# Patient Record
Sex: MALE | Race: WHITE | Employment: OTHER | URBAN - NONMETROPOLITAN AREA
[De-identification: names, ages, dates, MRNs, and addresses within clinical notes are randomized per-mention and may not be internally consistent; named-entity substitution may affect disease eponyms.]

---

## 2017-01-05 ENCOUNTER — HOSPITAL ENCOUNTER (OUTPATIENT)
Dept: WOUND CARE | Age: 82
Discharge: HOME | End: 2017-01-05
Payer: MEDICARE

## 2017-01-05 VITALS
WEIGHT: 214 LBS | DIASTOLIC BLOOD PRESSURE: 83 MMHG | TEMPERATURE: 97.5 F | SYSTOLIC BLOOD PRESSURE: 136 MMHG | HEIGHT: 72 IN | HEART RATE: 98 BPM | BODY MASS INDEX: 28.99 KG/M2 | RESPIRATION RATE: 18 BRPM

## 2017-01-05 DIAGNOSIS — L97.222 NON-PRESSURE CHRONIC ULCER OF LEFT CALF WITH FAT LAYER EXPOSED (HCC): ICD-10-CM

## 2017-01-05 DIAGNOSIS — I87.312 CHRONIC VENOUS HYPERTENSION (IDIOPATHIC) WITH ULCER OF LEFT LOWER EXTREMITY (HCC): ICD-10-CM

## 2017-01-05 DIAGNOSIS — L97.929 CHRONIC VENOUS HYPERTENSION (IDIOPATHIC) WITH ULCER OF LEFT LOWER EXTREMITY (HCC): ICD-10-CM

## 2017-01-05 PROCEDURE — 97597 DBRDMT OPN WND 1ST 20 CM/<: CPT

## 2017-01-05 PROCEDURE — 97597 DBRDMT OPN WND 1ST 20 CM/<: CPT | Performed by: SURGERY

## 2017-01-05 ASSESSMENT — PAIN SCALES - GENERAL: PAINLEVEL_OUTOF10: 0

## 2017-01-12 ENCOUNTER — HOSPITAL ENCOUNTER (OUTPATIENT)
Dept: WOUND CARE | Age: 82
Discharge: HOME | End: 2017-01-12
Payer: MEDICARE

## 2017-01-12 VITALS
RESPIRATION RATE: 18 BRPM | BODY MASS INDEX: 28.99 KG/M2 | DIASTOLIC BLOOD PRESSURE: 85 MMHG | SYSTOLIC BLOOD PRESSURE: 142 MMHG | HEART RATE: 105 BPM | HEIGHT: 72 IN | TEMPERATURE: 98 F | WEIGHT: 214 LBS

## 2017-01-12 DIAGNOSIS — L97.929 CHRONIC VENOUS HYPERTENSION (IDIOPATHIC) WITH ULCER OF LEFT LOWER EXTREMITY (HCC): ICD-10-CM

## 2017-01-12 DIAGNOSIS — I87.312 CHRONIC VENOUS HYPERTENSION (IDIOPATHIC) WITH ULCER OF LEFT LOWER EXTREMITY (HCC): ICD-10-CM

## 2017-01-12 DIAGNOSIS — L97.222 NON-PRESSURE CHRONIC ULCER OF LEFT CALF WITH FAT LAYER EXPOSED (HCC): ICD-10-CM

## 2017-01-12 PROCEDURE — 97597 DBRDMT OPN WND 1ST 20 CM/<: CPT | Performed by: SURGERY

## 2017-01-12 PROCEDURE — 97597 DBRDMT OPN WND 1ST 20 CM/<: CPT

## 2017-01-12 ASSESSMENT — PAIN SCALES - GENERAL: PAINLEVEL_OUTOF10: 0

## 2017-01-12 ASSESSMENT — PAIN DESCRIPTION - PROGRESSION: CLINICAL_PROGRESSION: NOT CHANGED

## 2017-01-12 ASSESSMENT — PAIN DESCRIPTION - ONSET: ONSET: ON-GOING

## 2017-01-12 ASSESSMENT — PAIN DESCRIPTION - ORIENTATION: ORIENTATION: LEFT

## 2017-01-12 ASSESSMENT — PAIN DESCRIPTION - FREQUENCY: FREQUENCY: INTERMITTENT

## 2017-01-12 ASSESSMENT — PAIN DESCRIPTION - LOCATION: LOCATION: LEG

## 2017-01-12 ASSESSMENT — PAIN DESCRIPTION - DESCRIPTORS: DESCRIPTORS: SORE;TENDER

## 2017-01-12 ASSESSMENT — PAIN DESCRIPTION - PAIN TYPE: TYPE: ACUTE PAIN

## 2017-01-18 ENCOUNTER — HOSPITAL ENCOUNTER (OUTPATIENT)
Dept: WOUND CARE | Age: 82
Discharge: HOME | End: 2017-01-18
Payer: MEDICARE

## 2017-01-18 VITALS
RESPIRATION RATE: 16 BRPM | TEMPERATURE: 97 F | DIASTOLIC BLOOD PRESSURE: 86 MMHG | BODY MASS INDEX: 28.99 KG/M2 | HEART RATE: 73 BPM | HEIGHT: 72 IN | WEIGHT: 214 LBS | SYSTOLIC BLOOD PRESSURE: 139 MMHG

## 2017-01-18 DIAGNOSIS — L97.929 CHRONIC VENOUS HYPERTENSION (IDIOPATHIC) WITH ULCER OF LEFT LOWER EXTREMITY (HCC): ICD-10-CM

## 2017-01-18 DIAGNOSIS — L97.222 NON-PRESSURE CHRONIC ULCER OF LEFT CALF WITH FAT LAYER EXPOSED (HCC): ICD-10-CM

## 2017-01-18 DIAGNOSIS — I87.312 CHRONIC VENOUS HYPERTENSION (IDIOPATHIC) WITH ULCER OF LEFT LOWER EXTREMITY (HCC): ICD-10-CM

## 2017-01-18 PROCEDURE — 97597 DBRDMT OPN WND 1ST 20 CM/<: CPT

## 2017-01-18 PROCEDURE — 97597 DBRDMT OPN WND 1ST 20 CM/<: CPT | Performed by: SURGERY

## 2017-01-25 ENCOUNTER — HOSPITAL ENCOUNTER (OUTPATIENT)
Dept: WOUND CARE | Age: 82
Discharge: HOME | End: 2017-01-25
Payer: MEDICARE

## 2017-01-25 VITALS
RESPIRATION RATE: 16 BRPM | WEIGHT: 214 LBS | HEIGHT: 72 IN | SYSTOLIC BLOOD PRESSURE: 152 MMHG | HEART RATE: 99 BPM | TEMPERATURE: 97.4 F | BODY MASS INDEX: 28.99 KG/M2 | DIASTOLIC BLOOD PRESSURE: 87 MMHG

## 2017-01-25 DIAGNOSIS — I87.312 CHRONIC VENOUS HYPERTENSION (IDIOPATHIC) WITH ULCER OF LEFT LOWER EXTREMITY (HCC): ICD-10-CM

## 2017-01-25 DIAGNOSIS — L97.222 NON-PRESSURE CHRONIC ULCER OF LEFT CALF WITH FAT LAYER EXPOSED (HCC): ICD-10-CM

## 2017-01-25 DIAGNOSIS — L97.929 CHRONIC VENOUS HYPERTENSION (IDIOPATHIC) WITH ULCER OF LEFT LOWER EXTREMITY (HCC): ICD-10-CM

## 2017-01-25 PROCEDURE — 97597 DBRDMT OPN WND 1ST 20 CM/<: CPT | Performed by: SURGERY

## 2017-01-25 PROCEDURE — 97597 DBRDMT OPN WND 1ST 20 CM/<: CPT

## 2017-01-25 ASSESSMENT — PAIN SCALES - GENERAL: PAINLEVEL_OUTOF10: 0

## 2017-02-01 ENCOUNTER — HOSPITAL ENCOUNTER (OUTPATIENT)
Dept: WOUND CARE | Age: 82
Discharge: HOME | End: 2017-02-01
Payer: MEDICARE

## 2017-02-01 VITALS
TEMPERATURE: 97.2 F | BODY MASS INDEX: 28.99 KG/M2 | DIASTOLIC BLOOD PRESSURE: 72 MMHG | WEIGHT: 214 LBS | SYSTOLIC BLOOD PRESSURE: 119 MMHG | HEIGHT: 72 IN | HEART RATE: 84 BPM | RESPIRATION RATE: 18 BRPM

## 2017-02-01 DIAGNOSIS — L97.929 CHRONIC VENOUS HYPERTENSION (IDIOPATHIC) WITH ULCER OF LEFT LOWER EXTREMITY (HCC): ICD-10-CM

## 2017-02-01 DIAGNOSIS — L97.222 NON-PRESSURE CHRONIC ULCER OF LEFT CALF WITH FAT LAYER EXPOSED (HCC): ICD-10-CM

## 2017-02-01 DIAGNOSIS — I87.312 CHRONIC VENOUS HYPERTENSION (IDIOPATHIC) WITH ULCER OF LEFT LOWER EXTREMITY (HCC): ICD-10-CM

## 2017-02-01 PROCEDURE — 97597 DBRDMT OPN WND 1ST 20 CM/<: CPT

## 2017-02-01 PROCEDURE — 97597 DBRDMT OPN WND 1ST 20 CM/<: CPT | Performed by: SURGERY

## 2017-02-01 ASSESSMENT — PAIN SCALES - GENERAL: PAINLEVEL_OUTOF10: 0

## 2017-02-06 ENCOUNTER — HOSPITAL ENCOUNTER (OUTPATIENT)
Dept: WOUND CARE | Age: 82
Discharge: HOME | End: 2017-02-06
Payer: MEDICARE

## 2017-02-06 VITALS
BODY MASS INDEX: 28.99 KG/M2 | SYSTOLIC BLOOD PRESSURE: 114 MMHG | RESPIRATION RATE: 18 BRPM | TEMPERATURE: 97.8 F | DIASTOLIC BLOOD PRESSURE: 72 MMHG | HEART RATE: 78 BPM | WEIGHT: 214 LBS | HEIGHT: 72 IN

## 2017-02-06 DIAGNOSIS — L97.222 NON-PRESSURE CHRONIC ULCER OF LEFT CALF WITH FAT LAYER EXPOSED (HCC): ICD-10-CM

## 2017-02-06 DIAGNOSIS — I87.312 CHRONIC VENOUS HYPERTENSION (IDIOPATHIC) WITH ULCER OF LEFT LOWER EXTREMITY (HCC): ICD-10-CM

## 2017-02-06 DIAGNOSIS — L97.929 CHRONIC VENOUS HYPERTENSION (IDIOPATHIC) WITH ULCER OF LEFT LOWER EXTREMITY (HCC): ICD-10-CM

## 2017-02-06 PROCEDURE — 97597 DBRDMT OPN WND 1ST 20 CM/<: CPT | Performed by: SURGERY

## 2017-02-06 PROCEDURE — 97597 DBRDMT OPN WND 1ST 20 CM/<: CPT

## 2017-02-06 ASSESSMENT — PAIN SCALES - GENERAL: PAINLEVEL_OUTOF10: 0

## 2017-02-13 ENCOUNTER — HOSPITAL ENCOUNTER (OUTPATIENT)
Dept: WOUND CARE | Age: 82
Discharge: HOME | End: 2017-02-13
Payer: MEDICARE

## 2017-02-13 VITALS
TEMPERATURE: 98.2 F | WEIGHT: 214 LBS | BODY MASS INDEX: 28.99 KG/M2 | SYSTOLIC BLOOD PRESSURE: 130 MMHG | HEART RATE: 73 BPM | DIASTOLIC BLOOD PRESSURE: 78 MMHG | HEIGHT: 72 IN | RESPIRATION RATE: 18 BRPM

## 2017-02-13 DIAGNOSIS — I87.312 CHRONIC VENOUS HYPERTENSION (IDIOPATHIC) WITH ULCER OF LEFT LOWER EXTREMITY (HCC): ICD-10-CM

## 2017-02-13 DIAGNOSIS — L97.222 NON-PRESSURE CHRONIC ULCER OF LEFT CALF WITH FAT LAYER EXPOSED (HCC): ICD-10-CM

## 2017-02-13 DIAGNOSIS — L97.929 CHRONIC VENOUS HYPERTENSION (IDIOPATHIC) WITH ULCER OF LEFT LOWER EXTREMITY (HCC): ICD-10-CM

## 2017-02-13 PROCEDURE — 97597 DBRDMT OPN WND 1ST 20 CM/<: CPT | Performed by: SURGERY

## 2017-02-13 PROCEDURE — 97597 DBRDMT OPN WND 1ST 20 CM/<: CPT

## 2017-02-13 ASSESSMENT — PAIN DESCRIPTION - FREQUENCY: FREQUENCY: INTERMITTENT

## 2017-02-13 ASSESSMENT — PAIN DESCRIPTION - PAIN TYPE: TYPE: ACUTE PAIN

## 2017-02-13 ASSESSMENT — PAIN DESCRIPTION - DESCRIPTORS: DESCRIPTORS: SORE;TENDER

## 2017-02-13 ASSESSMENT — PAIN DESCRIPTION - ORIENTATION: ORIENTATION: LEFT

## 2017-02-13 ASSESSMENT — PAIN DESCRIPTION - LOCATION: LOCATION: LEG

## 2017-02-13 ASSESSMENT — PAIN SCALES - GENERAL: PAINLEVEL_OUTOF10: 0

## 2017-02-13 ASSESSMENT — PAIN DESCRIPTION - PROGRESSION: CLINICAL_PROGRESSION: NOT CHANGED

## 2017-02-13 ASSESSMENT — PAIN DESCRIPTION - ONSET: ONSET: ON-GOING

## 2017-02-21 ENCOUNTER — HOSPITAL ENCOUNTER (OUTPATIENT)
Dept: WOUND CARE | Age: 82
Discharge: HOME | End: 2017-02-21
Payer: MEDICARE

## 2017-02-21 VITALS
SYSTOLIC BLOOD PRESSURE: 129 MMHG | WEIGHT: 212 LBS | HEART RATE: 82 BPM | HEIGHT: 72 IN | BODY MASS INDEX: 28.71 KG/M2 | DIASTOLIC BLOOD PRESSURE: 75 MMHG | RESPIRATION RATE: 24 BRPM | TEMPERATURE: 97.8 F

## 2017-02-21 DIAGNOSIS — L97.929 CHRONIC VENOUS HYPERTENSION (IDIOPATHIC) WITH ULCER OF LEFT LOWER EXTREMITY (HCC): ICD-10-CM

## 2017-02-21 DIAGNOSIS — L97.222 NON-PRESSURE CHRONIC ULCER OF LEFT CALF WITH FAT LAYER EXPOSED (HCC): ICD-10-CM

## 2017-02-21 DIAGNOSIS — I87.312 CHRONIC VENOUS HYPERTENSION (IDIOPATHIC) WITH ULCER OF LEFT LOWER EXTREMITY (HCC): ICD-10-CM

## 2017-02-21 PROCEDURE — 97597 DBRDMT OPN WND 1ST 20 CM/<: CPT

## 2017-02-21 PROCEDURE — 97597 DBRDMT OPN WND 1ST 20 CM/<: CPT | Performed by: SURGERY

## 2017-02-21 ASSESSMENT — PAIN DESCRIPTION - LOCATION: LOCATION: LEG

## 2017-02-21 ASSESSMENT — PAIN DESCRIPTION - ORIENTATION: ORIENTATION: LEFT

## 2017-02-21 ASSESSMENT — PAIN DESCRIPTION - DESCRIPTORS: DESCRIPTORS: SORE

## 2017-02-21 ASSESSMENT — PAIN DESCRIPTION - PAIN TYPE: TYPE: ACUTE PAIN

## 2017-02-21 ASSESSMENT — PAIN SCALES - GENERAL: PAINLEVEL_OUTOF10: 2

## 2017-02-21 ASSESSMENT — PAIN DESCRIPTION - FREQUENCY: FREQUENCY: INTERMITTENT

## 2017-02-28 ENCOUNTER — HOSPITAL ENCOUNTER (OUTPATIENT)
Dept: WOUND CARE | Age: 82
Discharge: HOME | End: 2017-02-28
Payer: MEDICARE

## 2017-02-28 VITALS
WEIGHT: 212 LBS | BODY MASS INDEX: 28.71 KG/M2 | HEIGHT: 72 IN | SYSTOLIC BLOOD PRESSURE: 156 MMHG | HEART RATE: 74 BPM | DIASTOLIC BLOOD PRESSURE: 90 MMHG | TEMPERATURE: 98 F | RESPIRATION RATE: 16 BRPM

## 2017-02-28 DIAGNOSIS — I87.312 CHRONIC VENOUS HYPERTENSION (IDIOPATHIC) WITH ULCER OF LEFT LOWER EXTREMITY (HCC): ICD-10-CM

## 2017-02-28 DIAGNOSIS — L97.929 CHRONIC VENOUS HYPERTENSION (IDIOPATHIC) WITH ULCER OF LEFT LOWER EXTREMITY (HCC): ICD-10-CM

## 2017-02-28 DIAGNOSIS — L97.222 NON-PRESSURE CHRONIC ULCER OF LEFT CALF WITH FAT LAYER EXPOSED (HCC): ICD-10-CM

## 2017-02-28 PROCEDURE — 15271 SKIN SUB GRAFT TRNK/ARM/LEG: CPT

## 2017-02-28 PROCEDURE — 15271 SKIN SUB GRAFT TRNK/ARM/LEG: CPT | Performed by: SURGERY

## 2017-02-28 PROCEDURE — C5271 LOW COST SKIN SUBSTITUTE APP: HCPCS

## 2017-02-28 ASSESSMENT — PAIN SCALES - GENERAL: PAINLEVEL_OUTOF10: 0

## 2017-03-07 ENCOUNTER — HOSPITAL ENCOUNTER (OUTPATIENT)
Dept: WOUND CARE | Age: 82
Discharge: HOME | End: 2017-03-07
Payer: MEDICARE

## 2017-03-07 VITALS
TEMPERATURE: 97.5 F | DIASTOLIC BLOOD PRESSURE: 79 MMHG | SYSTOLIC BLOOD PRESSURE: 134 MMHG | RESPIRATION RATE: 18 BRPM | HEART RATE: 75 BPM | HEIGHT: 72 IN | WEIGHT: 212 LBS | BODY MASS INDEX: 28.71 KG/M2

## 2017-03-07 DIAGNOSIS — L97.222 NON-PRESSURE CHRONIC ULCER OF LEFT CALF WITH FAT LAYER EXPOSED (HCC): ICD-10-CM

## 2017-03-07 DIAGNOSIS — I87.312 CHRONIC VENOUS HYPERTENSION (IDIOPATHIC) WITH ULCER OF LEFT LOWER EXTREMITY (HCC): ICD-10-CM

## 2017-03-07 DIAGNOSIS — L97.929 CHRONIC VENOUS HYPERTENSION (IDIOPATHIC) WITH ULCER OF LEFT LOWER EXTREMITY (HCC): ICD-10-CM

## 2017-03-07 PROCEDURE — 99213 OFFICE O/P EST LOW 20 MIN: CPT | Performed by: SURGERY

## 2017-03-07 PROCEDURE — 99212 OFFICE O/P EST SF 10 MIN: CPT

## 2017-03-07 ASSESSMENT — PAIN SCALES - GENERAL: PAINLEVEL_OUTOF10: 0

## 2017-03-14 ENCOUNTER — HOSPITAL ENCOUNTER (OUTPATIENT)
Dept: WOUND CARE | Age: 82
Discharge: HOME | End: 2017-03-14
Payer: MEDICARE

## 2017-03-14 VITALS
DIASTOLIC BLOOD PRESSURE: 77 MMHG | TEMPERATURE: 97.7 F | BODY MASS INDEX: 28.71 KG/M2 | HEIGHT: 72 IN | WEIGHT: 212 LBS | HEART RATE: 82 BPM | RESPIRATION RATE: 18 BRPM | SYSTOLIC BLOOD PRESSURE: 135 MMHG

## 2017-03-14 DIAGNOSIS — I87.312 CHRONIC VENOUS HYPERTENSION (IDIOPATHIC) WITH ULCER OF LEFT LOWER EXTREMITY (HCC): ICD-10-CM

## 2017-03-14 DIAGNOSIS — L97.929 CHRONIC VENOUS HYPERTENSION (IDIOPATHIC) WITH ULCER OF LEFT LOWER EXTREMITY (HCC): ICD-10-CM

## 2017-03-14 DIAGNOSIS — L97.222 NON-PRESSURE CHRONIC ULCER OF LEFT CALF WITH FAT LAYER EXPOSED (HCC): ICD-10-CM

## 2017-03-14 PROCEDURE — 99212 OFFICE O/P EST SF 10 MIN: CPT

## 2017-03-14 PROCEDURE — 99213 OFFICE O/P EST LOW 20 MIN: CPT | Performed by: SURGERY

## 2017-03-14 ASSESSMENT — PAIN SCALES - GENERAL: PAINLEVEL_OUTOF10: 0

## 2017-03-21 ENCOUNTER — HOSPITAL ENCOUNTER (OUTPATIENT)
Dept: WOUND CARE | Age: 82
Discharge: HOME | End: 2017-03-21
Payer: MEDICARE

## 2017-03-21 VITALS
HEIGHT: 72 IN | TEMPERATURE: 97.5 F | RESPIRATION RATE: 16 BRPM | SYSTOLIC BLOOD PRESSURE: 131 MMHG | BODY MASS INDEX: 28.71 KG/M2 | DIASTOLIC BLOOD PRESSURE: 88 MMHG | HEART RATE: 98 BPM | WEIGHT: 212 LBS

## 2017-03-21 DIAGNOSIS — L97.222 NON-PRESSURE CHRONIC ULCER OF LEFT CALF WITH FAT LAYER EXPOSED (HCC): ICD-10-CM

## 2017-03-21 DIAGNOSIS — L97.929 CHRONIC VENOUS HYPERTENSION (IDIOPATHIC) WITH ULCER OF LEFT LOWER EXTREMITY (HCC): ICD-10-CM

## 2017-03-21 DIAGNOSIS — I87.312 CHRONIC VENOUS HYPERTENSION (IDIOPATHIC) WITH ULCER OF LEFT LOWER EXTREMITY (HCC): ICD-10-CM

## 2017-03-21 PROCEDURE — 99212 OFFICE O/P EST SF 10 MIN: CPT

## 2017-03-21 PROCEDURE — 99213 OFFICE O/P EST LOW 20 MIN: CPT | Performed by: SURGERY

## 2017-03-21 ASSESSMENT — PAIN SCALES - GENERAL: PAINLEVEL_OUTOF10: 0

## 2017-03-28 ENCOUNTER — HOSPITAL ENCOUNTER (OUTPATIENT)
Dept: WOUND CARE | Age: 82
Discharge: HOME | End: 2017-03-28
Payer: MEDICARE

## 2017-03-28 VITALS
SYSTOLIC BLOOD PRESSURE: 143 MMHG | RESPIRATION RATE: 16 BRPM | WEIGHT: 212 LBS | BODY MASS INDEX: 28.71 KG/M2 | DIASTOLIC BLOOD PRESSURE: 87 MMHG | HEART RATE: 93 BPM | HEIGHT: 72 IN | TEMPERATURE: 97 F

## 2017-03-28 DIAGNOSIS — L97.929 CHRONIC VENOUS HYPERTENSION (IDIOPATHIC) WITH ULCER OF LEFT LOWER EXTREMITY (HCC): ICD-10-CM

## 2017-03-28 DIAGNOSIS — I87.312 CHRONIC VENOUS HYPERTENSION (IDIOPATHIC) WITH ULCER OF LEFT LOWER EXTREMITY (HCC): ICD-10-CM

## 2017-03-28 DIAGNOSIS — L97.222 NON-PRESSURE CHRONIC ULCER OF LEFT CALF WITH FAT LAYER EXPOSED (HCC): ICD-10-CM

## 2017-03-28 PROCEDURE — 99212 OFFICE O/P EST SF 10 MIN: CPT

## 2017-03-28 PROCEDURE — 99213 OFFICE O/P EST LOW 20 MIN: CPT | Performed by: SURGERY

## 2017-03-28 ASSESSMENT — PAIN SCALES - GENERAL: PAINLEVEL_OUTOF10: 0

## 2017-07-25 ENCOUNTER — TRANSCRIBE ORDERS (OUTPATIENT)
Dept: ADMINISTRATIVE | Facility: HOSPITAL | Age: 82
End: 2017-07-25

## 2017-07-25 ENCOUNTER — TELEPHONE (OUTPATIENT)
Dept: VASCULAR SURGERY | Facility: CLINIC | Age: 82
End: 2017-07-25

## 2017-07-25 ENCOUNTER — HOSPITAL ENCOUNTER (OUTPATIENT)
Dept: ULTRASOUND IMAGING | Facility: HOSPITAL | Age: 82
Discharge: HOME | End: 2017-07-25
Admitting: NURSE PRACTITIONER

## 2017-07-25 ENCOUNTER — TRANSCRIBE ORDERS (OUTPATIENT)
Dept: LAB | Facility: HOSPITAL | Age: 82
End: 2017-07-25

## 2017-07-25 DIAGNOSIS — I87.2 CHRONIC VENOUS INSUFFICIENCY: ICD-10-CM

## 2017-07-25 DIAGNOSIS — I82.401 ACUTE EMBOLISM AND THROMBOSIS OF DEEP VEIN OF RIGHT LOWER EXTREMITY (HCC): ICD-10-CM

## 2017-07-25 DIAGNOSIS — I82.401 ACUTE EMBOLISM AND THROMBOSIS OF DEEP VEIN OF RIGHT LOWER EXTREMITY (HCC): Primary | ICD-10-CM

## 2017-07-25 DIAGNOSIS — I87.2 UNSPECIFIED VENOUS (PERIPHERAL) INSUFFICIENCY: Primary | ICD-10-CM

## 2017-07-25 PROCEDURE — 93971 EXTREMITY STUDY: CPT

## 2017-07-25 NOTE — TELEPHONE ENCOUNTER
SPOKE WITH PATIENT EMERGENCY CONTACT DUE TO BEING UNABLE TO REACH PATIENT.  ADVISED THAT PATIENT HAS A TEST AT THE [facility] AND AN APPOINTMENT FOLLOWING.  PATIENT EMERGENCY CONTACT EXPRESSED UNDERSTANDING.

## 2017-07-26 ENCOUNTER — OFFICE VISIT (OUTPATIENT)
Dept: VASCULAR SURGERY | Facility: CLINIC | Age: 82
End: 2017-07-26

## 2017-07-26 ENCOUNTER — HOSPITAL ENCOUNTER (OUTPATIENT)
Dept: ULTRASOUND IMAGING | Facility: HOSPITAL | Age: 82
Discharge: HOME | End: 2017-07-26
Admitting: NURSE PRACTITIONER

## 2017-07-26 VITALS
HEART RATE: 82 BPM | WEIGHT: 215 LBS | SYSTOLIC BLOOD PRESSURE: 114 MMHG | BODY MASS INDEX: 29.12 KG/M2 | DIASTOLIC BLOOD PRESSURE: 78 MMHG | HEIGHT: 72 IN

## 2017-07-26 DIAGNOSIS — I87.2 STASIS DERMATITIS OF BOTH LEGS: ICD-10-CM

## 2017-07-26 DIAGNOSIS — M79.89 LEG SWELLING: ICD-10-CM

## 2017-07-26 DIAGNOSIS — I87.2 CHRONIC VENOUS INSUFFICIENCY: ICD-10-CM

## 2017-07-26 DIAGNOSIS — I73.9 PAD (PERIPHERAL ARTERY DISEASE) (HCC): Primary | ICD-10-CM

## 2017-07-26 DIAGNOSIS — I10 ESSENTIAL HYPERTENSION: ICD-10-CM

## 2017-07-26 PROBLEM — I83.893 VARICOSE VEINS OF BOTH LEGS WITH EDEMA: Status: ACTIVE | Noted: 2017-07-26

## 2017-07-26 PROCEDURE — 99203 OFFICE O/P NEW LOW 30 MIN: CPT | Performed by: NURSE PRACTITIONER

## 2017-07-26 PROCEDURE — 93924 LWR XTR VASC STDY BILAT: CPT | Performed by: SURGERY

## 2017-07-26 PROCEDURE — 93923 UPR/LXTR ART STDY 3+ LVLS: CPT

## 2017-07-26 RX ORDER — HYDROCORTISONE ACETATE 0.5 %
1500 CREAM (GRAM) TOPICAL 2 TIMES DAILY
COMMUNITY
End: 2018-07-26

## 2017-07-26 RX ORDER — PREDNISONE 1 MG/1
TABLET ORAL
COMMUNITY
Start: 2017-05-23 | End: 2017-07-26

## 2017-07-26 RX ORDER — CHLORAL HYDRATE 500 MG
1000 CAPSULE ORAL NIGHTLY
COMMUNITY

## 2017-07-26 RX ORDER — ASPIRIN 81 MG/1
81 TABLET ORAL DAILY
Start: 2017-07-26 | End: 2017-09-14

## 2017-07-26 RX ORDER — LISINOPRIL AND HYDROCHLOROTHIAZIDE 20; 12.5 MG/1; MG/1
TABLET ORAL
Refills: 3 | COMMUNITY
Start: 2017-06-12 | End: 2018-07-26

## 2017-07-26 RX ORDER — GATIFLOXACIN 5 MG/ML
SOLUTION/ DROPS OPHTHALMIC
Refills: 1 | COMMUNITY
Start: 2017-06-03 | End: 2017-07-26

## 2017-07-26 RX ORDER — LANOLIN ALCOHOL/MO/W.PET/CERES
1000 CREAM (GRAM) TOPICAL DAILY
COMMUNITY

## 2017-07-26 NOTE — PROGRESS NOTES
"07/26/2017      Gene Hernandez DPM  [id number]  [place] [address]    Sangita Viramontes  1/5/1933    Chief Complaint   Patient presents with   • Lower Extremity Issue     below knee area fluid / light pain when walking        Dear Gene Hernandez DPM:      HPI  I had the pleasure of seeing your patient Sangita Viramontes in the office today.  Thank you kindly for this consultation.  As you recall, Sangita Viramontes is a 84 y.o.  male who you have recently seen.  He states he was seen for \"fluid buildup in his legs\".  He seems to be referring to varicose veins to his right lower extremity.  He has evidence of stasis dermatitis and swelling to bilateral lower extremities.  He denies any cramping or aching of his legs.  He denies any claudicating symptoms to his lower extremities.  He was sent to our office for abnormal pulses and sent for arterial testing.  He did have noninvasive testing performed today, which I did review in office.    Past Medical History:   Diagnosis Date   • Hypertension        Past Surgical History:   Procedure Laterality Date   • CATARACT EXTRACTION Bilateral    • CHOLECYSTECTOMY     • LEG SURGERY     • TOTAL KNEE ARTHROPLASTY Left    • TOTAL KNEE ARTHROPLASTY Right        History reviewed. No pertinent family history.    Social History     Social History   • Marital status:      Spouse name: N/A   • Number of children: N/A   • Years of education: N/A     Occupational History   • Not on file.     Social History Main Topics   • Smoking status: Former Smoker   • Smokeless tobacco: Former User     Types: Chew   • Alcohol use No   • Drug use: No   • Sexual activity: Not on file     Other Topics Concern   • Not on file     Social History Narrative   • No narrative on file       Allergies   Allergen Reactions   • Other Rash     PT STATES PLASTIC GLOVES BROKE HIS HANDS OUT, HE DENIES REACTION TO TAPE OR BANDAIDS       Prior to Admission medications    Medication Sig Start Date End " "Date Taking? Authorizing Provider   Glucosamine-Chondroit-Vit C-Mn (GLUCOSAMINE 1500 COMPLEX) capsule Take 1,500 mg by mouth 2 (Two) Times a Day.   Yes Historical Provider, MD   lisinopril-hydrochlorothiazide (PRINZIDE,ZESTORETIC) 20-12.5 MG per tablet  6/12/17  Yes Historical Provider, MD   Multiple Vitamins-Minerals (VISION-JUVENTINO PRESERVE PO) Take  by mouth.   Yes Historical Provider, MD   Omega-3 Fatty Acids (FISH OIL) 1000 MG capsule capsule Take 1,000 mg by mouth Daily With Breakfast.   Yes Historical Provider, MD   vitamin B-12 (CYANOCOBALAMIN) 100 MCG tablet Take 50 mcg by mouth Daily.   Yes Historical Provider, MD   gatifloxacin (ZYMAXID) 0.5 % solution ophthalmic solution  6/3/17 7/26/17  Historical Provider, MD   predniSONE (DELTASONE) 5 MG tablet  5/23/17 7/26/17  Historical Provider, MD       Review of Systems   Constitutional: Negative.    HENT: Negative.    Eyes: Negative.    Respiratory: Negative.    Cardiovascular: Positive for leg swelling (varicose veins to bialteral lower extremities, right greater than left).   Gastrointestinal: Negative.    Endocrine: Negative.    Genitourinary: Negative.    Musculoskeletal: Negative.    Skin: Positive for color change.   Allergic/Immunologic: Negative.    Neurological: Negative.    Hematological: Negative.    Psychiatric/Behavioral: Negative.    All other systems reviewed and are negative.      /78 (BP Location: Left arm, Patient Position: Sitting, Cuff Size: Adult)  Pulse 82  Ht 72\" (182.9 cm)  Wt 215 lb (97.5 kg)  BMI 29.16 kg/m2  Physical Exam   Constitutional: He is oriented to person, place, and time. He appears well-developed and well-nourished.   HENT:   Head: Normocephalic and atraumatic.   Eyes: Pupils are equal, round, and reactive to light. No scleral icterus.   Neck: Normal range of motion. Neck supple. No thyromegaly present.   Cardiovascular: Normal rate, regular rhythm and normal heart sounds.    Doppler signals  Varicose veins to " bilateral lower extremities, right greater than left with a large varicosity to calf  Stasis dermatitis to bilateral lower extremtiies   Pulmonary/Chest: Effort normal and breath sounds normal.   Abdominal: Soft. Bowel sounds are normal.   Musculoskeletal: Normal range of motion.   Neurological: He is alert and oriented to person, place, and time.   Skin: Skin is warm and dry.   Psychiatric: He has a normal mood and affect. His behavior is normal. Judgment and thought content normal.   Nursing note and vitals reviewed.      Us Ankle / Brachial Indices Extremity Complete    Result Date: 7/26/2017  Narrative:  History: PAD  Comments: Bilateral lower extremity arterial with multi-level pulse volume recordings and segmental pressures were performed at rest and stress.  The right ankle/brachial index is 0.75. The waveforms are biphasic and dampened at the ankle.These findings are consistent with moderate arterial insufficiency of the right lower extremity at rest.  The postexercise ABIs 0.76.  The left ankle/brachial index is 1.11. The waveforms are biphasic without dampening. These findings are consistent with no significant arterial insufficiency of the left lower extremity at rest.    The postexercise ABIs 1.16.      Impression: Impression: 1. Moderate arterial insufficiency of the right lower extremity at rest. 2. No significant arterial insufficiency of the left lower extremity at rest.   This report was finalized on 07/26/2017 13:54 by Dr. Hosea Orr MD.    Us Venous Doppler Lower Extremity Right (duplex)    Result Date: 7/25/2017  Narrative: US VENOUS DOPPLER LOWER EXTREMITY RIGHT (DUPLEX)- 7/25/2017 4:03 PM CDT  HISTORY:  i82.401; I82.401-Acute embolism and thrombosis of unspecified deep veins of right lower extremity  COMPARISON: NONE  FINDINGS: Transverse and longitudinal grayscale and Doppler sonographic images of the right lower extremity were obtained. This includes augmentation and compression  imaging. The study was performed with B-mode/grayscale imaging with spectral analysis and color flow imaging.  There is normal flow and compressibility of the right common femoral, superficial femoral, popliteal, peroneal, anterior tibial, and posterior tibial veins.      Impression: 1. Normal duplex ultrasound of the right lower extremity without evidence of DVT.  This report was finalized on 07/25/2017 16:58 by Dr. Quincy Roque MD.      Patient Active Problem List   Diagnosis   • Hypertension   • Varicose veins of both legs with edema         ICD-10-CM ICD-9-CM   1. PAD (peripheral artery disease) I73.9 443.9   2. Stasis dermatitis of both legs I83.11 454.1    I83.12    3. Essential hypertension I10 401.9   4. Leg swelling M79.89 729.81       Plan: After thoroughly evaluating Sangita Viramontes, I believe the best course of action is to remain conservative from a vascular standpoint.  He denies any claudicating symptoms.  He does have some leg swelling and I will give him a prescription for compression stockings in the 20-30 mm pressure gradient range.  He does have evidence of moderate arterial disease on the right.  I did recommend he begin taking an aspirin EC 81 mg.  We will see him back in 1 years with repeat noninvasive testing for continued surveillance.   The patient can continue taking her current medication regimen as previously planned.  This was all discussed in full with complete understanding.    Thank you for allowing me to participate in the care of your patient.  Please do not hesitate with any questions or concerns.  I will keep you aware of any further encounters with Sangita Viramontes.        Sincerely yours,         CRYSTAL Lynn MD

## 2017-09-14 ENCOUNTER — OFFICE VISIT (OUTPATIENT)
Dept: CARDIOLOGY | Facility: CLINIC | Age: 82
End: 2017-09-14

## 2017-09-14 VITALS
HEIGHT: 72 IN | DIASTOLIC BLOOD PRESSURE: 80 MMHG | SYSTOLIC BLOOD PRESSURE: 106 MMHG | BODY MASS INDEX: 27.87 KG/M2 | WEIGHT: 205.8 LBS | HEART RATE: 81 BPM

## 2017-09-14 DIAGNOSIS — I73.9 PAD (PERIPHERAL ARTERY DISEASE) (HCC): ICD-10-CM

## 2017-09-14 DIAGNOSIS — I10 ESSENTIAL HYPERTENSION: ICD-10-CM

## 2017-09-14 DIAGNOSIS — I48.0 PAROXYSMAL ATRIAL FIBRILLATION (HCC): Primary | ICD-10-CM

## 2017-09-14 PROCEDURE — 99205 OFFICE O/P NEW HI 60 MIN: CPT | Performed by: INTERNAL MEDICINE

## 2017-09-14 PROCEDURE — 93000 ELECTROCARDIOGRAM COMPLETE: CPT | Performed by: INTERNAL MEDICINE

## 2017-09-14 RX ORDER — ATENOLOL 100 MG/1
TABLET ORAL
COMMUNITY
Start: 2017-09-07 | End: 2018-07-26

## 2017-09-14 NOTE — PROGRESS NOTES
[facility] - CARDIOLOGY  New Patient Initial Outpatient Evaulation    Primary Care Physician: Floyd Peres MD    Subjective     Chief Complaint: New diagnosis of atrial fibrillation    Mr. Viramontes is a pleasant 84-year-old male kindly referred to me by Dr. Lonny Peres after a new diagnosis of atrial fibrillation was made during his clinic visit with the patient last week.  Patient had no symptoms, but baseline electrocardiogram there revealed atrial fibrillation.  Dr. Peres then ordered a Holter monitor, with my interpretation of those results listed below.  The patient was started on Pradaxa and comes today to establish care.  As detailed below, he is symptomatically unaware that he has atrial fibrillation.  Atrial Fibrillation   Presents for initial visit. Onset time: unknown; initially diagnosed 9/6/17 by Dr. Peres. Symptoms include chest pain. Symptoms are negative for dizziness, palpitations, shortness of breath, syncope and weakness. The symptoms have been stable. Past treatments include anticoagulant. Compliance with prior treatments has been good. Past medical history includes atrial fibrillation and HTN. There is no history of atrial flutter, AICD, CABG/stent, CAD, CHF, DVT and valvular heart disease.   Hypertension   This is a chronic problem. The problem is controlled. Associated symptoms include chest pain. Pertinent negatives include no malaise/fatigue, orthopnea, palpitations, PND or shortness of breath. Past treatments include ACE inhibitors and diuretics. The current treatment provides significant improvement.     Review of Systems   Constitution: Negative. Negative for weakness and malaise/fatigue.   HENT: Negative.  Negative for nosebleeds.    Eyes: Negative.    Cardiovascular: Positive for chest pain. Negative for claudication, dyspnea on exertion, irregular heartbeat, leg swelling, near-syncope, orthopnea, palpitations, paroxysmal nocturnal dyspnea and syncope.   Respiratory: Negative for  cough, shortness of breath and wheezing.    Endocrine: Negative.    Hematologic/Lymphatic: Negative for bleeding problem. Bruises/bleeds easily.   Skin: Negative.    Musculoskeletal: Negative.    Gastrointestinal: Negative for dysphagia, heartburn, hematemesis, hematochezia and melena.   Genitourinary: Negative.  Negative for hematuria and non-menstrual bleeding.   Neurological: Negative.  Negative for dizziness, light-headedness and loss of balance.   Psychiatric/Behavioral: Negative.    Allergic/Immunologic: Negative.         Otherwise complete ROS reviewed and negative except as mentioned in the HPI.      Past Medical History:   Past Medical History:   Diagnosis Date   • Hypertension    • Sleep apnea        Past Surgical History:  Past Surgical History:   Procedure Laterality Date   • CATARACT EXTRACTION Bilateral    • CHOLECYSTECTOMY     • LEG SURGERY     • TOTAL KNEE ARTHROPLASTY Left    • TOTAL KNEE ARTHROPLASTY Right        Family History: family history includes Heart attack in his mother; Heart failure in his father.    Social History:  reports that he has quit smoking. He has quit using smokeless tobacco. His smokeless tobacco use included Chew. He reports that he does not drink alcohol or use illicit drugs.    Medications:  Prior to Admission medications    Medication Sig Start Date End Date Taking? Authorizing Provider   aspirin 81 MG EC tablet Take 1 tablet by mouth Daily. 7/26/17 7/26/18 Yes CRYSTAL Lynn   Glucosamine-Chondroit-Vit C-Mn (GLUCOSAMINE 1500 COMPLEX) capsule Take 1,500 mg by mouth 2 (Two) Times a Day.   Yes Historical Provider, MD   lisinopril-hydrochlorothiazide (PRINZIDE,ZESTORETIC) 20-12.5 MG per tablet  6/12/17  Yes Historical Provider, MD   Multiple Vitamins-Minerals (VISION-JUVENTINO PRESERVE PO) Take  by mouth.   Yes Historical Provider, MD   Omega-3 Fatty Acids (FISH OIL) 1000 MG capsule capsule Take 1,000 mg by mouth Daily With Breakfast.   Yes Historical Provider, MD  "  PRADAXA 150 MG capsu  9/7/17  Yes Historical Provider, MD   vitamin B-12 (CYANOCOBALAMIN) 100 MCG tablet Take 50 mcg by mouth Daily.   Yes Historical Provider, MD     Allergies:  Allergies   Allergen Reactions   • Other Rash     PT STATES PLASTIC GLOVES BROKE HIS HANDS OUT, HE DENIES REACTION TO TAPE OR BANDAIDS       Objective     Vital Signs: /80 (BP Location: Right arm, Patient Position: Sitting)  Pulse 81  Ht 72\" (182.9 cm)  Wt 205 lb 12.8 oz (93.4 kg)  BMI 27.91 kg/m2    Physical Exam   Constitutional: No distress.   HENT:   Mouth/Throat: Oropharynx is clear. Pharynx is normal.   Neck: Normal range of motion and thyroid normal. Neck supple. No JVD present. No thyromegaly present.   Cardiovascular: Normal rate, S1 normal, S2 normal, normal heart sounds, intact distal pulses and normal pulses.  An irregularly irregular rhythm present.  No extrasystoles are present. PMI is not displaced.  Exam reveals no gallop.    No murmur heard.  Pulmonary/Chest: Effort normal and breath sounds normal.   Abdominal: Soft. Bowel sounds are normal. He exhibits no distension. There is no splenomegaly or hepatomegaly. There is no tenderness.   Musculoskeletal: He exhibits no edema or tenderness.   Neurological: He is alert and oriented to person, place, and time.   Skin: Skin is warm and dry. Rash (dry, scaly skin on arms) noted.       Results Reviewed:      ECG 12 Lead  Date/Time: 9/14/2017 10:57 AM  Performed by: JAMIL COREAS  Authorized by: JAMIL COREAS   Comparison: compared with previous ECG from 9/16/2017  Similar to previous ECG  Rhythm: atrial fibrillation  BPM: 81  Clinical impression: abnormal ECG          I reviewed multiple old records sent from the primary provider, including Dr. Peres's last visit with the patient on 9/6/17.  Addendum to that note reports an EKG showing atrial fibrillation the day.  A Holter monitor was ordered.  I reviewed this electrocardiogram and agree with the interpretation " that did show atrial fibrillation, rate 83 BPM, with voltage criteria for LVH.  -I reviewed the Holter report there was a 24-hour Holter monitor worn on 9/6 and reported by Dr. Peres on 9/7.  The report states:   -3.9% of all beats to be supraventricular ectopies, with the longest run of supraventricular ectopic beats being 5 beats at a rate of 210 bpm.  There was no episodes of atrial fibrillation or pauses greater than 2.5 seconds.  2.63% of all beats were ventricular ectopies, with the longest run being a 6 beat run at 113 BPM.  The fastest run was 222 bpm lasted only 3 beats (triplet).  Minimum heart rate was 39 bpm at 2243 that evening.  Overall, heart rate less than 50 bpm was observed less than 1% of the time.  This was the interpretation given by the company;    -However, I personally reviewed the strips and noted atrial fibrillation during multiple other recordings that were stored for low heart rates  Assessment / Plan        Problem List Items Addressed This Visit        Cardiovascular and Mediastinum    Hypertension    Current Assessment & Plan     Hypertension is well controlled.  Continue current treatment regimen.  Blood pressure will be reassessed at the next regular appointment.         Paroxysmal atrial fibrillation - Primary    Current Assessment & Plan     Asymptomatic; continue Pradaxa 150mg BID.  He has had no symptomatic tachycardia arrhythmias (RVR), and he is intrinsically rate-controlled currently without any guillermo blockade.  I see no reason to start any other medication at this point, unless he develops symptoms or has documented RVR.  Will check echocardiogram.         Relevant Orders    ECG 12 Lead    Adult Transthoracic Echo Complete With Contrast    PAD (peripheral artery disease)    Current Assessment & Plan     Placed on ASA 81 7/26/17 for moderate PAD in RLE; now with need for anticoagulation (AFib), I think the combination placed this 85 yo at risk for bleeding that outweighs the  benefit so I advised him to stop taking aspirin.             F/u 1 year, or sooner if any new symptoms develop.  Will call w/echo results.    Eduin Steven MD   09/14/17   11:58 AM

## 2017-09-14 NOTE — ASSESSMENT & PLAN NOTE
Hypertension is well controlled.  Continue current treatment regimen.  Blood pressure will be reassessed at the next regular appointment.

## 2017-09-14 NOTE — ASSESSMENT & PLAN NOTE
Asymptomatic; continue Pradaxa 150mg BID.  He has had no symptomatic tachycardia arrhythmias (RVR), and he is intrinsically rate-controlled currently without any guillermo blockade.  I see no reason to start any other medication at this point, unless he develops symptoms or has documented RVR.  Will check echocardiogram.

## 2017-09-14 NOTE — ASSESSMENT & PLAN NOTE
Placed on ASA 81 7/26/17 for moderate PAD in RLE; now with need for anticoagulation (AFib), I think the combination placed this 83 yo at risk for bleeding that outweighs the benefit so I advised him to stop taking aspirin.

## 2017-09-14 NOTE — PATIENT INSTRUCTIONS
Atrial Fibrillation  Atrial fibrillation is a type of irregular or rapid heartbeat (arrhythmia). In atrial fibrillation, the heart quivers continuously in a chaotic pattern. This occurs when parts of the heart receive disorganized signals that make the heart unable to pump blood normally. This can increase the risk for stroke, heart failure, and other heart-related conditions. There are different types of atrial fibrillation, including:  · Paroxysmal atrial fibrillation. This type starts suddenly, and it usually stops on its own shortly after it starts.  · Persistent atrial fibrillation. This type often lasts longer than a week. It may stop on its own or with treatment.  · Long-lasting persistent atrial fibrillation. This type lasts longer than 12 months.  · Permanent atrial fibrillation. This type does not go away.  Talk with your health care provider to learn about the type of atrial fibrillation that you have.  CAUSES  This condition is caused by some heart-related conditions or procedures, including:  · A heart attack.  · Coronary artery disease.  · Heart failure.  · Heart valve conditions.  · High blood pressure.  · Inflammation of the sac that surrounds the heart (pericarditis).  · Heart surgery.  · Certain heart rhythm disorders, such as Dixon-Parkinson-White syndrome.  Other causes include:  · Pneumonia.  · Obstructive sleep apnea.  · Blockage of an artery in the lungs (pulmonary embolism, or PE).  · Lung cancer.  · Chronic lung disease.  · Thyroid problems, especially if the thyroid is overactive (hyperthyroidism).  · Caffeine.  · Excessive alcohol use or illegal drug use.  · Use of some medicines, including certain decongestants and diet pills.  Sometimes, the cause cannot be found.  RISK FACTORS  This condition is more likely to develop in:  · People who are older in age.  · People who smoke.  · People who have diabetes mellitus.  · People who are overweight (obese).  · Athletes who exercise  vigorously.  SYMPTOMS  Symptoms of this condition include:  · A feeling that your heart is beating rapidly or irregularly.  · A feeling of discomfort or pain in your chest.  · Shortness of breath.  · Sudden light-headedness or weakness.  · Getting tired easily during exercise.  In some cases, there are no symptoms.  DIAGNOSIS  Your health care provider may be able to detect atrial fibrillation when taking your pulse. If detected, this condition may be diagnosed with:  · An electrocardiogram (ECG).  · A Holter monitor test that records your heartbeat patterns over a 24-hour period.  · Transthoracic echocardiogram (TTE) to evaluate how blood flows through your heart.  · Transesophageal echocardiogram (TAMIKO) to view more detailed images of your heart.  · A stress test.  · Imaging tests, such as a CT scan or chest X-ray.  · Blood tests.  TREATMENT  The main goals of treatment are to prevent blood clots from forming and to keep your heart beating at a normal rate and rhythm. The type of treatment that you receive depends on many factors, such as your underlying medical conditions and how you feel when you are experiencing atrial fibrillation.  This condition may be treated with:  · Medicine to slow down the heart rate, bring the heart's rhythm back to normal, or prevent clots from forming.  · Electrical cardioversion. This is a procedure that resets your heart's rhythm by delivering a controlled, low-energy shock to the heart through your skin.  · Different types of ablation, such as catheter ablation, catheter ablation with pacemaker, or surgical ablation. These procedures destroy the heart tissues that send abnormal signals. When the pacemaker is used, it is placed under your skin to help your heart beat in a regular rhythm.  HOME CARE INSTRUCTIONS  · Take over-the counter and prescription medicines only as told by your health care provider.  · If your health care provider prescribed a blood-thinning medicine  (anticoagulant), take it exactly as told. Taking too much blood-thinning medicine can cause bleeding. If you do not take enough blood-thinning medicine, you will not have the protection that you need against stroke and other problems.  · Do not use tobacco products, including cigarettes, chewing tobacco, and e-cigarettes. If you need help quitting, ask your health care provider.  · If you have obstructive sleep apnea, manage your condition as told by your health care provider.  · Do not drink alcohol.  · Do not drink beverages that contain caffeine, such as coffee, soda, and tea.  · Maintain a healthy weight. Do not use diet pills unless your health care provider approves. Diet pills may make heart problems worse.  · Follow diet instructions as told by your health care provider.  · Exercise regularly as told by your health care provider.  · Keep all follow-up visits as told by your health care provider. This is important.  PREVENTION  · Avoid drinking beverages that contain caffeine or alcohol.  · Avoid certain medicines, especially medicines that are used for breathing problems.  · Avoid certain herbs and herbal medicines, such as those that contain ephedra or ginseng.  · Do not use illegal drugs, such as cocaine and amphetamines.  · Do not smoke.  · Manage your high blood pressure.  SEEK MEDICAL CARE IF:  · You notice a change in the rate, rhythm, or strength of your heartbeat.  · You are taking an anticoagulant and you notice increased bruising.  · You tire more easily when you exercise or exert yourself.  SEEK IMMEDIATE MEDICAL CARE IF:  · You have chest pain, abdominal pain, sweating, or weakness.  · You feel nauseous.  · You notice blood in your vomit, bowel movement, or urine.  · You have shortness of breath.  · You suddenly have swollen feet and ankles.  · You feel dizzy.  · You have sudden weakness or numbness of the face, arm, or leg, especially on one side of the body.  · You have trouble speaking,  trouble understanding, or both (aphasia).  · Your face or your eyelid droops on one side.  These symptoms may represent a serious problem that is an emergency. Do not wait to see if the symptoms will go away. Get medical help right away. Call your local emergency services (911 in the [place] Do not drive yourself to the hospital.     This information is not intended to replace advice given to you by your health care provider. Make sure you discuss any questions you have with your health care provider.     Document Released: 12/18/2006 Document Revised: 09/07/2016 Document Reviewed: 04/13/2016  [employer] Interactive Patient Education ©2017 [employer]

## 2017-09-19 ENCOUNTER — HOSPITAL ENCOUNTER (OUTPATIENT)
Dept: CARDIOLOGY | Facility: HOSPITAL | Age: 82
Discharge: HOME | End: 2017-09-19
Attending: INTERNAL MEDICINE | Admitting: INTERNAL MEDICINE

## 2017-09-19 VITALS
SYSTOLIC BLOOD PRESSURE: 101 MMHG | WEIGHT: 212 LBS | DIASTOLIC BLOOD PRESSURE: 81 MMHG | HEIGHT: 72 IN | BODY MASS INDEX: 28.71 KG/M2

## 2017-09-19 DIAGNOSIS — I48.0 PAROXYSMAL ATRIAL FIBRILLATION (HCC): ICD-10-CM

## 2017-09-19 PROCEDURE — 93306 TTE W/DOPPLER COMPLETE: CPT

## 2017-09-19 PROCEDURE — 93306 TTE W/DOPPLER COMPLETE: CPT | Performed by: INTERNAL MEDICINE

## 2017-09-22 LAB
BH CV ECHO MEAS - AI DEC SLOPE: 211 CM/SEC^2
BH CV ECHO MEAS - AI MAX PG: 52.7 MMHG
BH CV ECHO MEAS - AI MAX VEL: 363 CM/SEC
BH CV ECHO MEAS - AI P1/2T: 503.9 MSEC
BH CV ECHO MEAS - AO MAX PG (FULL): 17.8 MMHG
BH CV ECHO MEAS - AO MAX PG: 20.1 MMHG
BH CV ECHO MEAS - AO MEAN PG (FULL): 11 MMHG
BH CV ECHO MEAS - AO MEAN PG: 12 MMHG
BH CV ECHO MEAS - AO ROOT AREA (BSA CORRECTED): 1.5
BH CV ECHO MEAS - AO ROOT AREA: 8 CM^2
BH CV ECHO MEAS - AO ROOT DIAM: 3.2 CM
BH CV ECHO MEAS - AO V2 MAX: 224 CM/SEC
BH CV ECHO MEAS - AO V2 MEAN: 165 CM/SEC
BH CV ECHO MEAS - AO V2 VTI: 46.7 CM
BH CV ECHO MEAS - AVA(I,A): 1.1 CM^2
BH CV ECHO MEAS - AVA(I,D): 1.1 CM^2
BH CV ECHO MEAS - AVA(V,A): 1.1 CM^2
BH CV ECHO MEAS - AVA(V,D): 1.1 CM^2
BH CV ECHO MEAS - BSA(HAYCOCK): 2.2 M^2
BH CV ECHO MEAS - BSA: 2.2 M^2
BH CV ECHO MEAS - BZI_BMI: 28.8 KILOGRAMS/M^2
BH CV ECHO MEAS - BZI_METRIC_HEIGHT: 182.9 CM
BH CV ECHO MEAS - BZI_METRIC_WEIGHT: 96.2 KG
BH CV ECHO MEAS - CONTRAST EF 4CH: 64.4 ML/M^2
BH CV ECHO MEAS - EDV(CUBED): 85.2 ML
BH CV ECHO MEAS - EDV(MOD-SP4): 67.9 ML
BH CV ECHO MEAS - EDV(TEICH): 87.7 ML
BH CV ECHO MEAS - EF(CUBED): 74.6 %
BH CV ECHO MEAS - EF(MOD-SP4): 64.4 %
BH CV ECHO MEAS - EF(TEICH): 66.7 %
BH CV ECHO MEAS - ESV(CUBED): 21.6 ML
BH CV ECHO MEAS - ESV(MOD-SP4): 24.2 ML
BH CV ECHO MEAS - ESV(TEICH): 29.2 ML
BH CV ECHO MEAS - FS: 36.7 %
BH CV ECHO MEAS - IVS/LVPW: 0.95
BH CV ECHO MEAS - IVSD: 1.1 CM
BH CV ECHO MEAS - LA DIMENSION: 4.2 CM
BH CV ECHO MEAS - LA/AO: 1.3
BH CV ECHO MEAS - LAT PEAK E' VEL: 10.9 CM/SEC
BH CV ECHO MEAS - LV DIASTOLIC VOL/BSA (35-75): 31.1 ML/M^2
BH CV ECHO MEAS - LV MASS(C)D: 177.2 GRAMS
BH CV ECHO MEAS - LV MASS(C)DI: 81.1 GRAMS/M^2
BH CV ECHO MEAS - LV MAX PG: 2.3 MMHG
BH CV ECHO MEAS - LV MEAN PG: 1 MMHG
BH CV ECHO MEAS - LV SYSTOLIC VOL/BSA (12-30): 11.1 ML/M^2
BH CV ECHO MEAS - LV V1 MAX: 75.6 CM/SEC
BH CV ECHO MEAS - LV V1 MEAN: 52.5 CM/SEC
BH CV ECHO MEAS - LV V1 VTI: 16.7 CM
BH CV ECHO MEAS - LVIDD: 4.4 CM
BH CV ECHO MEAS - LVIDS: 2.8 CM
BH CV ECHO MEAS - LVLD AP4: 7.8 CM
BH CV ECHO MEAS - LVLS AP4: 6.2 CM
BH CV ECHO MEAS - LVOT AREA (M): 3.1 CM^2
BH CV ECHO MEAS - LVOT AREA: 3.1 CM^2
BH CV ECHO MEAS - LVOT DIAM: 2 CM
BH CV ECHO MEAS - LVPWD: 1.2 CM
BH CV ECHO MEAS - MED PEAK E' VEL: 7.62 CM/SEC
BH CV ECHO MEAS - MV DEC TIME: 0.16 SEC
BH CV ECHO MEAS - MV E MAX VEL: 107 CM/SEC
BH CV ECHO MEAS - RAP SYSTOLE: 5 MMHG
BH CV ECHO MEAS - RVSP: 28 MMHG
BH CV ECHO MEAS - SI(AO): 172 ML/M^2
BH CV ECHO MEAS - SI(CUBED): 29.1 ML/M^2
BH CV ECHO MEAS - SI(LVOT): 24 ML/M^2
BH CV ECHO MEAS - SI(MOD-SP4): 20 ML/M^2
BH CV ECHO MEAS - SI(TEICH): 26.8 ML/M^2
BH CV ECHO MEAS - SV(AO): 375.6 ML
BH CV ECHO MEAS - SV(CUBED): 63.6 ML
BH CV ECHO MEAS - SV(LVOT): 52.5 ML
BH CV ECHO MEAS - SV(MOD-SP4): 43.7 ML
BH CV ECHO MEAS - SV(TEICH): 58.5 ML
BH CV ECHO MEAS - TR MAX VEL: 240 CM/SEC
LEFT ATRIUM VOLUME INDEX: 30.7 ML/M2
LEFT ATRIUM VOLUME: 66.9 CM3

## 2017-11-29 ENCOUNTER — HOSPITAL ENCOUNTER (OUTPATIENT)
Dept: WOUND CARE | Age: 82
Discharge: HOME | End: 2017-11-29
Payer: MEDICARE

## 2017-11-29 VITALS
SYSTOLIC BLOOD PRESSURE: 138 MMHG | RESPIRATION RATE: 16 BRPM | HEART RATE: 94 BPM | TEMPERATURE: 97.5 F | BODY MASS INDEX: 27.09 KG/M2 | WEIGHT: 200 LBS | HEIGHT: 72 IN | DIASTOLIC BLOOD PRESSURE: 92 MMHG

## 2017-11-29 DIAGNOSIS — I87.312 CHRONIC VENOUS HYPERTENSION (IDIOPATHIC) WITH ULCER OF LEFT LOWER EXTREMITY (HCC): ICD-10-CM

## 2017-11-29 DIAGNOSIS — L97.222 NON-PRESSURE CHRONIC ULCER OF LEFT CALF WITH FAT LAYER EXPOSED (HCC): ICD-10-CM

## 2017-11-29 DIAGNOSIS — L97.929 CHRONIC VENOUS HYPERTENSION (IDIOPATHIC) WITH ULCER OF LEFT LOWER EXTREMITY (HCC): ICD-10-CM

## 2017-11-29 PROCEDURE — 97597 DBRDMT OPN WND 1ST 20 CM/<: CPT

## 2017-11-29 PROCEDURE — 97597 DBRDMT OPN WND 1ST 20 CM/<: CPT | Performed by: SURGERY

## 2017-11-29 PROCEDURE — 99213 OFFICE O/P EST LOW 20 MIN: CPT | Performed by: SURGERY

## 2017-11-29 PROCEDURE — 99213 OFFICE O/P EST LOW 20 MIN: CPT

## 2017-11-29 RX ORDER — DABIGATRAN ETEXILATE 150 MG/1
150 CAPSULE, COATED PELLETS ORAL 2 TIMES DAILY
COMMUNITY
End: 2019-02-22 | Stop reason: ALTCHOICE

## 2017-11-29 ASSESSMENT — PAIN SCALES - GENERAL: PAINLEVEL_OUTOF10: 0

## 2017-11-29 NOTE — PLAN OF CARE
Problem: Wound:  Goal: Will show signs of wound healing; wound closure and no evidence of infection  Will show signs of wound healing; wound closure and no evidence of infection  Outcome: Ongoing      Problem: Venous:  Goal: Signs of wound healing will improve  Signs of wound healing will improve  Outcome: Ongoing      Problem: Compression therapy:  Goal: Will be free from complications associated with compression therapy  Will be free from complications associated with compression therapy  Outcome: Ongoing      Problem: Falls - Risk of:  Goal: Will remain free from falls  Will remain free from falls  Outcome: Ongoing

## 2017-11-29 NOTE — PROGRESS NOTES
WOUND CARE HISTORY AND PHYSICAL    Patient Care Team:  Nicki Heard MD as PCP - General (Cardiothoracic Surgery)     CC:     Tamica Reeves is a 80 y.o. male who presents today for wound evaluation. Wound Type: venous  Wound Location: left lower leg(s): lower, anterior, medial  Modifying factors: edema and venous stasis    Patient Active Problem List   Diagnosis Code    Encounter for screening colonoscopy Z12.11    History of colonic polyps Z86.010    Non-pressure chronic ulcer of left calf with fat layer exposed ([address] L97.222    Hypertension I10    Chronic venous hypertension (idiopathic) with ulcer of left lower extremity ([address] I87.312       HPI:  He reports he developed a wound on left leg. This started 3 week(s) ago. He believes this is not healing. He has been applying nothing. He has not had  fever or chills. He has a history of venous disease. Tamica Reeves is a 80 y.o. male with the following history reviewed and recorded in [facility]:  Patient Active Problem List    Diagnosis Date Noted    Chronic venous hypertension (idiopathic) with ulcer of left lower extremity ([address] 11/04/2016    Non-pressure chronic ulcer of left calf with fat layer exposed ([address] 10/28/2016    Hypertension     Encounter for screening colonoscopy 09/18/2015    History of colonic polyps 09/18/2015     Current Outpatient Prescriptions   Medication Sig Dispense Refill    dabigatran (PRADAXA) 150 MG capsule Take 150 mg by mouth 2 times daily      Multiple Vitamins-Minerals (PRESERVISION AREDS PO) Take 1 tablet by mouth daily      acetaminophen (TYLENOL) 500 MG tablet Take 500 mg by mouth every 6 hours as needed for Pain      diphenhydrAMINE-APAP, sleep, (TYLENOL PM EXTRA STRENGTH)  MG tablet Take 1 tablet by mouth nightly as needed for Sleep      vitamin E 400 UNIT capsule Take 400 Units by mouth daily      silver sulfADIAZINE (SILVADENE) 1 % cream Apply topically daily Apply topically daily.       lisinopril-hydrochlorothiazide (PRINZIDE;ZESTORETIC) 20-12.5 MG per tablet Take by mouth daily       vitamin B-12 (CYANOCOBALAMIN) 1000 MCG tablet Take 1,000 mcg by mouth daily      Omega-3 Fatty Acids (FISH OIL PO) Take 1,300 mg by mouth daily       Glucosamine-Chondroit-Vit C-Mn (GLUCOSAMINE 1500 COMPLEX PO) Take 1,500 mg by mouth 2 times daily        Current Facility-Administered Medications   Medication Dose Route Frequency Provider Last Rate Last Dose    lidocaine (XYLOCAINE) 2 % jelly 1 Dose  1 Dose Topical Once Racheal Flower MD         Allergies:  Other  Past Medical History:   Diagnosis Date    Aortic valve sclerosis     Atrial premature beats     Chronic osteomyelitis, lower leg     Hypertension     Pernicious anemia        Past Surgical History:   Procedure Laterality Date    CHOLECYSTECTOMY  1999    COLONOSCOPY  11/10/2010    Dr. Ross Holt Left 2006    3 TOES,     JOINT REPLACEMENT Right 1996    Knee    JOINT REPLACEMENT Left 2013    KNEE    LEG SURGERY Left     Had plate and screws put in and then removed a few year later    LEG SURGERY Left 1959    FRACTURE, HAD METAL PLATE PLACED    LEG SURGERY Left 1960    PLATE AND SCREWS REMOVED, DUE TO OSTEOMYLITIS    LEG SURGERY Left 1996    DEBRIDEMENT AND REMOVAL OF INFECTED BONE    OTHER SURGICAL HISTORY Left 1992    MUSCLE FLAP OVER OPEN WOUND    SKIN GRAFT Left     leg     Family History   Problem Relation Age of Onset    Stroke Mother     Other Father      STATES HE BLED TO DEATH    Colon Cancer Neg Hx     Colon Polyps Neg Hx     Esophageal Cancer Neg Hx     Liver Cancer Neg Hx     Liver Disease Neg Hx     Rectal Cancer Neg Hx     Stomach Cancer Neg Hx      Social History   Substance Use Topics    Smoking status: Former Smoker     Quit date: 7/18/1991    Smokeless tobacco: Never Used    Alcohol use No         Review of Systems    Constitutional with a foul odor  * Bleeding  * Increase in swelling  * Need for compression bandage changes due to slippage, breakthrough drainage. If you need medical attention outside of the business hours of the [address] [place] [address] Road please contact your PCP or go to the nearest emergency room.         Electronically signed by Freddie Arciniega MD on 11/29/17 at 9:15 AM

## 2017-12-06 ENCOUNTER — HOSPITAL ENCOUNTER (OUTPATIENT)
Dept: WOUND CARE | Age: 82
Discharge: HOME | End: 2017-12-06
Payer: MEDICARE

## 2017-12-06 VITALS
BODY MASS INDEX: 27.09 KG/M2 | RESPIRATION RATE: 18 BRPM | HEIGHT: 72 IN | DIASTOLIC BLOOD PRESSURE: 90 MMHG | TEMPERATURE: 98.3 F | HEART RATE: 92 BPM | SYSTOLIC BLOOD PRESSURE: 138 MMHG | WEIGHT: 200 LBS

## 2017-12-06 DIAGNOSIS — I87.312 CHRONIC VENOUS HYPERTENSION (IDIOPATHIC) WITH ULCER OF LEFT LOWER EXTREMITY (HCC): ICD-10-CM

## 2017-12-06 DIAGNOSIS — L97.222 NON-PRESSURE CHRONIC ULCER OF LEFT CALF WITH FAT LAYER EXPOSED (HCC): ICD-10-CM

## 2017-12-06 DIAGNOSIS — L97.929 CHRONIC VENOUS HYPERTENSION (IDIOPATHIC) WITH ULCER OF LEFT LOWER EXTREMITY (HCC): ICD-10-CM

## 2017-12-06 PROCEDURE — 97597 DBRDMT OPN WND 1ST 20 CM/<: CPT | Performed by: SURGERY

## 2017-12-06 PROCEDURE — 97597 DBRDMT OPN WND 1ST 20 CM/<: CPT

## 2017-12-06 ASSESSMENT — PAIN DESCRIPTION - PAIN TYPE: TYPE: ACUTE PAIN

## 2017-12-06 ASSESSMENT — PAIN DESCRIPTION - ORIENTATION: ORIENTATION: LEFT

## 2017-12-06 ASSESSMENT — PAIN SCALES - GENERAL: PAINLEVEL_OUTOF10: 0

## 2017-12-06 ASSESSMENT — PAIN DESCRIPTION - LOCATION: LOCATION: LEG

## 2017-12-06 NOTE — PROGRESS NOTES
Neg Hx     Rectal Cancer Neg Hx     Stomach Cancer Neg Hx        SOCIAL HISTORY    Social History   Substance Use Topics    Smoking status: Former Smoker     Quit date: 7/18/1991    Smokeless tobacco: Never Used    Alcohol use No       ALLERGIES    Allergies   Allergen Reactions    Other Rash     PT STATES PLASTIC GLOVES BROKE HIS HANDS OUT, HE DENIES REACTION TO TAPE OR BANDAIDS       MEDICATIONS    Current Outpatient Prescriptions on File Prior to Encounter   Medication Sig Dispense Refill    dabigatran (PRADAXA) 150 MG capsule Take 150 mg by mouth 2 times daily      Multiple Vitamins-Minerals (PRESERVISION AREDS PO) Take 1 tablet by mouth daily      acetaminophen (TYLENOL) 500 MG tablet Take 500 mg by mouth every 6 hours as needed for Pain      diphenhydrAMINE-APAP, sleep, (TYLENOL PM EXTRA STRENGTH)  MG tablet Take 1 tablet by mouth nightly as needed for Sleep      vitamin E 400 UNIT capsule Take 400 Units by mouth daily      lisinopril-hydrochlorothiazide (PRINZIDE;ZESTORETIC) 20-12.5 MG per tablet Take by mouth daily       vitamin B-12 (CYANOCOBALAMIN) 1000 MCG tablet Take 1,000 mcg by mouth daily      Omega-3 Fatty Acids (FISH OIL PO) Take 1,300 mg by mouth daily       Glucosamine-Chondroit-Vit C-Mn (GLUCOSAMINE 1500 COMPLEX PO) Take 1,500 mg by mouth 2 times daily       silver sulfADIAZINE (SILVADENE) 1 % cream Apply topically daily Apply topically daily. No current facility-administered medications on file prior to encounter. REVIEW OF SYSTEMS    Pertinent items are noted in HPI.     Objective:      BP (!) 138/90   Pulse 92   Temp 98.3 °F (36.8 °C) (Temporal)   Resp 18   Ht 6' (1.829 m)   Wt 200 lb (90.7 kg)   BMI 27.12 kg/m²     Wt Readings from Last 3 Encounters:   12/06/17 200 lb (90.7 kg)   11/29/17 200 lb (90.7 kg)   03/28/17 212 lb (96.2 kg)       PHYSICAL EXAM    General Appearance: alert and oriented to person, place and time, well developed and well- biofilm, slough and exudate      Pre Debridement Measurements:  Are located in the Wound/Ulcer Documentation Flow Sheet    Wound/Ulcer #: 3    Percent of Wound(s)/Ulcer(s) Debrided: 100%    Total Surface Area Debrided:  0.25 sq cm       Diabetic/Pressure/Non Pressure Ulcers only:  Ulcer: N/A           Post Debridement Measurements:    Wound/Ulcer Descriptions are Pre Debridement --EXCEPT MEASUREMENTS    Wound 10/28/16 Venous ulcer Leg Left WOUND 2,VENOUS, L. ANTERIOR LOWER LEG (Active)   Dressing Status Intact 3/28/2017  8:00 AM   Dressing Changed Changed/New 3/14/2017  8:09 AM   Dressing/Treatment Open to air 3/28/2017  8:00 AM   Wound Cleansed Other (Comment) 3/14/2017  8:09 AM   Wound Length (cm) 0 cm 3/28/2017  8:00 AM   Wound Width (cm) 0 cm 3/28/2017  8:00 AM   Wound Depth (cm)  0 3/28/2017  8:00 AM   Calculated Wound Size (cm^2) (l*w) 0 cm^2 3/28/2017  8:00 AM   Change in Wound Size % (l*w) 100 3/28/2017  8:00 AM   Distance Tunneling (cm) 0 cm 2/28/2017  8:00 AM   Tunneling Position ___ O'Clock 0 2/28/2017  8:00 AM   Undermining Starts ___ O'Clock 0 2/28/2017  8:00 AM   Undermining Ends___ O'Clock 0 2/28/2017  8:00 AM   Undermining Maxium Distance (cm) 0 2/28/2017  8:00 AM   Wound Assessment Pink 3/28/2017  8:00 AM   Drainage Amount Moderate 3/14/2017  8:09 AM   Drainage Description Serosanguinous 3/14/2017  8:09 AM   Odor None 3/14/2017  8:09 AM   Margins Defined edges 2/28/2017  8:00 AM   Rima-wound Assessment Intact 3/28/2017  8:00 AM   Non-staged Wound Description Full thickness 3/28/2017  8:00 AM   [place]%Wound Bed 0 2/28/2017  8:00 AM   Red%Wound Bed 95 3/14/2017  8:09 AM   Yellow%Wound Bed 5 3/14/2017  8:09 AM   Black%Wound Bed 0 2/28/2017  8:00 AM   Purple%Wound Bed 0 2/28/2017  8:00 AM   Debridement per physician None 3/28/2017  8:00 AM   Time out N/A 3/28/2017  8:00 AM   Procedural Pain 0 2/21/2017  8:43 AM   Post procedural Pain 0 2/21/2017  8:43 AM   Number of days: 404       Wound 11/29/17 Venous

## 2017-12-13 ENCOUNTER — HOSPITAL ENCOUNTER (OUTPATIENT)
Dept: WOUND CARE | Age: 82
Discharge: HOME | End: 2017-12-13
Payer: MEDICARE

## 2017-12-13 VITALS
WEIGHT: 200 LBS | HEART RATE: 93 BPM | TEMPERATURE: 97.1 F | RESPIRATION RATE: 16 BRPM | BODY MASS INDEX: 27.09 KG/M2 | SYSTOLIC BLOOD PRESSURE: 138 MMHG | HEIGHT: 72 IN | DIASTOLIC BLOOD PRESSURE: 78 MMHG

## 2017-12-13 DIAGNOSIS — I87.312 CHRONIC VENOUS HYPERTENSION (IDIOPATHIC) WITH ULCER OF LEFT LOWER EXTREMITY (HCC): ICD-10-CM

## 2017-12-13 DIAGNOSIS — L97.222 NON-PRESSURE CHRONIC ULCER OF LEFT CALF WITH FAT LAYER EXPOSED (HCC): ICD-10-CM

## 2017-12-13 DIAGNOSIS — L97.929 CHRONIC VENOUS HYPERTENSION (IDIOPATHIC) WITH ULCER OF LEFT LOWER EXTREMITY (HCC): ICD-10-CM

## 2017-12-13 PROCEDURE — 97597 DBRDMT OPN WND 1ST 20 CM/<: CPT | Performed by: SURGERY

## 2017-12-13 PROCEDURE — 97597 DBRDMT OPN WND 1ST 20 CM/<: CPT

## 2017-12-13 ASSESSMENT — PAIN SCALES - GENERAL: PAINLEVEL_OUTOF10: 0

## 2017-12-13 NOTE — PROGRESS NOTES
Neg Hx     Stomach Cancer Neg Hx        SOCIAL HISTORY    Social History   Substance Use Topics    Smoking status: Former Smoker     Quit date: 7/18/1991    Smokeless tobacco: Never Used    Alcohol use No       ALLERGIES    Allergies   Allergen Reactions    Other Rash     PT STATES PLASTIC GLOVES BROKE HIS HANDS OUT, HE DENIES REACTION TO TAPE OR BANDAIDS       MEDICATIONS    Current Outpatient Prescriptions on File Prior to Encounter   Medication Sig Dispense Refill    dabigatran (PRADAXA) 150 MG capsule Take 150 mg by mouth 2 times daily      Multiple Vitamins-Minerals (PRESERVISION AREDS PO) Take 1 tablet by mouth daily      acetaminophen (TYLENOL) 500 MG tablet Take 500 mg by mouth every 6 hours as needed for Pain      diphenhydrAMINE-APAP, sleep, (TYLENOL PM EXTRA STRENGTH)  MG tablet Take 1 tablet by mouth nightly as needed for Sleep      vitamin E 400 UNIT capsule Take 400 Units by mouth daily      silver sulfADIAZINE (SILVADENE) 1 % cream Apply topically daily Apply topically daily.  lisinopril-hydrochlorothiazide (PRINZIDE;ZESTORETIC) 20-12.5 MG per tablet Take by mouth daily       vitamin B-12 (CYANOCOBALAMIN) 1000 MCG tablet Take 1,000 mcg by mouth daily      Omega-3 Fatty Acids (FISH OIL PO) Take 1,300 mg by mouth daily       Glucosamine-Chondroit-Vit C-Mn (GLUCOSAMINE 1500 COMPLEX PO) Take 1,500 mg by mouth 2 times daily        No current facility-administered medications on file prior to encounter. REVIEW OF SYSTEMS    Pertinent items are noted in HPI.     Objective:      /78   Pulse 93   Temp 97.1 °F (36.2 °C) (Temporal)   Resp 16   Ht 6' (1.829 m)   Wt 200 lb (90.7 kg)   BMI 27.12 kg/m²     Wt Readings from Last 3 Encounters:   12/13/17 200 lb (90.7 kg)   12/06/17 200 lb (90.7 kg)   11/29/17 200 lb (90.7 kg)       PHYSICAL EXAM    General Appearance: alert and oriented to person, place and time, well developed and well- nourished, in no acute distress  Skin: warm and dry, no rash or erythema  Head: normocephalic and atraumatic  Eyes: pupils equal, round, and reactive to light, extraocular eye movements intact, conjunctivae normal  ENT: tympanic membrane, external ear and ear canal normal bilaterally, nose without deformity, nasal mucosa and turbinates normal without polyps  Neck: supple and non-tender without mass, no thyromegaly or thyroid nodules, no cervical lymphadenopathy  Pulmonary/Chest: clear to auscultation bilaterally- no wheezes, rales or rhonchi, normal air movement, no respiratory distress  Cardiovascular: normal rate, regular rhythm, normal S1 and S2, no murmurs, rubs, clicks, or gallops, distal pulses intact, no carotid bruits  Abdomen: soft, non-tender, non-distended, normal bowel sounds, no masses or organomegaly  Extremities: no cyanosis, clubbing or edema  Musculoskeletal: normal range of motion, no joint swelling, deformity or tenderness  Neurologic: reflexes normal and symmetric, no cranial nerve deficit, gait, coordination and speech normal      Assessment:      Patient Active Problem List   Diagnosis Code    Encounter for screening colonoscopy Z12.11    History of colonic polyps Z86.010    Non-pressure chronic ulcer of left calf with fat layer exposed ([address] L97.222    Hypertension I10    Chronic venous hypertension (idiopathic) with ulcer of left lower extremity ([address] I87.312        Procedure Note  Indications:  Based on my examination of this patient's wound(s)/ulcer(s) today, debridement is required to promote healing and evaluate the wound base. Performed by: Marlene Tam MD    Consent obtained:  Yes    Time out taken:  Yes    Pain Control: Anesthetic  Anesthetic: 2% Lidocaine Gel Topical       Debridement:Non-excisional Debridement    Using curette the wound(s)/ulcer(s) was/were sharply debrided down through and including the removal of epidermis and dermis.         Devitalized Tissue Debrided:  fibrin, biofilm, slough and exudate      Pre Debridement Measurements:  Are located in the Wound/Ulcer Documentation Flow Sheet    Wound/Ulcer #: 3 and 4    Percent of Wound(s)/Ulcer(s) Debrided: 100%    Total Surface Area Debrided:  0.6 sq cm       Diabetic/Pressure/Non Pressure Ulcers only:  Ulcer: N/A           Post Debridement Measurements:    Wound/Ulcer Descriptions are Pre Debridement --EXCEPT MEASUREMENTS    Wound 10/28/16 Venous ulcer Leg Left WOUND 2,VENOUS, L. ANTERIOR LOWER LEG (Active)   Dressing Status Intact 3/28/2017  8:00 AM   Dressing Changed Changed/New 3/14/2017  8:09 AM   Dressing/Treatment Open to air 3/28/2017  8:00 AM   Wound Cleansed Other (Comment) 3/14/2017  8:09 AM   Wound Length (cm) 0 cm 3/28/2017  8:00 AM   Wound Width (cm) 0 cm 3/28/2017  8:00 AM   Wound Depth (cm)  0 3/28/2017  8:00 AM   Calculated Wound Size (cm^2) (l*w) 0 cm^2 3/28/2017  8:00 AM   Change in Wound Size % (l*w) 100 3/28/2017  8:00 AM   Distance Tunneling (cm) 0 cm 2/28/2017  8:00 AM   Tunneling Position ___ O'Clock 0 2/28/2017  8:00 AM   Undermining Starts ___ O'Clock 0 2/28/2017  8:00 AM   Undermining Ends___ O'Clock 0 2/28/2017  8:00 AM   Undermining Maxium Distance (cm) 0 2/28/2017  8:00 AM   Wound Assessment Pink 3/28/2017  8:00 AM   Drainage Amount Moderate 3/14/2017  8:09 AM   Drainage Description Serosanguinous 3/14/2017  8:09 AM   Odor None 3/14/2017  8:09 AM   Margins Defined edges 2/28/2017  8:00 AM   Rima-wound Assessment Intact 3/28/2017  8:00 AM   Non-staged Wound Description Full thickness 3/28/2017  8:00 AM   [place]%Wound Bed 0 2/28/2017  8:00 AM   Red%Wound Bed 95 3/14/2017  8:09 AM   Yellow%Wound Bed 5 3/14/2017  8:09 AM   Black%Wound Bed 0 2/28/2017  8:00 AM   Purple%Wound Bed 0 2/28/2017  8:00 AM   Debridement per physician None 3/28/2017  8:00 AM   Time out N/A 3/28/2017  8:00 AM   Procedural Pain 0 2/21/2017  8:43 AM   Post procedural Pain 0 2/21/2017  8:43 AM   Number of days: 411       Wound 11/29/17 Venous ulcer Ankle if you are unable to keep, kindly give a 24 hour notice. Thank you.)    If you experience any of the following, please call the [address] during business hours:    * Increase in Pain  * Temperature over 101  * Increase in drainage from your wound  * Drainage with a foul odor  * Bleeding  * Increase in swelling  * Need for compression bandage changes due to slippage, breakthrough drainage. If you need medical attention outside of the business hours of the [address] please contact your PCP or go to the nearest emergency room.         Electronically signed by Jones Aragon MD on 12/13/2017 at 10:05 AM

## 2017-12-20 ENCOUNTER — HOSPITAL ENCOUNTER (OUTPATIENT)
Dept: WOUND CARE | Age: 82
Discharge: HOME | End: 2017-12-20
Payer: MEDICARE

## 2017-12-20 VITALS
HEIGHT: 72 IN | TEMPERATURE: 97.1 F | BODY MASS INDEX: 27.09 KG/M2 | DIASTOLIC BLOOD PRESSURE: 87 MMHG | WEIGHT: 200 LBS | RESPIRATION RATE: 18 BRPM | SYSTOLIC BLOOD PRESSURE: 152 MMHG | HEART RATE: 79 BPM

## 2017-12-20 DIAGNOSIS — L97.929 CHRONIC VENOUS HYPERTENSION (IDIOPATHIC) WITH ULCER OF LEFT LOWER EXTREMITY (HCC): ICD-10-CM

## 2017-12-20 DIAGNOSIS — L97.222 NON-PRESSURE CHRONIC ULCER OF LEFT CALF WITH FAT LAYER EXPOSED (HCC): ICD-10-CM

## 2017-12-20 DIAGNOSIS — I87.312 CHRONIC VENOUS HYPERTENSION (IDIOPATHIC) WITH ULCER OF LEFT LOWER EXTREMITY (HCC): ICD-10-CM

## 2017-12-20 PROCEDURE — 97597 DBRDMT OPN WND 1ST 20 CM/<: CPT

## 2017-12-20 PROCEDURE — 97597 DBRDMT OPN WND 1ST 20 CM/<: CPT | Performed by: SURGERY

## 2017-12-20 ASSESSMENT — PAIN DESCRIPTION - ONSET: ONSET: ON-GOING

## 2017-12-20 ASSESSMENT — PAIN DESCRIPTION - ORIENTATION: ORIENTATION: LEFT

## 2017-12-20 ASSESSMENT — PAIN SCALES - GENERAL: PAINLEVEL_OUTOF10: 0

## 2017-12-20 ASSESSMENT — PAIN DESCRIPTION - PROGRESSION: CLINICAL_PROGRESSION: NOT CHANGED

## 2017-12-20 ASSESSMENT — PAIN DESCRIPTION - DESCRIPTORS: DESCRIPTORS: SORE

## 2017-12-20 ASSESSMENT — PAIN DESCRIPTION - FREQUENCY: FREQUENCY: INTERMITTENT

## 2017-12-20 ASSESSMENT — PAIN DESCRIPTION - PAIN TYPE: TYPE: ACUTE PAIN

## 2017-12-20 ASSESSMENT — PAIN DESCRIPTION - LOCATION: LOCATION: LEG

## 2017-12-20 NOTE — PROGRESS NOTES
[address]   Progress Note and Procedure Note      Maye Remy RECORD NUMBER:  [id number]  AGE: 80 y.o. GENDER: male  : 1933  EPISODE DATE:  2017    Subjective:     Chief Complaint   Patient presents with    Wound Check     leg         HISTORY of PRESENT ILLNESS HPI     Aleshia Goldsmith is a 80 y.o. male who presents today for wound/ulcer evaluation.    History of Wound Context: Pt with LLE wound here for eval/treat  Wound/Ulcer Pain Timing/Severity: none  Quality of pain: N/A  Severity:  0 / 10   Modifying Factors: None  Associated Signs/Symptoms: edema    Ulcer Identification:  Ulcer Type: venous  Contributing Factors: edema and venous stasis    Wound: venous LLE        PAST MEDICAL HISTORY        Diagnosis Date    Aortic valve sclerosis     Atrial premature beats     Chronic osteomyelitis, lower leg     Hypertension     Pernicious anemia        PAST SURGICAL HISTORY    Past Surgical History:   Procedure Laterality Date    CHOLECYSTECTOMY      COLONOSCOPY  11/10/2010    Dr. Paula Montanez Left     3 TOES,     JOINT REPLACEMENT Right     Knee    JOINT REPLACEMENT Left     KNEE    LEG SURGERY Left     Had plate and screws put in and then removed a few year later    LEG SURGERY Left     FRACTURE, HAD METAL PLATE PLACED    LEG SURGERY Left     PLATE AND SCREWS REMOVED, DUE TO OSTEOMYLITIS    LEG SURGERY Left     DEBRIDEMENT AND REMOVAL OF INFECTED BONE    OTHER SURGICAL HISTORY Left     MUSCLE FLAP OVER OPEN WOUND    SKIN GRAFT Left     leg       FAMILY HISTORY    Family History   Problem Relation Age of Onset    Stroke Mother     Other Father      STATES HE BLED TO DEATH    Colon Cancer Neg Hx     Colon Polyps Neg Hx     Esophageal Cancer Neg Hx     Liver Cancer Neg Hx     Liver Disease Neg Hx     Rectal Cancer Neg Hx     Stomach Cancer Neg Hx        SOCIAL HISTORY    Social History   Substance Use Topics    Smoking status: Former Smoker     Quit date: 7/18/1991    Smokeless tobacco: Never Used    Alcohol use No       ALLERGIES    Allergies   Allergen Reactions    Other Rash     PT STATES PLASTIC GLOVES BROKE HIS HANDS OUT, HE DENIES REACTION TO TAPE OR BANDAIDS       MEDICATIONS    Current Outpatient Prescriptions on File Prior to Encounter   Medication Sig Dispense Refill    dabigatran (PRADAXA) 150 MG capsule Take 150 mg by mouth 2 times daily      Multiple Vitamins-Minerals (PRESERVISION AREDS PO) Take 1 tablet by mouth daily      acetaminophen (TYLENOL) 500 MG tablet Take 500 mg by mouth every 6 hours as needed for Pain      diphenhydrAMINE-APAP, sleep, (TYLENOL PM EXTRA STRENGTH)  MG tablet Take 1 tablet by mouth nightly as needed for Sleep      vitamin E 400 UNIT capsule Take 400 Units by mouth daily      silver sulfADIAZINE (SILVADENE) 1 % cream Apply topically daily Apply topically daily.  lisinopril-hydrochlorothiazide (PRINZIDE;ZESTORETIC) 20-12.5 MG per tablet Take by mouth daily       vitamin B-12 (CYANOCOBALAMIN) 1000 MCG tablet Take 1,000 mcg by mouth daily      Omega-3 Fatty Acids (FISH OIL PO) Take 1,300 mg by mouth daily       Glucosamine-Chondroit-Vit C-Mn (GLUCOSAMINE 1500 COMPLEX PO) Take 1,500 mg by mouth 2 times daily        No current facility-administered medications on file prior to encounter. REVIEW OF SYSTEMS    Pertinent items are noted in HPI.     Objective:      BP (!) 152/87   Pulse 79   Temp 97.1 °F (36.2 °C) (Temporal)   Resp 18   Ht 6' (1.829 m)   Wt 200 lb (90.7 kg)   BMI 27.12 kg/m²     Wt Readings from Last 3 Encounters:   12/20/17 200 lb (90.7 kg)   12/13/17 200 lb (90.7 kg)   12/06/17 200 lb (90.7 kg)       PHYSICAL EXAM    General Appearance: alert and oriented to person, place and time, well developed and well- nourished, in no acute distress  Skin: warm and dry, no rash or erythema  Head: normocephalic and atraumatic  Eyes: pupils equal, round, and reactive to light, extraocular eye movements intact, conjunctivae normal  ENT: tympanic membrane, external ear and ear canal normal bilaterally, nose without deformity, nasal mucosa and turbinates normal without polyps  Neck: supple and non-tender without mass, no thyromegaly or thyroid nodules, no cervical lymphadenopathy  Pulmonary/Chest: clear to auscultation bilaterally- no wheezes, rales or rhonchi, normal air movement, no respiratory distress  Cardiovascular: normal rate, regular rhythm, normal S1 and S2, no murmurs, rubs, clicks, or gallops, distal pulses intact, no carotid bruits  Abdomen: soft, non-tender, non-distended, normal bowel sounds, no masses or organomegaly  Extremities: no cyanosis, clubbing or edema  Musculoskeletal: normal range of motion, no joint swelling, deformity or tenderness  Neurologic: reflexes normal and symmetric, no cranial nerve deficit, gait, coordination and speech normal      Assessment:      Patient Active Problem List   Diagnosis Code    Encounter for screening colonoscopy Z12.11    History of colonic polyps Z86.010    Non-pressure chronic ulcer of left calf with fat layer exposed ([address] L97.222    Hypertension I10    Chronic venous hypertension (idiopathic) with ulcer of left lower extremity ([address] I87.312        Procedure Note  Indications:  Based on my examination of this patient's wound(s)/ulcer(s) today, debridement is required to promote healing and evaluate the wound base. Performed by: Maryanna Ahumada, MD    Consent obtained:  Yes    Time out taken:  Yes    Pain Control:         Debridement:Non-excisional Debridement    Using curette the wound(s)/ulcer(s) was/were sharply debrided down through and including the removal of epidermis.         Devitalized Tissue Debrided:  fibrin, biofilm, slough and exudate      Pre Debridement Measurements:  Are located in the [facility]  Documentation Flow Sheet    Wound/Ulcer #: 4    Percent of Wound(s)/Ulcer(s) Debrided: 100%    Total Surface Area Debrided:  0.01 sq cm       Diabetic/Pressure/Non Pressure Ulcers only:  Ulcer: N/A           Post Debridement Measurements:    Wound/Ulcer Descriptions are Pre Debridement --EXCEPT MEASUREMENTS    Wound 10/28/16 Venous ulcer Leg Left WOUND 2,VENOUS, L. ANTERIOR LOWER LEG (Active)   Dressing Status Intact 3/28/2017  8:00 AM   Dressing Changed Changed/New 3/14/2017  8:09 AM   Dressing/Treatment Open to air 3/28/2017  8:00 AM   Wound Cleansed Other (Comment) 3/14/2017  8:09 AM   Wound Length (cm) 0 cm 3/28/2017  8:00 AM   Wound Width (cm) 0 cm 3/28/2017  8:00 AM   Wound Depth (cm)  0 3/28/2017  8:00 AM   Calculated Wound Size (cm^2) (l*w) 0 cm^2 3/28/2017  8:00 AM   Change in Wound Size % (l*w) 100 3/28/2017  8:00 AM   Distance Tunneling (cm) 0 cm 2/28/2017  8:00 AM   Tunneling Position ___ O'Clock 0 2/28/2017  8:00 AM   Undermining Starts ___ O'Clock 0 2/28/2017  8:00 AM   Undermining Ends___ O'Clock 0 2/28/2017  8:00 AM   Undermining Maxium Distance (cm) 0 2/28/2017  8:00 AM   Wound Assessment Pink 3/28/2017  8:00 AM   Drainage Amount Moderate 3/14/2017  8:09 AM   Drainage Description Serosanguinous 3/14/2017  8:09 AM   Odor None 3/14/2017  8:09 AM   Margins Defined edges 2/28/2017  8:00 AM   Rima-wound Assessment Intact 3/28/2017  8:00 AM   Non-staged Wound Description Full thickness 3/28/2017  8:00 AM   [place]%Wound Bed 0 2/28/2017  8:00 AM   Red%Wound Bed 95 3/14/2017  8:09 AM   Yellow%Wound Bed 5 3/14/2017  8:09 AM   Black%Wound Bed 0 2/28/2017  8:00 AM   Purple%Wound Bed 0 2/28/2017  8:00 AM   Debridement per physician None 3/28/2017  8:00 AM   Time out N/A 3/28/2017  8:00 AM   Procedural Pain 0 2/21/2017  8:43 AM   Post procedural Pain 0 2/21/2017  8:43 AM   Number of days: 418       Wound 11/29/17 Venous ulcer Ankle Left; Lower;Medial #3 Venous Ulcer Left Lower Ext (Active)   Wound Type Wound 12/20/2017  9:03 AM   Wound Venous 12/20/2017  9:03 AM   Dressing Status Old drainage 12/20/2017  9:03 AM   Dressing Changed Changed/New 12/13/2017  9:54 AM   Wound Cleansed Other (Comment) 12/20/2017  9:03 AM   Wound Length (cm) 0.1 cm 12/20/2017  9:09 AM   Wound Width (cm) 0.1 cm 12/20/2017  9:09 AM   Wound Depth (cm)  .01 12/20/2017  9:09 AM   Calculated Wound Size (cm^2) (l*w) 0.01 cm^2 12/20/2017  9:09 AM   Change in Wound Size % (l*w) 97.92 12/20/2017  9:09 AM   Distance Tunneling (cm) 0 cm 11/29/2017  8:21 AM   Tunneling Position ___ O'Clock 0 11/29/2017  8:21 AM   Undermining Starts ___ O'Clock 0 11/29/2017  8:21 AM   Undermining Ends___ O'Clock 0 11/29/2017  8:21 AM   Undermining Maxium Distance (cm) 0 11/29/2017  8:21 AM   Wound Assessment Pink;Red;Slough; Yellow 12/20/2017  9:03 AM   Drainage Amount Moderate 12/20/2017  9:03 AM   Drainage Description Serosanguinous 12/20/2017  9:03 AM   Odor Mild 12/20/2017  9:03 AM   Margins Attached edges 12/20/2017  9:03 AM   Rima-wound Assessment Pink;Red 12/20/2017  9:03 AM   Non-staged Wound Description Full thickness 12/20/2017  9:03 AM   Red%Wound Bed 50 12/20/2017  9:03 AM   Yellow%Wound Bed 50 12/20/2017  9:03 AM   Culture Taken No 11/29/2017  8:21 AM   Debridement per physician Partial thickness 12/20/2017  9:09 AM   Time out Yes 12/20/2017  9:09 AM   Procedural Pain 0 12/13/2017  9:54 AM   Post procedural Pain 0 12/13/2017  9:54 AM   Number of days: 21       Wound 12/13/17 Abrasion(s) Foot Left; Anterior #4 Left Ant Foot Venous/Trauma (Active)   Wound Type Wound 12/20/2017  9:03 AM   Wound Venous 12/20/2017  9:03 AM   Dressing Status Old drainage 12/20/2017  9:03 AM   Dressing Changed Changed/New 12/13/2017  9:54 AM   Wound Length (cm) 0.1 cm 12/20/2017  9:09 AM   Wound Width (cm) 0.1 cm 12/20/2017  9:09 AM   Wound Depth (cm)  0.1 12/20/2017  9:09 AM   Calculated Wound Size (cm^2) (l*w) 0.01 cm^2 12/20/2017  9:09 AM   Change in Wound Size % (l*w) 97.22 12/20/2017  9:09 AM   Distance Tunneling (cm) 0 cm 12/20/2017  9:03 AM   Tunneling Position ___ O'Clock 0 12/20/2017  9:03 AM   Undermining Starts ___ O'Clock 0 12/20/2017  9:03 AM   Undermining Ends___ O'Clock 0 12/20/2017  9:03 AM   Undermining Maxium Distance (cm) 0 12/20/2017  9:03 AM   Drainage Amount Moderate 12/20/2017  9:03 AM   Drainage Description Serosanguinous 12/20/2017  9:03 AM   Odor Mild 12/20/2017  9:03 AM   Rima-wound Assessment Pink;Red 12/20/2017  9:03 AM   Non-staged Wound Description Full thickness 12/20/2017  9:03 AM   Red%Wound Bed 50 12/20/2017  9:03 AM   Yellow%Wound Bed 50 12/20/2017  9:03 AM   Culture Taken No 12/20/2017  9:03 AM   Debridement per physician Partial thickness 12/20/2017  9:09 AM   Time out Yes 12/20/2017  9:09 AM   Procedural Pain 0 12/20/2017  9:09 AM   Post procedural Pain 0 12/20/2017  9:09 AM   Number of days: 6         Estimated Blood Loss:  Minimal    Hemostasis Achieved:  by pressure    Procedural Pain:  0  / 10     Post Procedural Pain:  0 / 10     Response to treatment:  Well tolerated by patient. Plan:     Treatment Note please see attached Discharge Instructions    In my professional opinion this patient would benefit from HBO Therapy: No    Written patient dismissal instructions given to patient and signed by patient or POA. Discharge Instructions       Visit Discharge/Physician Orders    Discharge condition: Stable    Discharge to: Home    Left via:Private automobile    Accompanied by:  [employer]    ECF/[facility]: [facility]    Dressing Orders:  Left leg wound:   Wash with warm soap and water. Moisturize. apply Zinc Coflex unna boot wrapping from toe to knee. Treatment Orders:  Keep dressing clean and dry. Elevate leg 2-3 times per day. [address] [place] [address],[address] followup visit ____Nurse visit next week, Dr. Santos Mejia in 2 weeks_________________________  (Please note your next appointment above and if you are unable to keep, kindly give a 24 hour notice. Thank you.)          If you experience any of the following, please call the [facility] during business hours:    * Increase in Pain  * Temperature over 101  * Increase in drainage from your wound  * Drainage with a foul odor  * Bleeding  * Increase in swelling  * Need for compression bandage changes due to slippage, breakthrough drainage. If you need medical attention outside of the business hours of the [facility] please contact your PCP or go to the nearest emergency room.           Electronically signed by Ebonie Lopez MD on 12/20/2017 at 9:28 AM

## 2017-12-27 ENCOUNTER — HOSPITAL ENCOUNTER (OUTPATIENT)
Dept: WOUND CARE | Age: 82
Discharge: HOME | End: 2017-12-27
Payer: MEDICARE

## 2017-12-27 VITALS
BODY MASS INDEX: 27.09 KG/M2 | RESPIRATION RATE: 18 BRPM | WEIGHT: 200 LBS | TEMPERATURE: 98.2 F | HEIGHT: 72 IN | HEART RATE: 75 BPM | SYSTOLIC BLOOD PRESSURE: 128 MMHG | DIASTOLIC BLOOD PRESSURE: 85 MMHG

## 2017-12-27 DIAGNOSIS — L97.929 CHRONIC VENOUS HYPERTENSION (IDIOPATHIC) WITH ULCER OF LEFT LOWER EXTREMITY (HCC): ICD-10-CM

## 2017-12-27 DIAGNOSIS — I87.312 CHRONIC VENOUS HYPERTENSION (IDIOPATHIC) WITH ULCER OF LEFT LOWER EXTREMITY (HCC): ICD-10-CM

## 2017-12-27 DIAGNOSIS — L97.222 NON-PRESSURE CHRONIC ULCER OF LEFT CALF WITH FAT LAYER EXPOSED (HCC): ICD-10-CM

## 2017-12-27 PROCEDURE — 29580 STRAPPING UNNA BOOT: CPT

## 2017-12-27 ASSESSMENT — PAIN SCALES - GENERAL: PAINLEVEL_OUTOF10: 0

## 2017-12-27 NOTE — PLAN OF CARE
Problem: Wound:  Goal: Will show signs of wound healing; wound closure and no evidence of infection  Will show signs of wound healing; wound closure and no evidence of infection   Outcome: Met This Shift      Problem: Venous:  Goal: Signs of wound healing will improve  Signs of wound healing will improve   Outcome: Ongoing      Problem: Compression therapy:  Goal: Will be free from complications associated with compression therapy  Will be free from complications associated with compression therapy   Outcome: Ongoing

## 2018-01-03 ENCOUNTER — HOSPITAL ENCOUNTER (OUTPATIENT)
Dept: WOUND CARE | Age: 83
Discharge: HOME | End: 2018-01-03
Payer: MEDICARE

## 2018-01-03 VITALS
DIASTOLIC BLOOD PRESSURE: 95 MMHG | TEMPERATURE: 97.1 F | HEIGHT: 72 IN | HEART RATE: 80 BPM | RESPIRATION RATE: 16 BRPM | SYSTOLIC BLOOD PRESSURE: 160 MMHG | BODY MASS INDEX: 27.09 KG/M2 | WEIGHT: 200 LBS

## 2018-01-03 DIAGNOSIS — I87.312 CHRONIC VENOUS HYPERTENSION (IDIOPATHIC) WITH ULCER OF LEFT LOWER EXTREMITY (HCC): ICD-10-CM

## 2018-01-03 DIAGNOSIS — L97.929 CHRONIC VENOUS HYPERTENSION (IDIOPATHIC) WITH ULCER OF LEFT LOWER EXTREMITY (HCC): ICD-10-CM

## 2018-01-03 DIAGNOSIS — L97.222 NON-PRESSURE CHRONIC ULCER OF LEFT CALF WITH FAT LAYER EXPOSED (HCC): ICD-10-CM

## 2018-01-03 PROCEDURE — 99213 OFFICE O/P EST LOW 20 MIN: CPT | Performed by: SURGERY

## 2018-01-03 PROCEDURE — 99212 OFFICE O/P EST SF 10 MIN: CPT

## 2018-01-03 ASSESSMENT — PAIN SCALES - GENERAL: PAINLEVEL_OUTOF10: 0

## 2018-01-03 NOTE — PROGRESS NOTES
Maxium Distance (cm) 0 2/28/2017  8:00 AM   Wound Assessment Pink 3/28/2017  8:00 AM   Drainage Amount Moderate 3/14/2017  8:09 AM   Drainage Description Serosanguinous 3/14/2017  8:09 AM   Odor None 3/14/2017  8:09 AM   Margins Defined edges 2/28/2017  8:00 AM   Rima-wound Assessment Intact 3/28/2017  8:00 AM   Non-staged Wound Description Full thickness 3/28/2017  8:00 AM   [place]%Wound Bed 0 2/28/2017  8:00 AM   Red%Wound Bed 95 3/14/2017  8:09 AM   Yellow%Wound Bed 5 3/14/2017  8:09 AM   Black%Wound Bed 0 2/28/2017  8:00 AM   Purple%Wound Bed 0 2/28/2017  8:00 AM   Debridement per physician None 3/28/2017  8:00 AM   Time out N/A 3/28/2017  8:00 AM   Procedural Pain 0 2/21/2017  8:43 AM   Post procedural Pain 0 2/21/2017  8:43 AM   Number of days: 432       Wound 11/29/17 Venous ulcer Ankle Left; Lower;Medial #3 Venous Ulcer Left Lower Ext (Active)   Wound Image   12/27/2017  9:36 AM   Wound Type Wound 12/27/2017  9:36 AM   Wound Venous 12/27/2017  9:36 AM   Dressing Status Changed;Dry; Intact 12/27/2017  9:45 AM   Dressing Changed Changed/New 12/27/2017  9:45 AM   Wound Cleansed Other (Comment) 12/27/2017  9:36 AM   Wound Length (cm) 0 cm 12/27/2017  9:36 AM   Wound Width (cm) 0 cm 12/27/2017  9:36 AM   Wound Depth (cm)  0 12/27/2017  9:36 AM   Calculated Wound Size (cm^2) (l*w) 0 cm^2 12/27/2017  9:36 AM   Change in Wound Size % (l*w) 100 12/27/2017  9:36 AM   Distance Tunneling (cm) 0 cm 12/27/2017  9:36 AM   Tunneling Position ___ O'Clock 0 12/27/2017  9:36 AM   Undermining Starts ___ O'Clock 0 12/27/2017  9:36 AM   Undermining Ends___ O'Clock 0 12/27/2017  9:36 AM   Undermining Maxium Distance (cm) 0 12/27/2017  9:36 AM   Wound Assessment Epithelialization 12/27/2017  9:36 AM   Drainage Amount None 12/27/2017  9:36 AM   Drainage Description Serosanguinous 12/20/2017  9:03 AM   Odor None 12/27/2017  9:36 AM   Margins Attached edges 12/20/2017  9:03 AM   Rima-wound Assessment Hyperpigmented;Dry 12/27/2017  9:36 AM Non-staged Wound Description Not applicable 00/73/0678  3:20 AM   [employer]%Wound Bed 0 12/27/2017  9:36 AM   Red%Wound Bed 0 12/27/2017  9:36 AM   Yellow%Wound Bed 0 12/27/2017  9:36 AM   Black%Wound Bed 0 12/27/2017  9:36 AM   Purple%Wound Bed 0 12/27/2017  9:36 AM   Other%Wound Bed 0 12/27/2017  9:36 AM   Culture Taken No 12/27/2017  9:36 AM   Debridement per physician None 12/27/2017  9:36 AM   Time out N/A 12/27/2017  9:36 AM   Procedural Pain 0 12/13/2017  9:54 AM   Post procedural Pain 0 12/13/2017  9:54 AM   Number of days: 35       Wound 12/13/17 Abrasion(s) Foot Left; Anterior #4 Left Ant Foot Venous/Trauma (Active)   Wound Image   12/27/2017  9:36 AM   Wound Type Wound 12/27/2017  9:36 AM   Wound Traumatic 12/27/2017  9:36 AM   Dressing Status Changed; Intact;Dry;Clean 12/27/2017  9:45 AM   Dressing Changed Changed/New 12/27/2017  9:45 AM   Wound Cleansed Other (Comment) 12/27/2017  9:36 AM   Wound Length (cm) 0 cm 12/27/2017  9:36 AM   Wound Width (cm) 0 cm 12/27/2017  9:36 AM   Wound Depth (cm)  0 12/27/2017  9:36 AM   Calculated Wound Size (cm^2) (l*w) 0 cm^2 12/27/2017  9:36 AM   Change in Wound Size % (l*w) 100 12/27/2017  9:36 AM   Distance Tunneling (cm) 0 cm 12/27/2017  9:36 AM   Tunneling Position ___ O'Clock 0 12/27/2017  9:36 AM   Undermining Starts ___ O'Clock 0 12/27/2017  9:36 AM   Undermining Ends___ O'Clock 0 12/27/2017  9:36 AM   Undermining Maxium Distance (cm) 0 12/27/2017  9:36 AM   Wound Assessment Epithelialization 12/27/2017  9:36 AM   Drainage Amount None 12/27/2017  9:36 AM   Drainage Description Serosanguinous 12/20/2017  9:03 AM   Odor None 12/27/2017  9:36 AM   Rima-wound Assessment Dry;Hyperpigmented 12/27/2017  9:36 AM   Non-staged Wound Description Not applicable 86/20/6558  5:46 AM   [employer]%Wound Bed 0 12/27/2017  9:36 AM   Red%Wound Bed 0 12/27/2017  9:36 AM   Yellow%Wound Bed 0 12/27/2017  9:36 AM   Black%Wound Bed 0 12/27/2017  9:36 AM   Purple%Wound Bed 0 12/27/2017  9:36 AM

## 2018-01-09 ENCOUNTER — HOSPITAL ENCOUNTER (OUTPATIENT)
Dept: WOUND CARE | Age: 83
Discharge: HOME | End: 2018-01-09
Payer: MEDICARE

## 2018-01-09 VITALS
TEMPERATURE: 97 F | RESPIRATION RATE: 14 BRPM | SYSTOLIC BLOOD PRESSURE: 136 MMHG | HEART RATE: 68 BPM | WEIGHT: 200 LBS | BODY MASS INDEX: 27.09 KG/M2 | DIASTOLIC BLOOD PRESSURE: 91 MMHG | HEIGHT: 72 IN

## 2018-01-09 DIAGNOSIS — L97.222 NON-PRESSURE CHRONIC ULCER OF LEFT CALF WITH FAT LAYER EXPOSED (HCC): ICD-10-CM

## 2018-01-09 DIAGNOSIS — I87.312 CHRONIC VENOUS HYPERTENSION (IDIOPATHIC) WITH ULCER OF LEFT LOWER EXTREMITY (HCC): ICD-10-CM

## 2018-01-09 DIAGNOSIS — L97.929 CHRONIC VENOUS HYPERTENSION (IDIOPATHIC) WITH ULCER OF LEFT LOWER EXTREMITY (HCC): ICD-10-CM

## 2018-01-09 PROCEDURE — 99213 OFFICE O/P EST LOW 20 MIN: CPT | Performed by: SURGERY

## 2018-01-09 PROCEDURE — 99212 OFFICE O/P EST SF 10 MIN: CPT

## 2018-01-09 NOTE — PROGRESS NOTES
Non-staged Wound Description Not applicable 36/54/6420  9:64 AM   [place]%Wound Bed 0 12/27/2017  9:36 AM   Red%Wound Bed 0 12/27/2017  9:36 AM   Yellow%Wound Bed 0 12/27/2017  9:36 AM   Black%Wound Bed 0 12/27/2017  9:36 AM   Purple%Wound Bed 0 12/27/2017  9:36 AM   Other%Wound Bed 0 12/27/2017  9:36 AM   Culture Taken No 12/27/2017  9:36 AM   Debridement per physician None 12/27/2017  9:36 AM   Time out N/A 12/27/2017  9:36 AM   Procedural Pain 0 12/13/2017  9:54 AM   Post procedural Pain 0 12/13/2017  9:54 AM   Number of days: 41       Wound 12/13/17 Abrasion(s) Foot Left; Anterior #4 Left Ant Foot Venous/Trauma (Active)   Wound Image   12/27/2017  9:36 AM   Wound Type Wound 12/27/2017  9:36 AM   Wound Traumatic 12/27/2017  9:36 AM   Dressing Status Changed; Intact;Dry;Clean 12/27/2017  9:45 AM   Dressing Changed Changed/New 12/27/2017  9:45 AM   Wound Cleansed Other (Comment) 12/27/2017  9:36 AM   Wound Length (cm) 0 cm 12/27/2017  9:36 AM   Wound Width (cm) 0 cm 12/27/2017  9:36 AM   Wound Depth (cm)  0 12/27/2017  9:36 AM   Calculated Wound Size (cm^2) (l*w) 0 cm^2 12/27/2017  9:36 AM   Change in Wound Size % (l*w) 100 12/27/2017  9:36 AM   Distance Tunneling (cm) 0 cm 12/27/2017  9:36 AM   Tunneling Position ___ O'Clock 0 12/27/2017  9:36 AM   Undermining Starts ___ O'Clock 0 12/27/2017  9:36 AM   Undermining Ends___ O'Clock 0 12/27/2017  9:36 AM   Undermining Maxium Distance (cm) 0 12/27/2017  9:36 AM   Wound Assessment Epithelialization 12/27/2017  9:36 AM   Drainage Amount None 12/27/2017  9:36 AM   Drainage Description Serosanguinous 12/20/2017  9:03 AM   Odor None 12/27/2017  9:36 AM   Rima-wound Assessment Dry;Hyperpigmented 12/27/2017  9:36 AM   Non-staged Wound Description Not applicable 07/75/7975  2:66 AM   [place]%Wound Bed 0 12/27/2017  9:36 AM   Red%Wound Bed 0 12/27/2017  9:36 AM   Yellow%Wound Bed 0 12/27/2017  9:36 AM   Black%Wound Bed 0 12/27/2017  9:36 AM   Purple%Wound Bed 0 12/27/2017  9:36 AM

## 2018-01-19 ENCOUNTER — HOSPITAL ENCOUNTER (OUTPATIENT)
Dept: ULTRASOUND IMAGING | Facility: HOSPITAL | Age: 83
Discharge: HOME | End: 2018-01-19
Attending: INTERNAL MEDICINE | Admitting: INTERNAL MEDICINE

## 2018-01-19 ENCOUNTER — TRANSCRIBE ORDERS (OUTPATIENT)
Dept: ADMINISTRATIVE | Facility: HOSPITAL | Age: 83
End: 2018-01-19

## 2018-01-19 DIAGNOSIS — I73.9 INTERMITTENT CLAUDICATION (HCC): Primary | ICD-10-CM

## 2018-01-19 DIAGNOSIS — I73.9 INTERMITTENT CLAUDICATION (HCC): ICD-10-CM

## 2018-01-19 PROCEDURE — 93924 LWR XTR VASC STDY BILAT: CPT | Performed by: SURGERY

## 2018-01-19 PROCEDURE — 93923 UPR/LXTR ART STDY 3+ LVLS: CPT

## 2018-02-01 ENCOUNTER — PRE-PROCEDURE TELEPHONE (OUTPATIENT)
Dept: WOUND CARE | Age: 83
End: 2018-02-01

## 2018-02-01 DIAGNOSIS — I87.312 CHRONIC VENOUS HYPERTENSION (IDIOPATHIC) WITH ULCER OF LEFT LOWER EXTREMITY (HCC): ICD-10-CM

## 2018-02-01 DIAGNOSIS — L97.929 CHRONIC VENOUS HYPERTENSION (IDIOPATHIC) WITH ULCER OF LEFT LOWER EXTREMITY (HCC): ICD-10-CM

## 2018-02-01 DIAGNOSIS — L97.222 NON-PRESSURE CHRONIC ULCER OF LEFT CALF WITH FAT LAYER EXPOSED (HCC): ICD-10-CM

## 2018-07-26 ENCOUNTER — HOSPITAL ENCOUNTER (OUTPATIENT)
Dept: ULTRASOUND IMAGING | Facility: HOSPITAL | Age: 83
Discharge: HOME | End: 2018-07-26
Admitting: NURSE PRACTITIONER

## 2018-07-26 ENCOUNTER — OFFICE VISIT (OUTPATIENT)
Dept: VASCULAR SURGERY | Facility: CLINIC | Age: 83
End: 2018-07-26

## 2018-07-26 VITALS
BODY MASS INDEX: 28.99 KG/M2 | HEIGHT: 72 IN | SYSTOLIC BLOOD PRESSURE: 130 MMHG | HEART RATE: 83 BPM | OXYGEN SATURATION: 99 % | WEIGHT: 214 LBS | DIASTOLIC BLOOD PRESSURE: 76 MMHG

## 2018-07-26 DIAGNOSIS — I10 ESSENTIAL HYPERTENSION: ICD-10-CM

## 2018-07-26 DIAGNOSIS — I73.9 PAD (PERIPHERAL ARTERY DISEASE) (HCC): Primary | ICD-10-CM

## 2018-07-26 DIAGNOSIS — I65.23 BILATERAL CAROTID ARTERY STENOSIS: ICD-10-CM

## 2018-07-26 DIAGNOSIS — I73.9 PAD (PERIPHERAL ARTERY DISEASE) (HCC): ICD-10-CM

## 2018-07-26 DIAGNOSIS — I83.893 VARICOSE VEINS OF BOTH LEGS WITH EDEMA: ICD-10-CM

## 2018-07-26 PROCEDURE — 99213 OFFICE O/P EST LOW 20 MIN: CPT | Performed by: NURSE PRACTITIONER

## 2018-07-26 PROCEDURE — 93923 UPR/LXTR ART STDY 3+ LVLS: CPT

## 2018-07-26 PROCEDURE — 93924 LWR XTR VASC STDY BILAT: CPT | Performed by: SURGERY

## 2018-07-26 NOTE — PROGRESS NOTES
"07/26/2018     Floyd Peres MD   [address]   [place] [address]      Sangita Viramontes  1/5/1933    Chief Complaint   Patient presents with   • Follow-up     1 Year Follow Up. Test 07/26/2018 US pad ankle / brach ind ext comp. Patient denies any stroke like symptoms.        Dear Floyd Peres MD       HPI  I had the pleasure of seeing your patient Sangita Viramontes in the office today.    As you recall, Sangita Viramontes is a 85 y.o.  male who you have recently seen.  He states he was seen for \"fluid buildup in his legs\".  He seems to be referring to varicose veins to his right lower extremity.  He has evidence of stasis dermatitis and swelling to bilateral lower extremities.  He denies any cramping or aching of his legs.  He denies any claudicating symptoms to his lower extremities. He has been wrapping his legs with ACE wraps, as he has trouble getting stockings on.  He did have noninvasive testing performed today, which I did review in office.        Review of Systems   Constitutional: Negative.    HENT: Negative.    Eyes: Negative.    Respiratory: Negative.    Cardiovascular: Positive for leg swelling (varicose veins to bialteral lower extremities, right greater than left).   Gastrointestinal: Negative.    Endocrine: Negative.    Genitourinary: Negative.    Musculoskeletal: Negative.    Skin: Positive for color change.   Allergic/Immunologic: Negative.    Neurological: Negative.    Hematological: Negative.    Psychiatric/Behavioral: Negative.    All other systems reviewed and are negative.      /76 (BP Location: Right arm, Patient Position: Sitting, Cuff Size: Adult)   Pulse 83   Ht 182.9 cm (72\")   Wt 97.1 kg (214 lb)   SpO2 99%   BMI 29.02 kg/m²   Physical Exam   Constitutional: He is oriented to person, place, and time. He appears well-developed and well-nourished.   HENT:   Head: Normocephalic and atraumatic.   Eyes: Pupils are equal, round, and reactive to light. No scleral " icterus.   Neck: Normal range of motion. Neck supple. No thyromegaly present.   Cardiovascular: Normal rate, regular rhythm and normal heart sounds.    Doppler signals  Varicose veins to bilateral lower extremities, right greater than left with a large varicosity to calf  Stasis dermatitis to bilateral lower extremtiies   Pulmonary/Chest: Effort normal and breath sounds normal.   Abdominal: Soft. Bowel sounds are normal.   Musculoskeletal: Normal range of motion.   Neurological: He is alert and oriented to person, place, and time.   Skin: Skin is warm and dry.   Psychiatric: He has a normal mood and affect. His behavior is normal. Judgment and thought content normal.   Nursing note and vitals reviewed.      No results found.    Patient Active Problem List   Diagnosis   • Hypertension   • Varicose veins of both legs with edema   • Paroxysmal atrial fibrillation (CMS/[facility])   • PAD (peripheral artery disease) (CMS/[facility])         ICD-10-CM ICD-9-CM   1. PAD (peripheral artery disease) (CMS/[facility]) I73.9 443.9   2. Bilateral carotid artery stenosis I65.23 433.10     433.30   3. Varicose veins of both legs with edema I83.893 454.8   4. Essential hypertension I10 401.9       Plan: After thoroughly evaluating Sangita Viramontes, I believe the best course of action is to remain conservative from a vascular standpoint.  He denies any claudicating symptoms.  He has been wrapping his legs with ACE wraps, which is keeping his swelling at bay.  He does have severe arterial disease, however is not having any claudicating symptoms.  I did recommend he begin taking an aspirin EC 81 mg.  We will see him back in 1 years with repeat noninvasive testing for continued surveillance, including ABIs and carotid duplex.   The patient can continue taking her current medication regimen as previously planned.  This was all discussed in full with complete understanding.    Thank you for allowing me to participate in the care of your patient.  Please do  not hesitate with any questions or concerns.  I will keep you aware of any further encounters with Sangita Viramontes.        Sincerely yours,         CRYSTAL Lynn MD

## 2018-12-17 ENCOUNTER — OFFICE VISIT (OUTPATIENT)
Dept: PODIATRY | Facility: CLINIC | Age: 83
End: 2018-12-17

## 2018-12-17 VITALS
OXYGEN SATURATION: 96 % | DIASTOLIC BLOOD PRESSURE: 74 MMHG | SYSTOLIC BLOOD PRESSURE: 138 MMHG | BODY MASS INDEX: 28.99 KG/M2 | WEIGHT: 214 LBS | HEART RATE: 88 BPM | HEIGHT: 72 IN

## 2018-12-17 DIAGNOSIS — Z79.01 ANTICOAGULANT LONG-TERM USE: ICD-10-CM

## 2018-12-17 DIAGNOSIS — I73.9 PAD (PERIPHERAL ARTERY DISEASE) (HCC): ICD-10-CM

## 2018-12-17 DIAGNOSIS — B35.1 ONYCHOMYCOSIS: Primary | ICD-10-CM

## 2018-12-17 DIAGNOSIS — I87.2 VENOUS INSUFFICIENCY (CHRONIC) (PERIPHERAL): ICD-10-CM

## 2018-12-17 PROBLEM — Z79.02 ANTIPLATELET OR ANTITHROMBOTIC LONG-TERM USE: Status: ACTIVE | Noted: 2018-12-17

## 2018-12-17 PROCEDURE — 99203 OFFICE O/P NEW LOW 30 MIN: CPT | Performed by: PODIATRIST

## 2018-12-17 PROCEDURE — 11056 PARNG/CUTG B9 HYPRKR LES 2-4: CPT | Performed by: PODIATRIST

## 2018-12-17 PROCEDURE — 11721 DEBRIDE NAIL 6 OR MORE: CPT | Performed by: PODIATRIST

## 2018-12-17 RX ORDER — DABIGATRAN ETEXILATE 150 MG/1
150 CAPSULE ORAL 2 TIMES DAILY
COMMUNITY
End: 2019-01-10

## 2018-12-17 RX ORDER — LISINOPRIL AND HYDROCHLOROTHIAZIDE 20; 12.5 MG/1; MG/1
1 TABLET ORAL DAILY
Status: ON HOLD | COMMUNITY
End: 2019-06-02

## 2018-12-17 NOTE — PROGRESS NOTES
[facility] - PODIATRY    Today's Date: 12/17/18    Patient Name: Sangita Viramontes  MRN: [id number]  CSN: [id number]  PCP: Floyd Peres MD  Referring Provider: Gene Osborne MD    SUBJECTIVE     Chief Complaint   Patient presents with   • Establish Care     Patient is here to establish care. Patient c/o discoloring in feet/toes and thickened nails. Patient states his feet are turning black. Patient denies pain at present time. PCP: Dr. Floyd Peres 12/04/2018     HPI: Sangita Viramontes, a 85 y.o.male, comes to clinic as a(n) new patient complaining of thickened toenails and peripherl vascular disease. Patient has h/o AFib, HTN, PAD, Sleep Apnea, Varicose Veins. Patient is non-diabetic. States that his toenails are long, thick, and crumbly. He is unable to care for them himself adequately. Denies open wounds or sores. Denies numbness to feet. Takes Warfarin. Admits to swelling in legs which he wraps with ACE wraps. He is established with Dr. Orr. He is concerned for darkening of the skin to his lower legs and feet. Denies pain. Relates previous treatment(s) including foot care by podiatrist. Denies any constitutional symptoms. No other pedal complaints at this time.    Past Medical History:   Diagnosis Date   • A-fib (CMS/HCC)    • Hypertension    • PAD (peripheral artery disease) (CMS/[facility])    • Sleep apnea    • Varicose vein of leg      Past Surgical History:   Procedure Laterality Date   • CATARACT EXTRACTION Bilateral    • CHOLECYSTECTOMY     • LEG SURGERY     • TOTAL KNEE ARTHROPLASTY Left    • TOTAL KNEE ARTHROPLASTY Right      Family History   Problem Relation Age of Onset   • Heart attack Mother    • Heart failure Father      Social History     Socioeconomic History   • Marital status:      Spouse name: Not on file   • Number of children: Not on file   • Years of education: Not on file   • Highest education level: Not on file   Social Needs   • Financial resource strain:  Not on file   • Food insecurity - worry: Not on file   • Food insecurity - inability: Not on file   • Transportation needs - medical: Not on file   • Transportation needs - non-medical: Not on file   Occupational History   • Not on file   Tobacco Use   • Smoking status: Former Smoker   • Smokeless tobacco: Former User     Types: Chew   Substance and Sexual Activity   • Alcohol use: No   • Drug use: No   • Sexual activity: Not on file   Other Topics Concern   • Not on file   Social History Narrative   • Not on file     Allergies   Allergen Reactions   • Other Rash     PT STATES PLASTIC GLOVES BROKE HIS HANDS OUT, HE DENIES REACTION TO TAPE OR BANDAIDS     Current Outpatient Medications   Medication Sig Dispense Refill   • dabigatran etexilate (PRADAXA) 150 MG capsu Take 150 mg by mouth 2 (Two) Times a Day.     • Glucosamine-Chondroit-Vit C-Mn (GLUCOSAMINE 1500 COMPLEX PO) Take  by mouth.     • lisinopril-hydrochlorothiazide (PRINZIDE,ZESTORETIC) 20-12.5 MG per tablet Take 1 tablet by mouth Daily.     • Multiple Vitamins-Minerals (VISION-JUVENTINO PRESERVE PO) Take  by mouth.     • Omega-3 Fatty Acids (FISH OIL) 1000 MG capsule capsule Take 1,000 mg by mouth Daily With Breakfast.     • vitamin B-12 (CYANOCOBALAMIN) 100 MCG tablet Take 50 mcg by mouth Daily.     • warfarin (COUMADIN) 5 MG tablet Take 5 mg by mouth Every Evening.  5   • FUROSEMIDE PO Take  by mouth Daily.     • irbesartan (AVAPRO) 150 MG tablet        No current facility-administered medications for this visit.      Review of Systems   Constitutional: Negative for chills and fever.   HENT: Negative for congestion.    Respiratory: Negative for shortness of breath.    Cardiovascular: Positive for leg swelling. Negative for chest pain.   Gastrointestinal: Negative for constipation, diarrhea, nausea and vomiting.   Musculoskeletal: Positive for arthralgias.        Foot pain   Skin: Positive for color change. Negative for wound.   Neurological: Negative for  numbness.       OBJECTIVE     Vitals:    12/17/18 1452   BP: 138/74   Pulse: 88   SpO2: 96%       PHYSICAL EXAM  GEN:   Accompanied by son.     General Exam  Appearance: appears stated age and healthy   Orientation: alert and oriented to person, place, and time   Affect: appropriate   Gait: unimpaired   Assistance: independent   Shoe Gear: casual shoes      Foot/Ankle Exam  Inspection and Palpation  Ecchymosis - Right: none Left: none  Tenderness - Right: (Toenails) Left: (Toenails)  Arch - Right: normal Left: normal  Hammertoes - Right: absent   Claw Toes - Right: absent   Mallet Toes - Right: absent Left: absent  Hallux Valgus - Right: no Left: no  Hallux Limitus - Right: no Left: no  Edema - Right: +1 Left: +1  Skin - Right: dry skin (Mildly atrophic and scaling.) Left: callus and dry skin (Mildly atrophic and scaling.)  Nails -  Right: abnormally thick and onychomycosis Left: abnormally thick and onychomycosis     Vascular  Dorsalis Pedis - Right: 2+ Left: 2+  Posterior Tibial - Right: 2+ Left: 2+  Skin Temperature -  Right: warm  Left: warm    CFT - Right: 3 Left: 3  Pedal Hair Growth - Right: absent Left: absent  Varicosities -  Right: severe varicosities  Left: severe varicosities      Neurologic  Saphenous Nerve Sensation  - Right: normal Left: normal  Tibial Nerve Sensation - Right: normal Left: normal  Superficial Peroneal Nerve Sensation - Right: normal Left: normal  Deep Peroneal Nerve Sensation - Right: normal Left: normal  Sural Nerve Sensation - Right: normal Left: normal  Protective Sensation using Ayr-Ashley Monofilament - Right: 10 Left: 10    Muscle Strength  Dorsiflexion - Right: 5 Left: 5  Plantar Flexion - Right: 5 Left: 5  Eversion - Right: 5 Left: 5  Inversion - Right: 5 Left: 5  Great Toe Extension - Right: 5 Left: 5  Great Toe Flexion - Right: 5 Left: 5    Range of Motion  Normal right ankle ROM  Normal left ankle ROM            RADIOLOGY/NUCLEAR:  No results  found.    LABORATORY/CULTURE RESULTS:      PATHOLOGY RESULTS:       ASSESSMENT/PLAN     Sangita was seen today for establish care.    Diagnoses and all orders for this visit:    Onychomycosis    PAD (peripheral artery disease) (CMS/HCC)    Anticoagulant long-term use    Venous insufficiency (chronic) (peripheral)      Comprehensive lower extremity examination and evaluation was performed.  Discussed findings and treatment plan including risks, benefits, and treatment options with patient in detail. Patient agreed with treatment plan.  After verbal consent obtained, nail(s) x10 debrided of length and thickness with nail nipper without incidence  After verbal consent obtained, calluses x2 pared utilizing dermal curette and/or scalpel without incidence  Patient may maintain nails and calluses at home utilizing emery board or pumice stone between visits as needed   Continue use of compression to legs   Darkening of skin is hemosiderin deposits and not indicative of ischemia  An After Visit Summary was printed and given to the patient at discharge, including (if requested) any available informative/educational handouts regarding diagnosis, treatment, or medications. All questions were answered to patient/family satisfaction. Should symptoms fail to improve or worsen they agree to call or return to clinic or to go to the Emergency Department. Discussed the importance of following up with any needed screening tests/labs/specialist appointments and any requested follow-up recommended by me today. Importance of maintaining follow-up discussed and patient accepts that missed appointments can delay diagnosis and potentially lead to worsening of conditions.  Return in about 3 months (around 3/17/2019)., or sooner if acute issues arise.    Lab Frequency Next Occurrence   US Ankle / Brachial Indices Extremity Complete Once 07/26/2019   US Carotid Bilateral Once 07/26/2019       This document has been electronically signed by  Peipto Brizuela DPM on December 17, 2018 3:17 PM

## 2019-01-10 ENCOUNTER — OFFICE VISIT (OUTPATIENT)
Dept: CARDIOLOGY | Facility: CLINIC | Age: 84
End: 2019-01-10

## 2019-01-10 VITALS
BODY MASS INDEX: 28.04 KG/M2 | WEIGHT: 207 LBS | DIASTOLIC BLOOD PRESSURE: 79 MMHG | HEIGHT: 72 IN | HEART RATE: 81 BPM | SYSTOLIC BLOOD PRESSURE: 110 MMHG

## 2019-01-10 DIAGNOSIS — I10 ESSENTIAL HYPERTENSION: ICD-10-CM

## 2019-01-10 DIAGNOSIS — I73.9 PAD (PERIPHERAL ARTERY DISEASE) (HCC): ICD-10-CM

## 2019-01-10 DIAGNOSIS — I48.0 PAROXYSMAL ATRIAL FIBRILLATION (HCC): Primary | ICD-10-CM

## 2019-01-10 DIAGNOSIS — Z79.01 ANTICOAGULANT LONG-TERM USE: ICD-10-CM

## 2019-01-10 PROCEDURE — 93000 ELECTROCARDIOGRAM COMPLETE: CPT | Performed by: INTERNAL MEDICINE

## 2019-01-10 PROCEDURE — 99214 OFFICE O/P EST MOD 30 MIN: CPT | Performed by: INTERNAL MEDICINE

## 2019-02-22 ENCOUNTER — APPOINTMENT (OUTPATIENT)
Dept: CT IMAGING | Age: 84
DRG: 470 | End: 2019-02-22
Payer: MEDICARE

## 2019-02-22 ENCOUNTER — HOSPITAL ENCOUNTER (INPATIENT)
Age: 84
LOS: 4 days | Discharge: NURSING, REHAB OR LONG-TERM CARE | DRG: 470 | End: 2019-02-26
Attending: INTERNAL MEDICINE | Admitting: INTERNAL MEDICINE
Payer: MEDICARE

## 2019-02-22 ENCOUNTER — APPOINTMENT (OUTPATIENT)
Dept: GENERAL RADIOLOGY | Age: 84
DRG: 470 | End: 2019-02-22
Payer: MEDICARE

## 2019-02-22 DIAGNOSIS — M84.353A FEMORAL NECK STRESS FRACTURE, INITIAL ENCOUNTER: ICD-10-CM

## 2019-02-22 DIAGNOSIS — S72.002A LEFT DISPLACED FEMORAL NECK FRACTURE (HCC): Primary | ICD-10-CM

## 2019-02-22 LAB
ABO/RH: NORMAL
ALBUMIN SERPL-MCNC: 3.8 G/DL (ref 3.5–5.2)
ALP BLD-CCNC: 150 U/L (ref 40–130)
ALT SERPL-CCNC: 42 U/L (ref 5–41)
ANION GAP SERPL CALCULATED.3IONS-SCNC: 13 MMOL/L (ref 7–19)
ANTIBODY SCREEN: NORMAL
APTT: 32.9 SEC (ref 26–36.2)
AST SERPL-CCNC: 43 U/L (ref 5–40)
BASOPHILS ABSOLUTE: 0 K/UL (ref 0–0.2)
BASOPHILS RELATIVE PERCENT: 0.3 % (ref 0–1)
BILIRUB SERPL-MCNC: 1.7 MG/DL (ref 0.2–1.2)
BUN BLDV-MCNC: 14 MG/DL (ref 8–23)
CALCIUM SERPL-MCNC: 9.1 MG/DL (ref 8.8–10.2)
CHLORIDE BLD-SCNC: 99 MMOL/L (ref 98–111)
CO2: 27 MMOL/L (ref 22–29)
CREAT SERPL-MCNC: 0.9 MG/DL (ref 0.5–1.2)
EOSINOPHILS ABSOLUTE: 0 K/UL (ref 0–0.6)
EOSINOPHILS RELATIVE PERCENT: 0.3 % (ref 0–5)
GFR NON-AFRICAN AMERICAN: >60
GLUCOSE BLD-MCNC: 134 MG/DL (ref 74–109)
HCT VFR BLD CALC: 53.1 % (ref 42–52)
HEMOGLOBIN: 17.2 G/DL (ref 14–18)
INR BLD: 1.68 (ref 0.88–1.18)
LYMPHOCYTES ABSOLUTE: 0.7 K/UL (ref 1.1–4.5)
LYMPHOCYTES RELATIVE PERCENT: 7.4 % (ref 20–40)
MCH RBC QN AUTO: 28.5 PG (ref 27–31)
MCHC RBC AUTO-ENTMCNC: 32.4 G/DL (ref 33–37)
MCV RBC AUTO: 87.9 FL (ref 80–94)
MONOCYTES ABSOLUTE: 0.8 K/UL (ref 0–0.9)
MONOCYTES RELATIVE PERCENT: 8.2 % (ref 0–10)
NEUTROPHILS ABSOLUTE: 7.8 K/UL (ref 1.5–7.5)
NEUTROPHILS RELATIVE PERCENT: 83 % (ref 50–65)
PDW BLD-RTO: 13.8 % (ref 11.5–14.5)
PLATELET # BLD: 148 K/UL (ref 130–400)
PMV BLD AUTO: 10.7 FL (ref 9.4–12.4)
POTASSIUM SERPL-SCNC: 3.4 MMOL/L (ref 3.5–5)
PROTHROMBIN TIME: 19.1 SEC (ref 12–14.6)
RBC # BLD: 6.04 M/UL (ref 4.7–6.1)
SODIUM BLD-SCNC: 139 MMOL/L (ref 136–145)
TOTAL PROTEIN: 6.8 G/DL (ref 6.6–8.7)
WBC # BLD: 9.4 K/UL (ref 4.8–10.8)

## 2019-02-22 PROCEDURE — 99285 EMERGENCY DEPT VISIT HI MDM: CPT

## 2019-02-22 PROCEDURE — 86850 RBC ANTIBODY SCREEN: CPT

## 2019-02-22 PROCEDURE — 85025 COMPLETE CBC W/AUTO DIFF WBC: CPT

## 2019-02-22 PROCEDURE — 6360000002 HC RX W HCPCS: Performed by: INTERNAL MEDICINE

## 2019-02-22 PROCEDURE — 71045 X-RAY EXAM CHEST 1 VIEW: CPT

## 2019-02-22 PROCEDURE — 73552 X-RAY EXAM OF FEMUR 2/>: CPT

## 2019-02-22 PROCEDURE — 86901 BLOOD TYPING SEROLOGIC RH(D): CPT

## 2019-02-22 PROCEDURE — 86900 BLOOD TYPING SEROLOGIC ABO: CPT

## 2019-02-22 PROCEDURE — 2580000003 HC RX 258: Performed by: INTERNAL MEDICINE

## 2019-02-22 PROCEDURE — 85730 THROMBOPLASTIN TIME PARTIAL: CPT

## 2019-02-22 PROCEDURE — 96374 THER/PROPH/DIAG INJ IV PUSH: CPT

## 2019-02-22 PROCEDURE — 85610 PROTHROMBIN TIME: CPT

## 2019-02-22 PROCEDURE — 93005 ELECTROCARDIOGRAM TRACING: CPT

## 2019-02-22 PROCEDURE — 73560 X-RAY EXAM OF KNEE 1 OR 2: CPT

## 2019-02-22 PROCEDURE — 73200 CT UPPER EXTREMITY W/O DYE: CPT

## 2019-02-22 PROCEDURE — 1210000000 HC MED SURG R&B

## 2019-02-22 PROCEDURE — 96375 TX/PRO/DX INJ NEW DRUG ADDON: CPT

## 2019-02-22 PROCEDURE — 80053 COMPREHEN METABOLIC PANEL: CPT

## 2019-02-22 PROCEDURE — 6360000002 HC RX W HCPCS: Performed by: PHYSICIAN ASSISTANT

## 2019-02-22 PROCEDURE — 72125 CT NECK SPINE W/O DYE: CPT

## 2019-02-22 PROCEDURE — 73080 X-RAY EXAM OF ELBOW: CPT

## 2019-02-22 PROCEDURE — 36415 COLL VENOUS BLD VENIPUNCTURE: CPT

## 2019-02-22 PROCEDURE — 70450 CT HEAD/BRAIN W/O DYE: CPT

## 2019-02-22 RX ORDER — MORPHINE SULFATE 2 MG/ML
4 INJECTION, SOLUTION INTRAMUSCULAR; INTRAVENOUS
Status: DISCONTINUED | OUTPATIENT
Start: 2019-02-22 | End: 2019-02-25

## 2019-02-22 RX ORDER — HYDROCHLOROTHIAZIDE 12.5 MG/1
12.5 CAPSULE, GELATIN COATED ORAL DAILY
Status: DISCONTINUED | OUTPATIENT
Start: 2019-02-22 | End: 2019-02-26 | Stop reason: HOSPADM

## 2019-02-22 RX ORDER — ACETAMINOPHEN 500 MG
500 TABLET ORAL NIGHTLY PRN
Status: DISCONTINUED | OUTPATIENT
Start: 2019-02-22 | End: 2019-02-26 | Stop reason: HOSPADM

## 2019-02-22 RX ORDER — WARFARIN SODIUM 2 MG/1
5 TABLET ORAL
COMMUNITY

## 2019-02-22 RX ORDER — SODIUM CHLORIDE 0.9 % (FLUSH) 0.9 %
10 SYRINGE (ML) INJECTION PRN
Status: DISCONTINUED | OUTPATIENT
Start: 2019-02-22 | End: 2019-02-26 | Stop reason: HOSPADM

## 2019-02-22 RX ORDER — ACETAMINOPHEN 500 MG
500 TABLET ORAL EVERY 6 HOURS PRN
Status: DISCONTINUED | OUTPATIENT
Start: 2019-02-22 | End: 2019-02-26 | Stop reason: HOSPADM

## 2019-02-22 RX ORDER — ONDANSETRON 2 MG/ML
4 INJECTION INTRAMUSCULAR; INTRAVENOUS EVERY 6 HOURS PRN
Status: DISCONTINUED | OUTPATIENT
Start: 2019-02-22 | End: 2019-02-26 | Stop reason: HOSPADM

## 2019-02-22 RX ORDER — DIPHENHYDRAMINE HCL 25 MG
25 TABLET ORAL NIGHTLY PRN
Status: DISCONTINUED | OUTPATIENT
Start: 2019-02-22 | End: 2019-02-26 | Stop reason: HOSPADM

## 2019-02-22 RX ORDER — ONDANSETRON 2 MG/ML
4 INJECTION INTRAMUSCULAR; INTRAVENOUS EVERY 4 HOURS PRN
Status: DISCONTINUED | OUTPATIENT
Start: 2019-02-22 | End: 2019-02-22 | Stop reason: SDUPTHER

## 2019-02-22 RX ORDER — SODIUM CHLORIDE 0.9 % (FLUSH) 0.9 %
10 SYRINGE (ML) INJECTION EVERY 12 HOURS SCHEDULED
Status: DISCONTINUED | OUTPATIENT
Start: 2019-02-22 | End: 2019-02-26 | Stop reason: HOSPADM

## 2019-02-22 RX ORDER — ACETAMINOPHEN,DIPHENHYDRAMINE HCL 500; 25 MG/1; MG/1
1 TABLET, FILM COATED ORAL NIGHTLY PRN
Status: DISCONTINUED | OUTPATIENT
Start: 2019-02-22 | End: 2019-02-22 | Stop reason: SDUPTHER

## 2019-02-22 RX ORDER — MORPHINE SULFATE 10 MG/ML
6 INJECTION, SOLUTION INTRAMUSCULAR; INTRAVENOUS ONCE
Status: COMPLETED | OUTPATIENT
Start: 2019-02-22 | End: 2019-02-22

## 2019-02-22 RX ORDER — HYDROCHLOROTHIAZIDE 12.5 MG/1
TABLET ORAL DAILY
COMMUNITY

## 2019-02-22 RX ORDER — OXYCODONE HYDROCHLORIDE AND ACETAMINOPHEN 5; 325 MG/1; MG/1
1 TABLET ORAL EVERY 4 HOURS PRN
Status: DISCONTINUED | OUTPATIENT
Start: 2019-02-22 | End: 2019-02-25

## 2019-02-22 RX ADMIN — HYDROMORPHONE HYDROCHLORIDE 0.5 MG: 1 INJECTION, SOLUTION INTRAMUSCULAR; INTRAVENOUS; SUBCUTANEOUS at 14:36

## 2019-02-22 RX ADMIN — MORPHINE SULFATE 4 MG: 2 INJECTION, SOLUTION INTRAMUSCULAR; INTRAVENOUS at 22:12

## 2019-02-22 RX ADMIN — Medication 10 ML: at 21:54

## 2019-02-22 RX ADMIN — MORPHINE SULFATE 6 MG: 10 INJECTION INTRAVENOUS at 17:45

## 2019-02-22 ASSESSMENT — ENCOUNTER SYMPTOMS
COLOR CHANGE: 0
VOMITING: 0
SHORTNESS OF BREATH: 0
COUGH: 0
ABDOMINAL PAIN: 0
APNEA: 0
BACK PAIN: 0
CONSTIPATION: 0
DIARRHEA: 0

## 2019-02-22 ASSESSMENT — PAIN DESCRIPTION - LOCATION: LOCATION: ELBOW;KNEE

## 2019-02-22 ASSESSMENT — PAIN SCALES - GENERAL
PAINLEVEL_OUTOF10: 0
PAINLEVEL_OUTOF10: 3
PAINLEVEL_OUTOF10: 2
PAINLEVEL_OUTOF10: 6

## 2019-02-22 ASSESSMENT — PAIN DESCRIPTION - ORIENTATION: ORIENTATION: LEFT

## 2019-02-23 ENCOUNTER — APPOINTMENT (OUTPATIENT)
Dept: GENERAL RADIOLOGY | Age: 84
DRG: 470 | End: 2019-02-23
Payer: MEDICARE

## 2019-02-23 ENCOUNTER — ANESTHESIA (OUTPATIENT)
Dept: OPERATING ROOM | Age: 84
DRG: 470 | End: 2019-02-23
Payer: MEDICARE

## 2019-02-23 ENCOUNTER — ANESTHESIA EVENT (OUTPATIENT)
Dept: OPERATING ROOM | Age: 84
DRG: 470 | End: 2019-02-23
Payer: MEDICARE

## 2019-02-23 VITALS
DIASTOLIC BLOOD PRESSURE: 78 MMHG | SYSTOLIC BLOOD PRESSURE: 101 MMHG | OXYGEN SATURATION: 99 % | TEMPERATURE: 98 F | RESPIRATION RATE: 13 BRPM

## 2019-02-23 LAB
FOLATE: 16.5 NG/ML (ref 4.5–32.2)
HCT VFR BLD CALC: 49.2 % (ref 42–52)
HEMOGLOBIN: 15.8 G/DL (ref 14–18)
INR BLD: 1.69 (ref 0.88–1.18)
INR BLD: 1.72 (ref 0.88–1.18)
IRON SATURATION: 17 % (ref 14–50)
IRON: 30 UG/DL (ref 59–158)
MCH RBC QN AUTO: 28.4 PG (ref 27–31)
MCHC RBC AUTO-ENTMCNC: 32.1 G/DL (ref 33–37)
MCV RBC AUTO: 88.3 FL (ref 80–94)
PDW BLD-RTO: 14 % (ref 11.5–14.5)
PLATELET # BLD: 138 K/UL (ref 130–400)
PMV BLD AUTO: 10.8 FL (ref 9.4–12.4)
PROTHROMBIN TIME: 19.2 SEC (ref 12–14.6)
PROTHROMBIN TIME: 19.4 SEC (ref 12–14.6)
RBC # BLD: 5.57 M/UL (ref 4.7–6.1)
TOTAL IRON BINDING CAPACITY: 181 UG/DL (ref 250–400)
VITAMIN B-12: 1192 PG/ML (ref 211–946)
VITAMIN D 25-HYDROXY: 19.1 NG/ML
WBC # BLD: 9 K/UL (ref 4.8–10.8)

## 2019-02-23 PROCEDURE — 6360000002 HC RX W HCPCS: Performed by: NURSE ANESTHETIST, CERTIFIED REGISTERED

## 2019-02-23 PROCEDURE — 73501 X-RAY EXAM HIP UNI 1 VIEW: CPT

## 2019-02-23 PROCEDURE — 6360000002 HC RX W HCPCS: Performed by: INTERNAL MEDICINE

## 2019-02-23 PROCEDURE — 82746 ASSAY OF FOLIC ACID SERUM: CPT

## 2019-02-23 PROCEDURE — C1776 JOINT DEVICE (IMPLANTABLE): HCPCS | Performed by: ORTHOPAEDIC SURGERY

## 2019-02-23 PROCEDURE — 1210000000 HC MED SURG R&B

## 2019-02-23 PROCEDURE — 36415 COLL VENOUS BLD VENIPUNCTURE: CPT

## 2019-02-23 PROCEDURE — 3600000005 HC SURGERY LEVEL 5 BASE: Performed by: ORTHOPAEDIC SURGERY

## 2019-02-23 PROCEDURE — 3600000015 HC SURGERY LEVEL 5 ADDTL 15MIN: Performed by: ORTHOPAEDIC SURGERY

## 2019-02-23 PROCEDURE — 7100000000 HC PACU RECOVERY - FIRST 15 MIN: Performed by: ORTHOPAEDIC SURGERY

## 2019-02-23 PROCEDURE — 7100000001 HC PACU RECOVERY - ADDTL 15 MIN: Performed by: ORTHOPAEDIC SURGERY

## 2019-02-23 PROCEDURE — 0SRS019 REPLACEMENT OF LEFT HIP JOINT, FEMORAL SURFACE WITH METAL SYNTHETIC SUBSTITUTE, CEMENTED, OPEN APPROACH: ICD-10-PCS | Performed by: ORTHOPAEDIC SURGERY

## 2019-02-23 PROCEDURE — 2720000010 HC SURG SUPPLY STERILE: Performed by: ORTHOPAEDIC SURGERY

## 2019-02-23 PROCEDURE — 6370000000 HC RX 637 (ALT 250 FOR IP): Performed by: ORTHOPAEDIC SURGERY

## 2019-02-23 PROCEDURE — 3700000001 HC ADD 15 MINUTES (ANESTHESIA): Performed by: ORTHOPAEDIC SURGERY

## 2019-02-23 PROCEDURE — 73502 X-RAY EXAM HIP UNI 2-3 VIEWS: CPT

## 2019-02-23 PROCEDURE — 85027 COMPLETE CBC AUTOMATED: CPT

## 2019-02-23 PROCEDURE — 6370000000 HC RX 637 (ALT 250 FOR IP): Performed by: INTERNAL MEDICINE

## 2019-02-23 PROCEDURE — 82306 VITAMIN D 25 HYDROXY: CPT

## 2019-02-23 PROCEDURE — 83540 ASSAY OF IRON: CPT

## 2019-02-23 PROCEDURE — 2500000003 HC RX 250 WO HCPCS: Performed by: NURSE ANESTHETIST, CERTIFIED REGISTERED

## 2019-02-23 PROCEDURE — 2709999900 HC NON-CHARGEABLE SUPPLY: Performed by: ORTHOPAEDIC SURGERY

## 2019-02-23 PROCEDURE — 3700000000 HC ANESTHESIA ATTENDED CARE: Performed by: ORTHOPAEDIC SURGERY

## 2019-02-23 PROCEDURE — 2580000003 HC RX 258: Performed by: INTERNAL MEDICINE

## 2019-02-23 PROCEDURE — 82607 VITAMIN B-12: CPT

## 2019-02-23 PROCEDURE — L8690 AUD OSSEO DEV, INT/EXT COMP: HCPCS | Performed by: ORTHOPAEDIC SURGERY

## 2019-02-23 PROCEDURE — 83550 IRON BINDING TEST: CPT

## 2019-02-23 PROCEDURE — C1713 ANCHOR/SCREW BN/BN,TIS/BN: HCPCS | Performed by: ORTHOPAEDIC SURGERY

## 2019-02-23 PROCEDURE — 85610 PROTHROMBIN TIME: CPT

## 2019-02-23 PROCEDURE — 6360000002 HC RX W HCPCS: Performed by: ORTHOPAEDIC SURGERY

## 2019-02-23 PROCEDURE — 2580000003 HC RX 258: Performed by: NURSE ANESTHETIST, CERTIFIED REGISTERED

## 2019-02-23 DEVICE — ADAPTER HEAD FEMORAL UNIPOLAR 12/14 +7.0MM: Type: IMPLANTABLE DEVICE | Site: HIP | Status: FUNCTIONAL

## 2019-02-23 DEVICE — IMPLANTABLE DEVICE: Type: IMPLANTABLE DEVICE | Site: HIP | Status: FUNCTIONAL

## 2019-02-23 DEVICE — PLUG BNE CEM L POLYETH FOR 14-17MM IM CNL: Type: IMPLANTABLE DEVICE | Site: FEMUR | Status: FUNCTIONAL

## 2019-02-23 DEVICE — CENTRALIZER STEM CEM 13 MM DSTL HIP VERSYS: Type: IMPLANTABLE DEVICE | Site: HIP | Status: FUNCTIONAL

## 2019-02-23 DEVICE — VERSYS CEM LD/FX SZ 14X135MM: Type: IMPLANTABLE DEVICE | Site: HIP | Status: FUNCTIONAL

## 2019-02-23 RX ORDER — MEPERIDINE HYDROCHLORIDE 50 MG/ML
12.5 INJECTION INTRAMUSCULAR; INTRAVENOUS; SUBCUTANEOUS EVERY 5 MIN PRN
Status: DISCONTINUED | OUTPATIENT
Start: 2019-02-23 | End: 2019-02-23 | Stop reason: HOSPADM

## 2019-02-23 RX ORDER — LIDOCAINE HYDROCHLORIDE 10 MG/ML
INJECTION, SOLUTION INFILTRATION; PERINEURAL PRN
Status: DISCONTINUED | OUTPATIENT
Start: 2019-02-23 | End: 2019-02-23 | Stop reason: SDUPTHER

## 2019-02-23 RX ORDER — ENALAPRILAT 2.5 MG/2ML
1.25 INJECTION INTRAVENOUS
Status: DISCONTINUED | OUTPATIENT
Start: 2019-02-23 | End: 2019-02-23 | Stop reason: HOSPADM

## 2019-02-23 RX ORDER — WARFARIN SODIUM 5 MG/1
5 TABLET ORAL DAILY
Status: DISCONTINUED | OUTPATIENT
Start: 2019-02-23 | End: 2019-02-24

## 2019-02-23 RX ORDER — PROMETHAZINE HYDROCHLORIDE 25 MG/ML
6.25 INJECTION, SOLUTION INTRAMUSCULAR; INTRAVENOUS
Status: DISCONTINUED | OUTPATIENT
Start: 2019-02-23 | End: 2019-02-23 | Stop reason: HOSPADM

## 2019-02-23 RX ORDER — MORPHINE SULFATE 2 MG/ML
4 INJECTION, SOLUTION INTRAMUSCULAR; INTRAVENOUS EVERY 5 MIN PRN
Status: DISCONTINUED | OUTPATIENT
Start: 2019-02-23 | End: 2019-02-23 | Stop reason: HOSPADM

## 2019-02-23 RX ORDER — CEFAZOLIN SODIUM 1 G/3ML
INJECTION, POWDER, FOR SOLUTION INTRAMUSCULAR; INTRAVENOUS PRN
Status: DISCONTINUED | OUTPATIENT
Start: 2019-02-23 | End: 2019-02-23 | Stop reason: SDUPTHER

## 2019-02-23 RX ORDER — KETOROLAC TROMETHAMINE 30 MG/ML
INJECTION, SOLUTION INTRAMUSCULAR; INTRAVENOUS PRN
Status: DISCONTINUED | OUTPATIENT
Start: 2019-02-23 | End: 2019-02-23 | Stop reason: SDUPTHER

## 2019-02-23 RX ORDER — DEXAMETHASONE SODIUM PHOSPHATE 10 MG/ML
INJECTION INTRAMUSCULAR; INTRAVENOUS PRN
Status: DISCONTINUED | OUTPATIENT
Start: 2019-02-23 | End: 2019-02-23 | Stop reason: SDUPTHER

## 2019-02-23 RX ORDER — IRBESARTAN 150 MG/1
150 TABLET ORAL NIGHTLY
COMMUNITY

## 2019-02-23 RX ORDER — SODIUM CHLORIDE, SODIUM LACTATE, POTASSIUM CHLORIDE, CALCIUM CHLORIDE 600; 310; 30; 20 MG/100ML; MG/100ML; MG/100ML; MG/100ML
INJECTION, SOLUTION INTRAVENOUS CONTINUOUS PRN
Status: DISCONTINUED | OUTPATIENT
Start: 2019-02-23 | End: 2019-02-23 | Stop reason: SDUPTHER

## 2019-02-23 RX ORDER — ROCURONIUM BROMIDE 10 MG/ML
INJECTION, SOLUTION INTRAVENOUS PRN
Status: DISCONTINUED | OUTPATIENT
Start: 2019-02-23 | End: 2019-02-23 | Stop reason: SDUPTHER

## 2019-02-23 RX ORDER — ONDANSETRON 2 MG/ML
INJECTION INTRAMUSCULAR; INTRAVENOUS PRN
Status: DISCONTINUED | OUTPATIENT
Start: 2019-02-23 | End: 2019-02-23 | Stop reason: SDUPTHER

## 2019-02-23 RX ORDER — METOCLOPRAMIDE HYDROCHLORIDE 5 MG/ML
10 INJECTION INTRAMUSCULAR; INTRAVENOUS
Status: DISCONTINUED | OUTPATIENT
Start: 2019-02-23 | End: 2019-02-23 | Stop reason: HOSPADM

## 2019-02-23 RX ORDER — FUROSEMIDE 40 MG/1
40 TABLET ORAL DAILY
Status: ON HOLD | COMMUNITY
End: 2019-02-26 | Stop reason: HOSPADM

## 2019-02-23 RX ORDER — LABETALOL HYDROCHLORIDE 5 MG/ML
5 INJECTION, SOLUTION INTRAVENOUS EVERY 10 MIN PRN
Status: DISCONTINUED | OUTPATIENT
Start: 2019-02-23 | End: 2019-02-23 | Stop reason: HOSPADM

## 2019-02-23 RX ORDER — FENTANYL CITRATE 50 UG/ML
INJECTION, SOLUTION INTRAMUSCULAR; INTRAVENOUS PRN
Status: DISCONTINUED | OUTPATIENT
Start: 2019-02-23 | End: 2019-02-23 | Stop reason: SDUPTHER

## 2019-02-23 RX ORDER — PROPOFOL 10 MG/ML
INJECTION, EMULSION INTRAVENOUS PRN
Status: DISCONTINUED | OUTPATIENT
Start: 2019-02-23 | End: 2019-02-23 | Stop reason: SDUPTHER

## 2019-02-23 RX ORDER — HYDRALAZINE HYDROCHLORIDE 20 MG/ML
5 INJECTION INTRAMUSCULAR; INTRAVENOUS EVERY 10 MIN PRN
Status: DISCONTINUED | OUTPATIENT
Start: 2019-02-23 | End: 2019-02-23 | Stop reason: HOSPADM

## 2019-02-23 RX ORDER — MORPHINE SULFATE 2 MG/ML
2 INJECTION, SOLUTION INTRAMUSCULAR; INTRAVENOUS EVERY 5 MIN PRN
Status: DISCONTINUED | OUTPATIENT
Start: 2019-02-23 | End: 2019-02-23 | Stop reason: HOSPADM

## 2019-02-23 RX ORDER — SUCCINYLCHOLINE CHLORIDE 20 MG/ML
INJECTION INTRAMUSCULAR; INTRAVENOUS PRN
Status: DISCONTINUED | OUTPATIENT
Start: 2019-02-23 | End: 2019-02-23 | Stop reason: SDUPTHER

## 2019-02-23 RX ORDER — ERGOCALCIFEROL 1.25 MG/1
50000 CAPSULE ORAL WEEKLY
Status: DISCONTINUED | OUTPATIENT
Start: 2019-02-23 | End: 2019-02-26 | Stop reason: HOSPADM

## 2019-02-23 RX ORDER — DIPHENHYDRAMINE HYDROCHLORIDE 50 MG/ML
12.5 INJECTION INTRAMUSCULAR; INTRAVENOUS
Status: DISCONTINUED | OUTPATIENT
Start: 2019-02-23 | End: 2019-02-23 | Stop reason: HOSPADM

## 2019-02-23 RX ADMIN — ROCURONIUM BROMIDE 20 MG: 10 INJECTION INTRAVENOUS at 09:47

## 2019-02-23 RX ADMIN — DIPHENHYDRAMINE HCL 25 MG: 25 TABLET ORAL at 20:33

## 2019-02-23 RX ADMIN — CEFAZOLIN 2000 MG: 330 INJECTION, POWDER, FOR SOLUTION INTRAMUSCULAR; INTRAVENOUS at 08:19

## 2019-02-23 RX ADMIN — PHENYLEPHRINE HYDROCHLORIDE 100 MCG: 10 INJECTION INTRAVENOUS at 08:09

## 2019-02-23 RX ADMIN — ROCURONIUM BROMIDE 20 MG: 10 INJECTION INTRAVENOUS at 08:48

## 2019-02-23 RX ADMIN — PHENYLEPHRINE HYDROCHLORIDE 200 MCG: 10 INJECTION INTRAVENOUS at 08:15

## 2019-02-23 RX ADMIN — FENTANYL CITRATE 25 MCG: 50 INJECTION INTRAMUSCULAR; INTRAVENOUS at 10:57

## 2019-02-23 RX ADMIN — Medication 10 ML: at 20:35

## 2019-02-23 RX ADMIN — PHENYLEPHRINE HYDROCHLORIDE 100 MCG: 10 INJECTION INTRAVENOUS at 08:22

## 2019-02-23 RX ADMIN — DEXAMETHASONE SODIUM PHOSPHATE 10 MG: 10 INJECTION INTRAMUSCULAR; INTRAVENOUS at 08:12

## 2019-02-23 RX ADMIN — MORPHINE SULFATE 4 MG: 2 INJECTION, SOLUTION INTRAMUSCULAR; INTRAVENOUS at 07:32

## 2019-02-23 RX ADMIN — PHENYLEPHRINE HYDROCHLORIDE 200 MCG: 10 INJECTION INTRAVENOUS at 08:12

## 2019-02-23 RX ADMIN — Medication 2 G: at 16:36

## 2019-02-23 RX ADMIN — OXYCODONE AND ACETAMINOPHEN 1 TABLET: 5; 325 TABLET ORAL at 20:34

## 2019-02-23 RX ADMIN — PHENYLEPHRINE HYDROCHLORIDE 100 MCG: 10 INJECTION INTRAVENOUS at 08:25

## 2019-02-23 RX ADMIN — PHENYLEPHRINE HYDROCHLORIDE 100 MCG: 10 INJECTION INTRAVENOUS at 08:40

## 2019-02-23 RX ADMIN — FENTANYL CITRATE 25 MCG: 50 INJECTION INTRAMUSCULAR; INTRAVENOUS at 09:42

## 2019-02-23 RX ADMIN — ENOXAPARIN SODIUM 40 MG: 40 INJECTION SUBCUTANEOUS at 20:34

## 2019-02-23 RX ADMIN — PHENYLEPHRINE HYDROCHLORIDE 200 MCG: 10 INJECTION INTRAVENOUS at 08:55

## 2019-02-23 RX ADMIN — SODIUM CHLORIDE, SODIUM LACTATE, POTASSIUM CHLORIDE, AND CALCIUM CHLORIDE: 600; 310; 30; 20 INJECTION, SOLUTION INTRAVENOUS at 08:01

## 2019-02-23 RX ADMIN — OXYCODONE AND ACETAMINOPHEN 1 TABLET: 5; 325 TABLET ORAL at 14:48

## 2019-02-23 RX ADMIN — ONDANSETRON HYDROCHLORIDE 4 MG: 2 INJECTION, SOLUTION INTRAMUSCULAR; INTRAVENOUS at 10:40

## 2019-02-23 RX ADMIN — FENTANYL CITRATE 50 MCG: 50 INJECTION INTRAMUSCULAR; INTRAVENOUS at 08:05

## 2019-02-23 RX ADMIN — PHENYLEPHRINE HYDROCHLORIDE 100 MCG: 10 INJECTION INTRAVENOUS at 08:34

## 2019-02-23 RX ADMIN — Medication 10 ML: at 07:33

## 2019-02-23 RX ADMIN — ROCURONIUM BROMIDE 30 MG: 10 INJECTION INTRAVENOUS at 08:20

## 2019-02-23 RX ADMIN — KETOROLAC TROMETHAMINE 10 MG: 30 INJECTION, SOLUTION INTRAMUSCULAR; INTRAVENOUS at 11:01

## 2019-02-23 RX ADMIN — FENTANYL CITRATE 25 MCG: 50 INJECTION INTRAMUSCULAR; INTRAVENOUS at 10:13

## 2019-02-23 RX ADMIN — PHENYLEPHRINE HYDROCHLORIDE 100 MCG: 10 INJECTION INTRAVENOUS at 08:42

## 2019-02-23 RX ADMIN — SUCCINYLCHOLINE CHLORIDE 100 MG: 20 INJECTION, SOLUTION INTRAMUSCULAR; INTRAVENOUS; PARENTERAL at 08:05

## 2019-02-23 RX ADMIN — FENTANYL CITRATE 25 MCG: 50 INJECTION INTRAMUSCULAR; INTRAVENOUS at 10:51

## 2019-02-23 RX ADMIN — FENTANYL CITRATE 25 MCG: 50 INJECTION INTRAMUSCULAR; INTRAVENOUS at 09:49

## 2019-02-23 RX ADMIN — PROPOFOL 140 MG: 10 INJECTION, EMULSION INTRAVENOUS at 08:05

## 2019-02-23 RX ADMIN — FENTANYL CITRATE 25 MCG: 50 INJECTION INTRAMUSCULAR; INTRAVENOUS at 08:46

## 2019-02-23 RX ADMIN — SODIUM CHLORIDE, SODIUM LACTATE, POTASSIUM CHLORIDE, AND CALCIUM CHLORIDE: 600; 310; 30; 20 INJECTION, SOLUTION INTRAVENOUS at 09:00

## 2019-02-23 RX ADMIN — SODIUM CHLORIDE, SODIUM LACTATE, POTASSIUM CHLORIDE, AND CALCIUM CHLORIDE: 600; 310; 30; 20 INJECTION, SOLUTION INTRAVENOUS at 10:38

## 2019-02-23 RX ADMIN — PHENYLEPHRINE HYDROCHLORIDE 100 MCG: 10 INJECTION INTRAVENOUS at 08:17

## 2019-02-23 RX ADMIN — FENTANYL CITRATE 25 MCG: 50 INJECTION INTRAMUSCULAR; INTRAVENOUS at 09:11

## 2019-02-23 RX ADMIN — FENTANYL CITRATE 25 MCG: 50 INJECTION INTRAMUSCULAR; INTRAVENOUS at 11:00

## 2019-02-23 RX ADMIN — SUGAMMADEX 200 MG: 100 INJECTION, SOLUTION INTRAVENOUS at 11:10

## 2019-02-23 RX ADMIN — WARFARIN SODIUM 5 MG: 5 TABLET ORAL at 20:33

## 2019-02-23 RX ADMIN — PHENYLEPHRINE HYDROCHLORIDE 200 MCG: 10 INJECTION INTRAVENOUS at 08:28

## 2019-02-23 RX ADMIN — LIDOCAINE HYDROCHLORIDE 50 MG: 10 INJECTION, SOLUTION INFILTRATION; PERINEURAL at 08:05

## 2019-02-23 ASSESSMENT — ENCOUNTER SYMPTOMS
NAUSEA: 0
VOMITING: 0
COUGH: 0
DIARRHEA: 0
SHORTNESS OF BREATH: 0

## 2019-02-23 ASSESSMENT — PAIN SCALES - GENERAL
PAINLEVEL_OUTOF10: 4
PAINLEVEL_OUTOF10: 0
PAINLEVEL_OUTOF10: 0
PAINLEVEL_OUTOF10: 4
PAINLEVEL_OUTOF10: 5
PAINLEVEL_OUTOF10: 10

## 2019-02-24 LAB
HCT VFR BLD CALC: 39.7 % (ref 42–52)
HEMOGLOBIN: 12.8 G/DL (ref 14–18)
MCH RBC QN AUTO: 28.6 PG (ref 27–31)
MCHC RBC AUTO-ENTMCNC: 32.2 G/DL (ref 33–37)
MCV RBC AUTO: 88.8 FL (ref 80–94)
PDW BLD-RTO: 13.9 % (ref 11.5–14.5)
PLATELET # BLD: 151 K/UL (ref 130–400)
PMV BLD AUTO: 11.4 FL (ref 9.4–12.4)
RBC # BLD: 4.47 M/UL (ref 4.7–6.1)
WBC # BLD: 11.9 K/UL (ref 4.8–10.8)

## 2019-02-24 PROCEDURE — 6360000002 HC RX W HCPCS: Performed by: ORTHOPAEDIC SURGERY

## 2019-02-24 PROCEDURE — 6360000002 HC RX W HCPCS: Performed by: INTERNAL MEDICINE

## 2019-02-24 PROCEDURE — 6370000000 HC RX 637 (ALT 250 FOR IP): Performed by: INTERNAL MEDICINE

## 2019-02-24 PROCEDURE — 97162 PT EVAL MOD COMPLEX 30 MIN: CPT

## 2019-02-24 PROCEDURE — 51798 US URINE CAPACITY MEASURE: CPT

## 2019-02-24 PROCEDURE — 1210000000 HC MED SURG R&B

## 2019-02-24 PROCEDURE — 51701 INSERT BLADDER CATHETER: CPT

## 2019-02-24 PROCEDURE — 36415 COLL VENOUS BLD VENIPUNCTURE: CPT

## 2019-02-24 PROCEDURE — 85027 COMPLETE CBC AUTOMATED: CPT

## 2019-02-24 PROCEDURE — 97530 THERAPEUTIC ACTIVITIES: CPT

## 2019-02-24 PROCEDURE — 2580000003 HC RX 258: Performed by: INTERNAL MEDICINE

## 2019-02-24 RX ORDER — TAMSULOSIN HYDROCHLORIDE 0.4 MG/1
0.4 CAPSULE ORAL DAILY
Status: DISCONTINUED | OUTPATIENT
Start: 2019-02-24 | End: 2019-02-25

## 2019-02-24 RX ORDER — WARFARIN SODIUM 5 MG/1
5 TABLET ORAL
Status: DISCONTINUED | OUTPATIENT
Start: 2019-02-25 | End: 2019-02-26 | Stop reason: HOSPADM

## 2019-02-24 RX ORDER — WARFARIN SODIUM 7.5 MG/1
7.5 TABLET ORAL
Status: DISCONTINUED | OUTPATIENT
Start: 2019-02-24 | End: 2019-02-26 | Stop reason: HOSPADM

## 2019-02-24 RX ORDER — WARFARIN SODIUM 5 MG/1
5 TABLET ORAL DAILY
Status: DISCONTINUED | OUTPATIENT
Start: 2019-02-24 | End: 2019-02-24

## 2019-02-24 RX ADMIN — Medication 2 G: at 01:25

## 2019-02-24 RX ADMIN — Medication 10 ML: at 07:46

## 2019-02-24 RX ADMIN — WARFARIN SODIUM 7.5 MG: 7.5 TABLET ORAL at 17:24

## 2019-02-24 RX ADMIN — Medication 10 ML: at 20:33

## 2019-02-24 RX ADMIN — OXYCODONE AND ACETAMINOPHEN 1 TABLET: 5; 325 TABLET ORAL at 17:23

## 2019-02-24 RX ADMIN — OXYCODONE AND ACETAMINOPHEN 1 TABLET: 5; 325 TABLET ORAL at 07:46

## 2019-02-24 RX ADMIN — MAGNESIUM HYDROXIDE 30 ML: 400 SUSPENSION ORAL at 07:46

## 2019-02-24 RX ADMIN — OXYCODONE AND ACETAMINOPHEN 1 TABLET: 5; 325 TABLET ORAL at 21:40

## 2019-02-24 RX ADMIN — ENOXAPARIN SODIUM 40 MG: 40 INJECTION SUBCUTANEOUS at 20:33

## 2019-02-24 RX ADMIN — TAMSULOSIN HYDROCHLORIDE 0.4 MG: 0.4 CAPSULE ORAL at 17:23

## 2019-02-24 RX ADMIN — OXYCODONE AND ACETAMINOPHEN 1 TABLET: 5; 325 TABLET ORAL at 12:02

## 2019-02-24 RX ADMIN — HYDROCHLOROTHIAZIDE 12.5 MG: 12.5 CAPSULE ORAL at 07:46

## 2019-02-24 ASSESSMENT — PAIN SCALES - GENERAL
PAINLEVEL_OUTOF10: 4
PAINLEVEL_OUTOF10: 5
PAINLEVEL_OUTOF10: 3
PAINLEVEL_OUTOF10: 0
PAINLEVEL_OUTOF10: 2
PAINLEVEL_OUTOF10: 5
PAINLEVEL_OUTOF10: 5

## 2019-02-24 ASSESSMENT — ENCOUNTER SYMPTOMS
VOMITING: 0
NAUSEA: 0
DIARRHEA: 0
COUGH: 0
SHORTNESS OF BREATH: 0

## 2019-02-25 LAB
HCT VFR BLD CALC: 38.2 % (ref 42–52)
HEMOGLOBIN: 12.3 G/DL (ref 14–18)
INR BLD: 1.84 (ref 0.88–1.18)
MCH RBC QN AUTO: 28.7 PG (ref 27–31)
MCHC RBC AUTO-ENTMCNC: 32.2 G/DL (ref 33–37)
MCV RBC AUTO: 89 FL (ref 80–94)
PDW BLD-RTO: 13.9 % (ref 11.5–14.5)
PLATELET # BLD: 162 K/UL (ref 130–400)
PMV BLD AUTO: 10.8 FL (ref 9.4–12.4)
PROTHROMBIN TIME: 20.5 SEC (ref 12–14.6)
RBC # BLD: 4.29 M/UL (ref 4.7–6.1)
WBC # BLD: 8.6 K/UL (ref 4.8–10.8)

## 2019-02-25 PROCEDURE — 36415 COLL VENOUS BLD VENIPUNCTURE: CPT

## 2019-02-25 PROCEDURE — 51798 US URINE CAPACITY MEASURE: CPT

## 2019-02-25 PROCEDURE — 85610 PROTHROMBIN TIME: CPT

## 2019-02-25 PROCEDURE — 1210000000 HC MED SURG R&B

## 2019-02-25 PROCEDURE — 97166 OT EVAL MOD COMPLEX 45 MIN: CPT

## 2019-02-25 PROCEDURE — 97530 THERAPEUTIC ACTIVITIES: CPT

## 2019-02-25 PROCEDURE — 97116 GAIT TRAINING THERAPY: CPT

## 2019-02-25 PROCEDURE — 85027 COMPLETE CBC AUTOMATED: CPT

## 2019-02-25 PROCEDURE — 51702 INSERT TEMP BLADDER CATH: CPT

## 2019-02-25 PROCEDURE — 6370000000 HC RX 637 (ALT 250 FOR IP): Performed by: INTERNAL MEDICINE

## 2019-02-25 PROCEDURE — 51701 INSERT BLADDER CATHETER: CPT

## 2019-02-25 PROCEDURE — 6360000002 HC RX W HCPCS: Performed by: INTERNAL MEDICINE

## 2019-02-25 RX ORDER — TRAMADOL HYDROCHLORIDE 50 MG/1
50 TABLET ORAL EVERY 6 HOURS PRN
Status: DISCONTINUED | OUTPATIENT
Start: 2019-02-25 | End: 2019-02-26 | Stop reason: HOSPADM

## 2019-02-25 RX ORDER — BISACODYL 10 MG
10 SUPPOSITORY, RECTAL RECTAL DAILY PRN
Status: DISCONTINUED | OUTPATIENT
Start: 2019-02-25 | End: 2019-02-26 | Stop reason: HOSPADM

## 2019-02-25 RX ORDER — POLYETHYLENE GLYCOL 3350 17 G/17G
17 POWDER, FOR SOLUTION ORAL ONCE
Status: COMPLETED | OUTPATIENT
Start: 2019-02-25 | End: 2019-02-25

## 2019-02-25 RX ORDER — TAMSULOSIN HYDROCHLORIDE 0.4 MG/1
0.8 CAPSULE ORAL DAILY
Status: DISCONTINUED | OUTPATIENT
Start: 2019-02-25 | End: 2019-02-26 | Stop reason: HOSPADM

## 2019-02-25 RX ADMIN — ENOXAPARIN SODIUM 40 MG: 40 INJECTION SUBCUTANEOUS at 19:33

## 2019-02-25 RX ADMIN — HYDROCHLOROTHIAZIDE 12.5 MG: 12.5 CAPSULE ORAL at 08:18

## 2019-02-25 RX ADMIN — TRAMADOL HYDROCHLORIDE 50 MG: 50 TABLET, FILM COATED ORAL at 13:44

## 2019-02-25 RX ADMIN — WARFARIN SODIUM 5 MG: 5 TABLET ORAL at 18:57

## 2019-02-25 RX ADMIN — TAMSULOSIN HYDROCHLORIDE 0.8 MG: 0.4 CAPSULE ORAL at 08:18

## 2019-02-25 RX ADMIN — TRAMADOL HYDROCHLORIDE 50 MG: 50 TABLET, FILM COATED ORAL at 19:33

## 2019-02-25 RX ADMIN — POLYETHYLENE GLYCOL 3350 17 G: 17 POWDER, FOR SOLUTION ORAL at 08:18

## 2019-02-25 ASSESSMENT — PAIN DESCRIPTION - ORIENTATION
ORIENTATION: LEFT
ORIENTATION: LEFT

## 2019-02-25 ASSESSMENT — PAIN SCALES - GENERAL
PAINLEVEL_OUTOF10: 4
PAINLEVEL_OUTOF10: 6
PAINLEVEL_OUTOF10: 4
PAINLEVEL_OUTOF10: 5
PAINLEVEL_OUTOF10: 5

## 2019-02-25 ASSESSMENT — PAIN DESCRIPTION - PAIN TYPE
TYPE: SURGICAL PAIN
TYPE: ACUTE PAIN;SURGICAL PAIN

## 2019-02-25 ASSESSMENT — PAIN - FUNCTIONAL ASSESSMENT: PAIN_FUNCTIONAL_ASSESSMENT: PREVENTS OR INTERFERES SOME ACTIVE ACTIVITIES AND ADLS

## 2019-02-25 ASSESSMENT — PAIN DESCRIPTION - FREQUENCY: FREQUENCY: CONTINUOUS

## 2019-02-25 ASSESSMENT — ENCOUNTER SYMPTOMS
COUGH: 0
NAUSEA: 0
VOMITING: 0
SHORTNESS OF BREATH: 0
DIARRHEA: 0

## 2019-02-25 ASSESSMENT — PAIN DESCRIPTION - DESCRIPTORS: DESCRIPTORS: BURNING;PRESSURE

## 2019-02-25 ASSESSMENT — PAIN DESCRIPTION - LOCATION
LOCATION: HIP;ELBOW;KNEE
LOCATION: HIP

## 2019-02-25 ASSESSMENT — PAIN DESCRIPTION - ONSET: ONSET: ON-GOING

## 2019-02-25 ASSESSMENT — PAIN DESCRIPTION - PROGRESSION: CLINICAL_PROGRESSION: GRADUALLY IMPROVING

## 2019-02-26 VITALS
SYSTOLIC BLOOD PRESSURE: 136 MMHG | HEART RATE: 97 BPM | TEMPERATURE: 97.1 F | RESPIRATION RATE: 18 BRPM | OXYGEN SATURATION: 91 % | DIASTOLIC BLOOD PRESSURE: 87 MMHG

## 2019-02-26 LAB
EKG P AXIS: NORMAL DEGREES
EKG P-R INTERVAL: NORMAL MS
EKG Q-T INTERVAL: 352 MS
EKG QRS DURATION: 106 MS
EKG QTC CALCULATION (BAZETT): 422 MS
EKG T AXIS: 6 DEGREES
HCT VFR BLD CALC: 36.5 % (ref 42–52)
HEMOGLOBIN: 11.8 G/DL (ref 14–18)
MCH RBC QN AUTO: 28.7 PG (ref 27–31)
MCHC RBC AUTO-ENTMCNC: 32.3 G/DL (ref 33–37)
MCV RBC AUTO: 88.8 FL (ref 80–94)
PDW BLD-RTO: 13.7 % (ref 11.5–14.5)
PLATELET # BLD: 175 K/UL (ref 130–400)
PMV BLD AUTO: 11.3 FL (ref 9.4–12.4)
RBC # BLD: 4.11 M/UL (ref 4.7–6.1)
WBC # BLD: 8.6 K/UL (ref 4.8–10.8)

## 2019-02-26 PROCEDURE — 97530 THERAPEUTIC ACTIVITIES: CPT

## 2019-02-26 PROCEDURE — 6370000000 HC RX 637 (ALT 250 FOR IP): Performed by: INTERNAL MEDICINE

## 2019-02-26 PROCEDURE — 85027 COMPLETE CBC AUTOMATED: CPT

## 2019-02-26 PROCEDURE — 36415 COLL VENOUS BLD VENIPUNCTURE: CPT

## 2019-02-26 PROCEDURE — 97116 GAIT TRAINING THERAPY: CPT

## 2019-02-26 PROCEDURE — 97535 SELF CARE MNGMENT TRAINING: CPT

## 2019-02-26 RX ORDER — ACETAMINOPHEN 500 MG
500 TABLET ORAL NIGHTLY PRN
Qty: 120 TABLET | Refills: 3 | DISCHARGE
Start: 2019-02-26

## 2019-02-26 RX ORDER — DIPHENHYDRAMINE HCL 25 MG
25 TABLET ORAL NIGHTLY PRN
DISCHARGE
Start: 2019-02-26 | End: 2019-03-28

## 2019-02-26 RX ORDER — BISACODYL 10 MG
10 SUPPOSITORY, RECTAL RECTAL DAILY PRN
DISCHARGE
Start: 2019-02-26 | End: 2019-03-28

## 2019-02-26 RX ORDER — ERGOCALCIFEROL (VITAMIN D2) 1250 MCG
50000 CAPSULE ORAL WEEKLY
Qty: 5 CAPSULE | DISCHARGE
Start: 2019-02-26

## 2019-02-26 RX ORDER — TRAMADOL HYDROCHLORIDE 50 MG/1
50 TABLET ORAL EVERY 6 HOURS PRN
Qty: 20 TABLET | Refills: 0
Start: 2019-02-26 | End: 2019-03-03

## 2019-02-26 RX ORDER — TAMSULOSIN HYDROCHLORIDE 0.4 MG/1
0.8 CAPSULE ORAL DAILY
Qty: 30 CAPSULE | Refills: 3 | DISCHARGE
Start: 2019-02-27

## 2019-02-26 RX ADMIN — HYDROCHLOROTHIAZIDE 12.5 MG: 12.5 CAPSULE ORAL at 08:50

## 2019-02-26 RX ADMIN — TRAMADOL HYDROCHLORIDE 50 MG: 50 TABLET, FILM COATED ORAL at 13:16

## 2019-02-26 RX ADMIN — TAMSULOSIN HYDROCHLORIDE 0.8 MG: 0.4 CAPSULE ORAL at 08:51

## 2019-02-26 RX ADMIN — TRAMADOL HYDROCHLORIDE 50 MG: 50 TABLET, FILM COATED ORAL at 06:44

## 2019-02-26 ASSESSMENT — PAIN SCALES - GENERAL
PAINLEVEL_OUTOF10: 5

## 2019-02-26 ASSESSMENT — PAIN DESCRIPTION - LOCATION: LOCATION: HIP

## 2019-02-26 ASSESSMENT — PAIN DESCRIPTION - ORIENTATION: ORIENTATION: LEFT

## 2019-04-04 ENCOUNTER — TELEPHONE (OUTPATIENT)
Dept: PODIATRY | Facility: CLINIC | Age: 84
End: 2019-04-04

## 2019-04-04 NOTE — TELEPHONE ENCOUNTER
Unable to reach patient to remind him of appointment on Monday April 8, 2019 with Dr. Navneet Brizuela.

## 2019-04-08 ENCOUNTER — TELEPHONE (OUTPATIENT)
Dept: PODIATRY | Facility: CLINIC | Age: 84
End: 2019-04-08

## 2019-06-01 ENCOUNTER — APPOINTMENT (OUTPATIENT)
Dept: CT IMAGING | Facility: HOSPITAL | Age: 84
End: 2019-06-01

## 2019-06-01 ENCOUNTER — APPOINTMENT (OUTPATIENT)
Dept: ULTRASOUND IMAGING | Facility: HOSPITAL | Age: 84
End: 2019-06-01

## 2019-06-01 ENCOUNTER — HOSPITAL ENCOUNTER (OUTPATIENT)
Facility: HOSPITAL | Age: 84
Setting detail: OBSERVATION
Discharge: NURSING, REHAB OR LONG-TERM CARE | End: 2019-06-04
Attending: INTERNAL MEDICINE | Admitting: INTERNAL MEDICINE

## 2019-06-01 ENCOUNTER — APPOINTMENT (OUTPATIENT)
Dept: GENERAL RADIOLOGY | Facility: HOSPITAL | Age: 84
End: 2019-06-01

## 2019-06-01 DIAGNOSIS — R41.82 ALTERED MENTAL STATUS, UNSPECIFIED ALTERED MENTAL STATUS TYPE: Primary | ICD-10-CM

## 2019-06-01 DIAGNOSIS — R79.1 SUBTHERAPEUTIC INTERNATIONAL NORMALIZED RATIO (INR): ICD-10-CM

## 2019-06-01 DIAGNOSIS — Z78.9 DECREASED ACTIVITIES OF DAILY LIVING (ADL): ICD-10-CM

## 2019-06-01 DIAGNOSIS — Z74.09 IMPAIRED MOBILITY: ICD-10-CM

## 2019-06-01 LAB
ALBUMIN SERPL-MCNC: 3.8 G/DL (ref 3.5–5)
ALBUMIN/GLOB SERPL: 1.4 G/DL (ref 1.1–2.5)
ALP SERPL-CCNC: 135 U/L (ref 24–120)
ALT SERPL W P-5'-P-CCNC: 25 U/L (ref 0–54)
ANION GAP SERPL CALCULATED.3IONS-SCNC: 6 MMOL/L (ref 4–13)
APTT PPP: 34.2 SECONDS (ref 24.1–35)
AST SERPL-CCNC: 33 U/L (ref 7–45)
BASOPHILS # BLD AUTO: 0.04 10*3/MM3 (ref 0–0.2)
BASOPHILS NFR BLD AUTO: 0.6 % (ref 0–2)
BILIRUB SERPL-MCNC: 0.9 MG/DL (ref 0.1–1)
BILIRUB UR QL STRIP: NEGATIVE
BUN BLD-MCNC: 12 MG/DL (ref 5–21)
BUN/CREAT SERPL: 15 (ref 7–25)
CALCIUM SPEC-SCNC: 9.1 MG/DL (ref 8.4–10.4)
CHLORIDE SERPL-SCNC: 106 MMOL/L (ref 98–110)
CLARITY UR: CLEAR
CO2 SERPL-SCNC: 29 MMOL/L (ref 24–31)
COLOR UR: YELLOW
CREAT BLD-MCNC: 0.8 MG/DL (ref 0.5–1.4)
D-LACTATE SERPL-SCNC: 1.3 MMOL/L (ref 0.5–2)
DEPRECATED RDW RBC AUTO: 41.5 FL (ref 40–54)
EOSINOPHIL # BLD AUTO: 0.18 10*3/MM3 (ref 0–0.7)
EOSINOPHIL NFR BLD AUTO: 2.6 % (ref 0–4)
ERYTHROCYTE [DISTWIDTH] IN BLOOD BY AUTOMATED COUNT: 13.3 % (ref 12–15)
GFR SERPL CREATININE-BSD FRML MDRD: 92 ML/MIN/1.73
GLOBULIN UR ELPH-MCNC: 2.7 GM/DL
GLUCOSE BLD-MCNC: 95 MG/DL (ref 70–100)
GLUCOSE BLDC GLUCOMTR-MCNC: 87 MG/DL (ref 70–130)
GLUCOSE UR STRIP-MCNC: NEGATIVE MG/DL
HCT VFR BLD AUTO: 45.1 % (ref 40–52)
HGB BLD-MCNC: 14.8 G/DL (ref 14–18)
HGB UR QL STRIP.AUTO: NEGATIVE
HOLD SPECIMEN: NORMAL
IMM GRANULOCYTES # BLD AUTO: 0.03 10*3/MM3 (ref 0–0.05)
IMM GRANULOCYTES NFR BLD AUTO: 0.4 % (ref 0–5)
INR PPP: 1.35 (ref 0.91–1.09)
KETONES UR QL STRIP: ABNORMAL
LEUKOCYTE ESTERASE UR QL STRIP.AUTO: NEGATIVE
LIPASE SERPL-CCNC: 45 U/L (ref 23–203)
LYMPHOCYTES # BLD AUTO: 1.42 10*3/MM3 (ref 0.72–4.86)
LYMPHOCYTES NFR BLD AUTO: 20.5 % (ref 15–45)
MAGNESIUM SERPL-MCNC: 2 MG/DL (ref 1.4–2.2)
MCH RBC QN AUTO: 28.1 PG (ref 28–32)
MCHC RBC AUTO-ENTMCNC: 32.8 G/DL (ref 33–36)
MCV RBC AUTO: 85.6 FL (ref 82–95)
MONOCYTES # BLD AUTO: 0.68 10*3/MM3 (ref 0.19–1.3)
MONOCYTES NFR BLD AUTO: 9.8 % (ref 4–12)
NEUTROPHILS # BLD AUTO: 4.56 10*3/MM3 (ref 1.87–8.4)
NEUTROPHILS NFR BLD AUTO: 66.1 % (ref 39–78)
NITRITE UR QL STRIP: NEGATIVE
NRBC BLD AUTO-RTO: 0 /100 WBC (ref 0–0.2)
NT-PROBNP SERPL-MCNC: 949 PG/ML (ref 0–1800)
PH UR STRIP.AUTO: 7 [PH] (ref 5–8)
PLATELET # BLD AUTO: 165 10*3/MM3 (ref 130–400)
PMV BLD AUTO: 11 FL (ref 6–12)
POTASSIUM BLD-SCNC: 3.9 MMOL/L (ref 3.5–5.3)
PROCALCITONIN SERPL-MCNC: <0.25 NG/ML
PROT SERPL-MCNC: 6.5 G/DL (ref 6.3–8.7)
PROT UR QL STRIP: ABNORMAL
PROTHROMBIN TIME: 17.1 SECONDS (ref 11.9–14.6)
RBC # BLD AUTO: 5.27 10*6/MM3 (ref 4.8–5.9)
SODIUM BLD-SCNC: 141 MMOL/L (ref 135–145)
SP GR UR STRIP: 1.02 (ref 1–1.03)
TROPONIN I SERPL-MCNC: <0.012 NG/ML (ref 0–0.03)
TSH SERPL DL<=0.05 MIU/L-ACNC: 1.39 MIU/ML (ref 0.47–4.68)
UROBILINOGEN UR QL STRIP: ABNORMAL
WBC NRBC COR # BLD: 6.91 10*3/MM3 (ref 4.8–10.8)
WHOLE BLOOD HOLD SPECIMEN: NORMAL
WHOLE BLOOD HOLD SPECIMEN: NORMAL

## 2019-06-01 PROCEDURE — 85610 PROTHROMBIN TIME: CPT | Performed by: PHYSICIAN ASSISTANT

## 2019-06-01 PROCEDURE — 84145 PROCALCITONIN (PCT): CPT | Performed by: PHYSICIAN ASSISTANT

## 2019-06-01 PROCEDURE — 85025 COMPLETE CBC W/AUTO DIFF WBC: CPT | Performed by: PHYSICIAN ASSISTANT

## 2019-06-01 PROCEDURE — 93971 EXTREMITY STUDY: CPT

## 2019-06-01 PROCEDURE — 71275 CT ANGIOGRAPHY CHEST: CPT

## 2019-06-01 PROCEDURE — 83605 ASSAY OF LACTIC ACID: CPT | Performed by: PHYSICIAN ASSISTANT

## 2019-06-01 PROCEDURE — 83690 ASSAY OF LIPASE: CPT | Performed by: PHYSICIAN ASSISTANT

## 2019-06-01 PROCEDURE — 82962 GLUCOSE BLOOD TEST: CPT

## 2019-06-01 PROCEDURE — 71045 X-RAY EXAM CHEST 1 VIEW: CPT

## 2019-06-01 PROCEDURE — 93005 ELECTROCARDIOGRAM TRACING: CPT | Performed by: INTERNAL MEDICINE

## 2019-06-01 PROCEDURE — 96374 THER/PROPH/DIAG INJ IV PUSH: CPT

## 2019-06-01 PROCEDURE — 25010000002 HYDRALAZINE PER 20 MG: Performed by: PHYSICIAN ASSISTANT

## 2019-06-01 PROCEDURE — 81003 URINALYSIS AUTO W/O SCOPE: CPT | Performed by: PHYSICIAN ASSISTANT

## 2019-06-01 PROCEDURE — 0 IOPAMIDOL PER 1 ML: Performed by: PHYSICIAN ASSISTANT

## 2019-06-01 PROCEDURE — 80053 COMPREHEN METABOLIC PANEL: CPT | Performed by: PHYSICIAN ASSISTANT

## 2019-06-01 PROCEDURE — 70450 CT HEAD/BRAIN W/O DYE: CPT

## 2019-06-01 PROCEDURE — 83880 ASSAY OF NATRIURETIC PEPTIDE: CPT | Performed by: PHYSICIAN ASSISTANT

## 2019-06-01 PROCEDURE — 87040 BLOOD CULTURE FOR BACTERIA: CPT | Performed by: PHYSICIAN ASSISTANT

## 2019-06-01 PROCEDURE — 84484 ASSAY OF TROPONIN QUANT: CPT | Performed by: PHYSICIAN ASSISTANT

## 2019-06-01 PROCEDURE — 93971 EXTREMITY STUDY: CPT | Performed by: SURGERY

## 2019-06-01 PROCEDURE — 93005 ELECTROCARDIOGRAM TRACING: CPT | Performed by: PHYSICIAN ASSISTANT

## 2019-06-01 PROCEDURE — 83735 ASSAY OF MAGNESIUM: CPT | Performed by: PHYSICIAN ASSISTANT

## 2019-06-01 PROCEDURE — 93010 ELECTROCARDIOGRAM REPORT: CPT | Performed by: INTERNAL MEDICINE

## 2019-06-01 PROCEDURE — 99285 EMERGENCY DEPT VISIT HI MDM: CPT

## 2019-06-01 PROCEDURE — 85730 THROMBOPLASTIN TIME PARTIAL: CPT | Performed by: PHYSICIAN ASSISTANT

## 2019-06-01 PROCEDURE — 84443 ASSAY THYROID STIM HORMONE: CPT | Performed by: PHYSICIAN ASSISTANT

## 2019-06-01 RX ORDER — VITAMIN E 268 MG
400 CAPSULE ORAL DAILY
COMMUNITY

## 2019-06-01 RX ORDER — WARFARIN SODIUM 7.5 MG/1
7.5 TABLET ORAL EVERY OTHER DAY
COMMUNITY
End: 2020-02-25

## 2019-06-01 RX ORDER — ACETAMINOPHEN 500 MG
500 TABLET ORAL EVERY 6 HOURS PRN
COMMUNITY

## 2019-06-01 RX ORDER — HYDRALAZINE HYDROCHLORIDE 20 MG/ML
20 INJECTION INTRAMUSCULAR; INTRAVENOUS ONCE
Status: COMPLETED | OUTPATIENT
Start: 2019-06-01 | End: 2019-06-01

## 2019-06-01 RX ORDER — DIPHENHYDRAMINE HCL 25 MG
25 CAPSULE ORAL EVERY 6 HOURS PRN
Status: ON HOLD | COMMUNITY
End: 2019-06-02 | Stop reason: ALTCHOICE

## 2019-06-01 RX ADMIN — IOPAMIDOL 150 ML: 755 INJECTION, SOLUTION INTRAVENOUS at 19:32

## 2019-06-01 RX ADMIN — HYDRALAZINE HYDROCHLORIDE 20 MG: 20 INJECTION INTRAMUSCULAR; INTRAVENOUS at 17:52

## 2019-06-02 LAB
INR PPP: 1.33 (ref 0.91–1.09)
MAGNESIUM SERPL-MCNC: 2.1 MG/DL (ref 1.4–2.2)
PROTHROMBIN TIME: 16.9 SECONDS (ref 11.9–14.6)
VIT B12 BLD-MCNC: 851 PG/ML (ref 239–931)

## 2019-06-02 PROCEDURE — G0378 HOSPITAL OBSERVATION PER HR: HCPCS

## 2019-06-02 PROCEDURE — 97162 PT EVAL MOD COMPLEX 30 MIN: CPT

## 2019-06-02 PROCEDURE — 94760 N-INVAS EAR/PLS OXIMETRY 1: CPT

## 2019-06-02 PROCEDURE — 94799 UNLISTED PULMONARY SVC/PX: CPT

## 2019-06-02 PROCEDURE — 83735 ASSAY OF MAGNESIUM: CPT | Performed by: NURSE PRACTITIONER

## 2019-06-02 PROCEDURE — 85610 PROTHROMBIN TIME: CPT | Performed by: NURSE PRACTITIONER

## 2019-06-02 PROCEDURE — 82607 VITAMIN B-12: CPT | Performed by: NURSE PRACTITIONER

## 2019-06-02 PROCEDURE — 97166 OT EVAL MOD COMPLEX 45 MIN: CPT | Performed by: OCCUPATIONAL THERAPIST

## 2019-06-02 PROCEDURE — 99215 OFFICE O/P EST HI 40 MIN: CPT | Performed by: PSYCHIATRY & NEUROLOGY

## 2019-06-02 RX ORDER — SODIUM CHLORIDE 0.9 % (FLUSH) 0.9 %
3 SYRINGE (ML) INJECTION EVERY 12 HOURS SCHEDULED
Status: DISCONTINUED | OUTPATIENT
Start: 2019-06-02 | End: 2019-06-04 | Stop reason: HOSPADM

## 2019-06-02 RX ORDER — WARFARIN SODIUM 5 MG/1
5 TABLET ORAL EVERY OTHER DAY
Status: DISCONTINUED | OUTPATIENT
Start: 2019-06-03 | End: 2019-06-02

## 2019-06-02 RX ORDER — SODIUM CHLORIDE 0.9 % (FLUSH) 0.9 %
3-10 SYRINGE (ML) INJECTION AS NEEDED
Status: DISCONTINUED | OUTPATIENT
Start: 2019-06-02 | End: 2019-06-04 | Stop reason: HOSPADM

## 2019-06-02 RX ORDER — VITAMIN E 268 MG
400 CAPSULE ORAL DAILY
Status: DISCONTINUED | OUTPATIENT
Start: 2019-06-02 | End: 2019-06-04 | Stop reason: HOSPADM

## 2019-06-02 RX ORDER — WARFARIN SODIUM 7.5 MG/1
7.5 TABLET ORAL EVERY OTHER DAY
Status: DISCONTINUED | OUTPATIENT
Start: 2019-06-02 | End: 2019-06-02

## 2019-06-02 RX ORDER — DIPHENHYDRAMINE HCL 25 MG
25 CAPSULE ORAL EVERY 6 HOURS PRN
Status: DISCONTINUED | OUTPATIENT
Start: 2019-06-02 | End: 2019-06-03

## 2019-06-02 RX ORDER — WARFARIN SODIUM 5 MG/1
5 TABLET ORAL EVERY OTHER DAY
Status: DISCONTINUED | OUTPATIENT
Start: 2019-06-02 | End: 2019-06-03

## 2019-06-02 RX ORDER — WARFARIN SODIUM 7.5 MG/1
7.5 TABLET ORAL EVERY OTHER DAY
Status: DISCONTINUED | OUTPATIENT
Start: 2019-06-03 | End: 2019-06-03

## 2019-06-02 RX ORDER — ACETAMINOPHEN 500 MG
500 TABLET ORAL EVERY 6 HOURS PRN
Status: DISCONTINUED | OUTPATIENT
Start: 2019-06-02 | End: 2019-06-04 | Stop reason: HOSPADM

## 2019-06-02 RX ORDER — LOSARTAN POTASSIUM 50 MG/1
50 TABLET ORAL
Status: DISCONTINUED | OUTPATIENT
Start: 2019-06-02 | End: 2019-06-04 | Stop reason: HOSPADM

## 2019-06-02 RX ORDER — VITS A,C,E/LUTEIN/MINERALS 300MCG-200
1 TABLET ORAL DAILY
Status: DISCONTINUED | OUTPATIENT
Start: 2019-06-02 | End: 2019-06-04 | Stop reason: HOSPADM

## 2019-06-02 RX ORDER — ACETAMINOPHEN 325 MG/1
650 TABLET ORAL EVERY 4 HOURS PRN
Status: DISCONTINUED | OUTPATIENT
Start: 2019-06-02 | End: 2019-06-04 | Stop reason: HOSPADM

## 2019-06-02 RX ORDER — UREA 10 %
50 LOTION (ML) TOPICAL DAILY
Status: DISCONTINUED | OUTPATIENT
Start: 2019-06-02 | End: 2019-06-04 | Stop reason: HOSPADM

## 2019-06-02 RX ADMIN — METOPROLOL TARTRATE 25 MG: 25 TABLET, FILM COATED ORAL at 22:59

## 2019-06-02 RX ADMIN — WARFARIN SODIUM 5 MG: 5 TABLET ORAL at 18:04

## 2019-06-02 RX ADMIN — SODIUM CHLORIDE, PRESERVATIVE FREE 3 ML: 5 INJECTION INTRAVENOUS at 21:52

## 2019-06-02 RX ADMIN — SODIUM CHLORIDE, PRESERVATIVE FREE 3 ML: 5 INJECTION INTRAVENOUS at 14:05

## 2019-06-02 RX ADMIN — LOSARTAN POTASSIUM 50 MG: 50 TABLET, FILM COATED ORAL at 14:05

## 2019-06-02 RX ADMIN — VITAMIN E CAP 400 UNIT 400 UNITS: 400 CAP at 14:05

## 2019-06-02 RX ADMIN — VITAM B12 50 MCG: 100 TAB at 14:05

## 2019-06-02 RX ADMIN — Medication 1 TABLET: at 14:05

## 2019-06-02 NOTE — DISCHARGE PLACEMENT REQUEST
"Sangita Bryson (86 y.o. Male)     Date of Birth Social Security Number Address Home Phone MRN    01/05/1933  [id number]  [facility] [phone] [id number]    Shinto Marital Status          Shinto        Admission Date Admission Type Admitting Provider Attending Provider Department, Room/Bed    6/1/19 Emergency Anatoliy Peres MD Wilson, Richard Scott, MD [facility] 3A, 355/1    Discharge Date Discharge Disposition Discharge Destination                       Attending Provider:  Anatoliy Peres MD    Allergies:  Pradaxa [Dabigatran Etexilate Mesylate], Other    Isolation:  None   Infection:  None   Code Status:  CPR    Ht:  182.9 cm (72\")   Wt:  96.1 kg (211 lb 14.4 oz)    Admission Cmt:  None   Principal Problem:  None                Active Insurance as of 6/1/2019     Primary Coverage     Payor Plan Insurance Group Employer/Plan Group    HUMANA MEDICARE REPLACEMENT HUMANA MEDICARE REPL [id number]     Payor Plan Address Payor Plan Phone Number Payor Plan Fax Number Effective Dates    [address] [address] [phone]  1/1/2018 - None Entered    [facility] [address]       Subscriber Name Subscriber Birth Date Member ID       SANGITA BRYSON 1/5/1933 [id number]                 Emergency Contacts      (Rel.) Home Phone Work Phone Mobile Phone    Griffin Bryson (Brother) [phone] -- --    Destin Bryson (Son) [phone] -- [phone]               History & Physical      Steff Hinojosa APRN at 6/2/2019 10:26 AM            [facility]  JAYNE Worthington M.D. Shauna Yadloski, APRN        Internal Medicine History and Physical      Name: Sangita Bryson  MRN: [id number]     Acct: [id number]  Room: [facility]/1    Admit Date: 6/1/2019  PCP: Floyd Peres MD    Chief Complaint:     Chief Complaint   Patient presents with   • Altered Mental Status       History Obtained From:     child, chart review and the " "patient    History of Present Illness:      Sangita Viramontes is a  86 y.o.  male who presents with   Chief Complaint   Patient presents with   • Altered Mental Status   The patient had been in his usual state of health when he developed acute confusion and a fall yesterday. The patient's son reports some worsening confusion over the past week with worsening ataxia. The patient suffered a hip fracture about 4 months ago and has had a gradual decline since that time, but had returned from SNF to independent living at home. The patient woke yesterday and made himself breakfast as he typically does. He had apparently forgotten he had family visiting from [place] and when they got to his house, he went to his bedroom to change clothes. Family was communicating through the door with the patient who reported that he was fine and was \"trying.\" He did not tell them that he had fallen in the floor. The door was apparently locked and after an hour, family had to break through the door where the found him in the floor with worsening confusion. The patient was brought to ER for evaluation and treatment. He was found to have confusion, but workup was negative. Family did not feel comfortable with the patient returning to home and he was admitted to our service. This morning, on direct questioning, the patient is alert and oriented to person, place and time, but cannot recall the events leading to hospitalization and he has no specific complaints.     Past Medical History:     Past Medical History:   Diagnosis Date   • A-fib (CMS/[facility])    • Hypertension    • PAD (peripheral artery disease) (CMS/[facility])    • Sleep apnea    • Varicose vein of leg         Past Surgical History:     Past Surgical History:   Procedure Laterality Date   • CATARACT EXTRACTION Bilateral    • CHOLECYSTECTOMY     • HIP SURGERY  02/06/2019    partial left hip replacement   • LEG SURGERY     • TOTAL KNEE ARTHROPLASTY Left    • TOTAL KNEE ARTHROPLASTY " Right         Medications Prior to Admission:       Prior to Admission medications    Medication Sig Start Date End Date Taking? Authorizing Provider   acetaminophen (TYLENOL) 500 MG tablet Take 500 mg by mouth Every 6 (Six) Hours As Needed for Mild Pain .   Yes Alexa Hernandez MD   diphenhydrAMINE (BENADRYL) 25 mg capsule Take 25 mg by mouth Every 6 (Six) Hours As Needed for Itching.   Yes Alexa Hernandez MD   irbesartan (AVAPRO) 150 MG tablet  5/7/18  Yes Alexa Hernandez MD   Multiple Vitamins-Minerals (VISION-JUVENTINO PRESERVE PO) Take  by mouth.   Yes Alexa Hernandez MD   Omega-3 Fatty Acids (FISH OIL) 1000 MG capsule capsule Take 1,000 mg by mouth Daily With Breakfast.   Yes Alexa Hernandez MD   vitamin B-12 (CYANOCOBALAMIN) 100 MCG tablet Take 50 mcg by mouth Daily.   Yes Alexa Hernandez MD   vitamin E 100 UNIT capsule Take 400 Units by mouth Daily.   Yes Alxea Hernandez MD   warfarin (COUMADIN) 5 MG tablet Take 5 mg by mouth Every Other Day. 7/18/18  Yes Alexa Hernandez MD   warfarin (COUMADIN) 7.5 MG tablet Take 7.5 mg by mouth Every Other Day.   Yes Alexa Hernandez MD   FUROSEMIDE PO Take  by mouth Daily.  6/2/19  Alexa Hernandez MD   Glucosamine-Chondroit-Vit C-Mn (GLUCOSAMINE 1500 COMPLEX PO) Take  by mouth.  6/2/19  Alexa Hernandez MD   lisinopril-hydrochlorothiazide (PRINZIDE,ZESTORETIC) 20-12.5 MG per tablet Take 1 tablet by mouth Daily.  6/2/19  Alexa Hernandez MD        Allergies:       Pradaxa [dabigatran etexilate mesylate] and Other    Social History:     Tobacco:    reports that he has quit smoking. He has quit using smokeless tobacco. His smokeless tobacco use included chew.  Alcohol:      reports that he does not drink alcohol.  Drug Use:  reports that he does not use drugs.    Family History:     Family History   Problem Relation Age of Onset   • Heart attack Mother    • Heart failure Father        Review of Systems:  "    Review of Systems   Constitution: Positive for malaise/fatigue. Negative for chills, decreased appetite, weakness, weight gain and weight loss.   HENT: Negative for congestion, ear discharge, hoarse voice and tinnitus.    Eyes: Negative for blurred vision, discharge, visual disturbance and visual halos.   Cardiovascular: Negative for chest pain, claudication, dyspnea on exertion, irregular heartbeat, leg swelling, orthopnea and paroxysmal nocturnal dyspnea.   Respiratory: Negative for cough, shortness of breath, sputum production and wheezing.    Endocrine: Negative for cold intolerance, heat intolerance and polyuria.   Hematologic/Lymphatic: Negative for adenopathy. Does not bruise/bleed easily.   Skin: Negative for dry skin, itching and suspicious lesions.   Musculoskeletal: Positive for falls. Negative for arthritis, back pain, joint pain, muscle weakness and myalgias.   Gastrointestinal: Negative for abdominal pain, constipation, diarrhea, dysphagia and hematemesis.   Genitourinary: Negative for bladder incontinence, dysuria and frequency.   Neurological: Positive for disturbances in coordination. Negative for aphonia and dizziness.   Psychiatric/Behavioral: Positive for altered mental status and memory loss. Negative for depression and substance abuse. The patient does not have insomnia and is not nervous/anxious.        Code Status:    There are no questions and answers to display.       Physical Exam:     Vitals:  BP (!) 176/118   Pulse 84   Temp 98.2 °F (36.8 °C) (Oral)   Resp 16   Ht 182.9 cm (72\")   Wt 96.1 kg (211 lb 14.4 oz)   SpO2 96%   BMI 28.74 kg/m²    Temp (24hrs), Av.1 °F (36.7 °C), Min:97.4 °F (36.3 °C), Max:98.6 °F (37 °C)    Physical Exam   Constitutional: He is oriented to person, place, and time. He appears well-developed and well-nourished.   HENT:   Head: Normocephalic and atraumatic.   Nose: Nose normal.   Mouth/Throat: Oropharynx is clear and moist.   Eyes: EOM are normal. " Pupils are equal, round, and reactive to light.   Neck: Normal range of motion. Neck supple.   Cardiovascular: Normal rate, normal heart sounds and intact distal pulses. An irregularly irregular rhythm present.   Pulmonary/Chest: Effort normal and breath sounds normal.   Abdominal: Soft. Bowel sounds are normal.   Musculoskeletal: Normal range of motion.   Generalized weakness   Neurological: He is alert and oriented to person, place, and time. He has normal reflexes.   Oriented to person, place and time, but having trouble with short term recall and events leading to hospitalization   Skin: Skin is warm and dry.   LLE old skin graft site with mild erythema   Psychiatric: He has a normal mood and affect. His behavior is normal.   Nursing note and vitals reviewed.      Data:     Lab Results (last 7 days)     Procedure Component Value Units Date/Time    Urinalysis With Culture If Indicated - Urine, Clean Catch [[id number]]  (Abnormal) Collected:  06/01/19 1728    Specimen:  Urine, Clean Catch Updated:  06/01/19 1741     Color, UA Yellow     Appearance, UA Clear     pH, UA 7.0     Specific Gravity, UA 1.016     Glucose, UA Negative     Ketones, UA Trace     Bilirubin, UA Negative     Blood, UA Negative     Protein, UA Trace     Leuk Esterase, UA Negative     Nitrite, UA Negative     Urobilinogen, UA 1.0 E.U./dL    Narrative:       Urine microscopic not indicated.    Blood Culture - Blood, Arm, Left [[id number]] Collected:  06/01/19 1715    Specimen:  Blood from Arm, Left Updated:  06/01/19 1736    Procalcitonin [[id number]]  (Normal) Collected:  06/01/19 1553    Specimen:  Blood from Arm, Right Updated:  06/01/19 1706     Procalcitonin <0.25 ng/mL     Narrative:       SIRS, sepsis, severe sepsis, and septic shock are categorized according to the criteria of the consensus conference of the American College of Chest Physicians/Society of Critical Care Medicine.    PCT < 0.5 ng/mL     Systemic infection (sepsis) is not  likely.    PCT >0.5 and < 2.0 ng/mL Systemic infection (sepsis) is possible, but other conditions are known to elevate PCT as well.    PCT > 2.0 ng/mL     Systemic infection (sepsis) is likely, unless other causes are known.      PCT > 10.0 ng/mL    Important systemic inflammatory response, almost exclusively due to severe bacterial sepsis or septic shock.    PCT values of < 0.5 ng/mL do not exclude an infection, because localized infections (without systemic signs) may be associated with such low concentrations, or a systemic infection in its initial stages (<6 hours).  Increased PCT can occur without infection.  PCT concentrations between 0.5 and 2.0 ng/mL should be interpreted taking into account the patients history.  It is recommended to retest PCT within 6-24 hours if any concentrations < 2.0 ng/mL are obtained.    [place] Draw [[id number]] Collected:  06/01/19 1553    Specimen:  Blood Updated:  06/01/19 1701    Narrative:       The following orders were created for panel order [place] Draw.  Procedure                               Abnormality         Status                     ---------                               -----------         ------                     Light Blue Top[[id number]]                                   Final result               Green Top (Gel)[[id number]]                                  Final result               Lavender Top[[id number]]                                     Final result               Red Top[[id number]]                                          Final result               [place] Blood Culture [place]...[[id number]]                      Final result                 Please view results for these tests on the individual orders.    Red Top [[id number]] Collected:  06/01/19 1553    Specimen:  Blood from Arm, Right Updated:  06/01/19 1701     Extra Tube Hold for add-ons.     Comment: Auto resulted.       [place] Blood Culture Bottle Set [[id number]] Collected:  06/01/19 1553    Specimen:   Blood from Arm, Right Updated:  06/01/19 1701     Extra Tube Hold for add-ons.     Comment: Auto resulted.       Light Blue Top [[id number]] Collected:  06/01/19 1553    Specimen:  Blood from Arm, Right Updated:  06/01/19 1701     Extra Tube hold for add-on     Comment: Auto resulted       Green Top (Gel) [[id number]] Collected:  06/01/19 1553    Specimen:  Blood from Arm, Right Updated:  06/01/19 1701     Extra Tube Hold for add-ons.     Comment: Auto resulted.       Lavender Top [[id number]] Collected:  06/01/19 1553    Specimen:  Blood from Arm, Right Updated:  06/01/19 1701     Extra Tube hold for add-on     Comment: Auto resulted       TSH [[id number]]  (Normal) Collected:  06/01/19 1553    Specimen:  Blood from Arm, Right Updated:  06/01/19 1658     TSH 1.390 mIU/mL     Troponin [[id number]]  (Normal) Collected:  06/01/19 1553    Specimen:  Blood from Arm, Right Updated:  06/01/19 1646     Troponin I <0.012 ng/mL     BNP [[id number]]  (Normal) Collected:  06/01/19 1553    Specimen:  Blood from Arm, Right Updated:  06/01/19 1646     proBNP 949.0 pg/mL     Magnesium [[id number]]  (Normal) Collected:  06/01/19 1553    Specimen:  Blood from Arm, Right Updated:  06/01/19 1635     Magnesium 2.0 mg/dL      Comment: Specimen hemolyzed.  Results may be affected.       Comprehensive Metabolic Panel [[id number]]  (Abnormal) Collected:  06/01/19 1553    Specimen:  Blood from Arm, Right Updated:  06/01/19 1635     Glucose 95 mg/dL      BUN 12 mg/dL      Creatinine 0.80 mg/dL      Sodium 141 mmol/L      Potassium 3.9 mmol/L      Comment: Specimen hemolyzed.  Results may be affected.        Chloride 106 mmol/L      CO2 29.0 mmol/L      Calcium 9.1 mg/dL      Total Protein 6.5 g/dL      Albumin 3.80 g/dL      ALT (SGPT) 25 U/L      Comment: Specimen hemolyzed.  Results may be affected.        AST (SGOT) 33 U/L      Comment: Specimen hemolyzed.  Results may be affected.        Alkaline Phosphatase 135 U/L      Comment: Specimen  hemolyzed. Results may be affected.        Total Bilirubin 0.9 mg/dL      eGFR Non African Amer 92 mL/min/1.73      Globulin 2.7 gm/dL      A/G Ratio 1.4 g/dL      BUN/Creatinine Ratio 15.0     Anion Gap 6.0 mmol/L     Narrative:       GFR Normal >60  Chronic Kidney Disease <60  Kidney Failure <15    Lipase [[id number]]  (Normal) Collected:  06/01/19 1553    Specimen:  Blood from Arm, Right Updated:  06/01/19 1634     Lipase 45 U/L     Blood Culture - Blood, Arm, Right [[id number]] Collected:  06/01/19 1553    Specimen:  Blood from Arm, Right Updated:  06/01/19 1631    aPTT [[id number]]  (Normal) Collected:  06/01/19 1553    Specimen:  Blood from Arm, Right Updated:  06/01/19 1623     PTT 34.2 seconds     Protime-INR [[id number]]  (Abnormal) Collected:  06/01/19 1553    Specimen:  Blood from Arm, Right Updated:  06/01/19 1623     Protime 17.1 Seconds      INR 1.35    Lactic Acid, Plasma [[id number]]  (Normal) Collected:  06/01/19 1553    Specimen:  Blood from Arm, Right Updated:  06/01/19 1622     Lactate 1.3 mmol/L     CBC Auto Differential [[id number]]  (Abnormal) Collected:  06/01/19 1553    Specimen:  Blood from Arm, Right Updated:  06/01/19 1620     WBC 6.91 10*3/mm3      RBC 5.27 10*6/mm3      Hemoglobin 14.8 g/dL      Hematocrit 45.1 %      MCV 85.6 fL      MCH 28.1 pg      MCHC 32.8 g/dL      RDW 13.3 %      RDW-SD 41.5 fl      MPV 11.0 fL      Platelets 165 10*3/mm3      Neutrophil % 66.1 %      Lymphocyte % 20.5 %      Monocyte % 9.8 %      Eosinophil % 2.6 %      Basophil % 0.6 %      Immature Grans % 0.4 %      Neutrophils, Absolute 4.56 10*3/mm3      Lymphocytes, Absolute 1.42 10*3/mm3      Monocytes, Absolute 0.68 10*3/mm3      Eosinophils, Absolute 0.18 10*3/mm3      Basophils, Absolute 0.04 10*3/mm3      Immature Grans, Absolute 0.03 10*3/mm3      nRBC 0.0 /100 WBC     POC Glucose Once [[id number]]  (Normal) Collected:  06/01/19 1547    Specimen:  Blood Updated:  06/01/19 1601     Glucose 87 mg/dL       Comment: : [id number] Oliver Perales ID: [id number]           Ct Head Without Contrast    Result Date: 6/1/2019  Narrative: CT HEAD WO CONTRAST- 6/1/2019 4:55 PM CDT  HISTORY: ams  COMPARISON: None  DOSE LENGTH PRODUCT: 664 mGy cm. Automated exposure control was also utilized to decrease patient radiation dose.  TECHNIQUE: Helical tomographic images of the brain were obtained without the use of contrast. Multiplanar reformatted images were provided for review.  FINDINGS: The midline structures are nondisplaced. There is moderate cerebral and cerebellar atrophy, with an associated increase in the prominence of the ventricles and sulci. The basilar cisterns are normal in size and configuration. There is no evidence of intracranial hemorrhage or mass-effect. There is low attenuation in the periventricular white matter, consistent with chronic ischemic change. 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 There are no abnormal extra-axial fluid collections. There is no evidence of tonsillar herniation.  The included orbits and their contents are unremarkable. The visualized paranasal sinuses, mastoid air cells and middle ear cavities are clear. The visualized osseous structures and overlying soft tissues of the skull and face are intact.      Impression: Moderate cerebral and cerebellar atrophy with chronic microvascular disease but no evidence of acute intracranial process.   This report was finalized on 06/01/2019 17:05 by Dr. Elliott Rm MD.    Xr Chest 1 View    Result Date: 6/1/2019  Narrative: EXAMINATION: XR CHEST 1 VW-  6/1/2019 4:10 PM CDT  HISTORY: Altered mental status.  One view chest x-ray compared to 07/13/2016.  Magnified heart size. Chronic interstitial lung disease with hypoaeration. No evidence of pneumonia or pneumothorax.  High-grade degenerative changes seen of both shoulder joints.  Summary: 1. Chronic interstitial lung disease and patchy atelectasis. No sign of pneumonia.   This report was finalized on 06/01/2019 16:18 by Dr. Randy Eldridge MD.    Ct Angiogram Chest With Contrast    Result Date: 6/1/2019  Narrative: CT ANGIOGRAM CHEST W CONTRAST- 6/1/2019 7:23 PM CDT  HISTORY: Tachycardia, SOA, unilateral leg swelling  COMPARISON: None.  DOSE LENGTH PRODUCT: 454 mGy cm. Automated exposure control was also utilized to decrease patient radiation dose.  TECHNIQUE: Helical tomographic images of the chest were obtained after the administration of intravenous contrast following angiogram protocol. Additionally, 3D and multiplanar reformatted images were provided.    FINDINGS:  Pulmonary arteries: There is adequate enhancement of the pulmonary arteries to evaluate for central and segmental pulmonary emboli. There are no filling defects within the main, lobar, segmental or visualized subsegmental pulmonary arteries. The pulmonary arteries are enlarged, consistent with pulmonary arterial hypertension.  Aorta and great vessels: The ascending aorta measures 4.3 cm in diameter. There is mild atherosclerosis. There is atherosclerosis in the great vessels. Coronary artery calcifications are present.  Visualized neck base: The imaged portion of the base of the neck appears unremarkable.  Lungs: Bilateral pleural effusions are noted. Mild interlobular septal thickening is seen in the lung bases. The lungs are hypoinflated. The trachea and bronchial tree are patent.  Heart: The heart is moderately enlarged. There is no pericardial effusion.  Mediastinum and lymph nodes: No enlarged mediastinal, hilar, or axillary lymph nodes are present.  Skeletal and soft tissues: The osseous structures of the thorax and surrounding soft tissues demonstrate no acute process. Degenerative changes are seen in the shoulders and spine.  Upper abdomen: The imaged portion of the upper abdomen demonstrates no acute process.      Impression: 1. Cardiomegaly and early pulmonary edema. 2. No evidence of pulmonary embolus. 3.  Pulmonary arterial hypertension. 4. Ectasia of the ascending thoracic aorta up to 4.3 cm.   This report was finalized on 2019 19:46 by Dr. Elliott Rm MD.        Assesment:           Altered mental status    Past Medical History:   Diagnosis Date   • A-fib (CMS/[facility])    • Hypertension    • PAD (peripheral artery disease) (CMS/[facility])    • Sleep apnea    • Varicose vein of leg        Plan:     1. Altered mental status  2. Ataxia  3. Atrial fibrillation  4. Chronic anticoagulation  5. Subtherapeutic INR  6. HTN    Continue current treatment. Monitor counts. Increase activity. Therapy evaluation. Consult neurology.  for discharge planning. Adjust coumadin dosing - will need to discuss RBA of anticoagulation with patient/family given recent falls. Blood pressure control. Check CK, Mg and b12,.       Electronically signed by CRYSTAL Morse on 2019 at 10:26 AM     Copy sent to Floyd Huerta MD      Electronically signed by Steff Hinojosa APRN at 2019 10:35 AM       Physician Progress Notes (most recent note)     No notes of this type exist for this encounter.        Consult Notes (most recent note)     No notes of this type exist for this encounter.           Physical Therapy Notes (most recent note)      Beny Allison, PT at 2019  2:01 PM  Version 1 of 1         Acute Care - Physical Therapy Initial Evaluation  [facility]     Patient Name: Sangita Viramontes  : 1933  MRN: [id number]  Today's Date: 2019   Onset of Illness/Injury or Date of Surgery: 19  Date of Referral to PT: 19  Referring Physician: CRYSTAL Thompson      Admit Date: 2019    Visit Dx:     ICD-10-CM ICD-9-CM   1. Altered mental status, unspecified altered mental status type R41.82 780.97   2. Subtherapeutic international normalized ratio (INR) R79.1 790.92   3. Decreased activities of daily living (ADL) R68.89 780.99   4. Impaired mobility Z74.09 799.89     Patient  Active Problem List   Diagnosis   • Hypertension   • Varicose veins of both legs with edema   • Paroxysmal atrial fibrillation (CMS/HCC)   • PAD (peripheral artery disease) (CMS/HCC)   • Antiplatelet or antithrombotic long-term use   • Anticoagulant long-term use   • Venous insufficiency (chronic) (peripheral)   • Altered mental status     Past Medical History:   Diagnosis Date   • A-fib (CMS/HCC)    • Hypertension    • PAD (peripheral artery disease) (CMS/HCC)    • Sleep apnea    • Varicose vein of leg      Past Surgical History:   Procedure Laterality Date   • CATARACT EXTRACTION Bilateral    • CHOLECYSTECTOMY     • HIP SURGERY  02/06/2019    partial left hip replacement   • LEG SURGERY     • TOTAL KNEE ARTHROPLASTY Left    • TOTAL KNEE ARTHROPLASTY Right         PT ASSESSMENT (last 12 hours)      Physical Therapy Evaluation     Row Name 06/02/19 1313          PT Evaluation Time/Intention    Subjective Information  no complaints  -     Document Type  evaluation  -     Mode of Treatment  physical therapy  -     Row Name 06/02/19 1313          General Information    Patient Profile Reviewed?  yes  -     Onset of Illness/Injury or Date of Surgery  06/01/19  -     Referring Physician  CRYSTAL Thompson  -     Patient Observations  alert;cooperative;agree to therapy  -     Patient/Family Observations  dtr in law in room  -     General Observations of Patient  fowlers in bed  -     Prior Level of Function  independent:;all household mobility;community mobility;cooking;cleaning;dependent:;driving  -     Equipment Currently Used at Home  cane, straight;walker, rolling;wheelchair;shower chair;ramp  -     Pertinent History of Current Functional Problem  confusion, found down in his bedroom, Dx:  AMS  -     Existing Precautions/Restrictions  fall  -     Limitations/Impairments  safety/cognitive  -     Risks Reviewed  patient:;LOB;nausea/vomiting;dizziness;increased discomfort  -     Benefits  Reviewed  patient:;improve function;increase independence;increase strength;increase balance  -     Barriers to Rehab  cognitive status  -[facility] Name 06/02/19 1313          Relationship/Environment    Primary Source of Support/Comfort  extended family;child(gildardo)  -     Lives With  alone  -[facility] Name 06/02/19 1313          Resource/Environmental Concerns    Current Living Arrangements  home/apartment/condo  -[facility] Name 06/02/19 1313          Cognitive Assessment/Interventions    Additional Documentation  Cognitive Assessment/Intervention (Group)  -[facility] Name 06/02/19 1313          Cognitive Assessment/Intervention- PT/OT    Affect/Mental Status (Cognitive)  confused  -     Orientation Status (Cognition)  oriented to;person;disoriented to;place;situation;time  -[facility] Name 06/02/19 1313          Safety Issues, Functional Mobility    Safety Issues Affecting Function (Mobility)  awareness of need for assistance;insight into deficits/self awareness  -     Impairments Affecting Function (Mobility)  balance;cognition  -[facility] Name 06/02/19 1313          Bed Mobility Assessment/Treatment    Supine-Sit [place] (Bed Mobility)  supervision  -     Assistive Device (Bed Mobility)  head of bed elevated;bed rails  -[facility] Name 06/02/19 1313          Transfer Assessment/Treatment    Sit-Stand [place] (Transfers)  verbal cues;minimum assist (75% patient effort)  -     Stand-Sit [place] (Transfers)  verbal cues;contact guard  -[facility] Name 06/02/19 1313          Gait/Stairs Assessment/Training    [place] Level (Gait)  verbal cues;contact guard  -     Assistive Device (Gait)  walker, front-wheeled  -     Distance in Feet (Gait)  40  -     Deviations/Abnormal Patterns (Gait)  gait speed decreased  -     Bilateral Gait Deviations  forward flexed posture  -[facility] Name 06/02/19 1313          General ROM    GENERAL ROM COMMENTS  WFL  -[facility] Name 06/02/19  1313          MMT (Manual Muscle Testing)    General MMT Comments  functionally 4/5  -     Row Name 06/02/19 1313          Pain Assessment    Additional Documentation  Pain Scale: Numbers Pre/Post-Treatment (Group)  -[facility] Name 06/02/19 1313          Pain Scale: Numbers Pre/Post-Treatment    Pain Scale: Numbers, Pretreatment  0/10 - no pain  -     Pain Intervention(s)  Medication (See MAR)  -     Row Name             Wound 06/02/19 0000 Left clavicle abrasion    Wound - Properties Group Date first assessed: 06/02/19  -AR Time first assessed: 0000  -AR Side: Left  -AR Location: clavicle  -AR Type: abrasion  -AR    Row Name 06/02/19 1313          Plan of Care Review    Plan of Care Reviewed With  patient;family  -[facility] Name 06/02/19 1313          Physical Therapy Clinical Impression    Date of Referral to PT  06/02/19  -     PT Diagnosis (PT Clinical Impression)  impaired mobility  -     Patient/Family Goals Statement (PT Clinical Impression)  return home  -     Criteria for Skilled Interventions Met (PT Clinical Impression)  yes;treatment indicated  -     Rehab Potential (PT Clinical Summary)  good, to achieve stated therapy goals  -     Predicted Duration of Therapy (PT)  until dc  -     Care Plan Review (PT)  evaluation/treatment results reviewed;risks/benefits reviewed;patient/other agree to care plan  -     Care Plan Review, Other Participant (PT Clinical Impression)  family  -[facility] Name 06/02/19 1313          Vital Signs    Post SpO2 (%)  98  -     O2 Delivery Post Treatment  room air  -     Post Patient Position  Sitting  -[facility] Name 06/02/19 1313          Physical Therapy Goals    Bed Mobility Goal Selection (PT)  bed mobility, PT goal 1  -     Transfer Goal Selection (PT)  transfer, PT goal 1  -     Gait Training Goal Selection (PT)  gait training, PT goal 1  -[facility] Name 06/02/19 1313          Bed Mobility Goal 1 (PT)    Activity/Assistive Device (Bed  Mobility Goal 1, PT)  bed mobility activities, all  -     [place] Level/Cues Needed (Bed Mobility Goal 1, PT)  standby assist  -     Time Frame (Bed Mobility Goal 1, PT)  by discharge  -     Progress/Outcomes (Bed Mobility Goal 1, PT)  goal [facility]     Row Name 06/02/19 1313          Transfer Goal 1 (PT)    Activity/Assistive Device (Transfer Goal 1, PT)  sit-to-stand/stand-to-sit;bed-to-chair/chair-to-bed;walker, rolling  -     [place] Level/Cues Needed (Transfer Goal 1, PT)  standby assist  -     Time Frame (Transfer Goal 1, PT)  by discharge  -     Progress/Outcome (Transfer Goal 1, PT)  goal ongoing  -     Row Name 06/02/19 1313          Gait Training Goal 1 (PT)    Activity/Assistive Device (Gait Training Goal 1, PT)  gait (walking locomotion);assistive device use  -     [place] Level (Gait Training Goal 1, PT)  standby assist  -     Distance (Gait Goal 1, PT)  40 feet  -     Time Frame (Gait Training Goal 1, PT)  by discharge  -     Progress/Outcome (Gait Training Goal 1, PT)  goal [facility]     Row Name 06/02/19 1313          Positioning and Restraints    Pre-Treatment Position  in bed  -     Post Treatment Position  chair  -     In Chair  sitting;call light within reach;encouraged to call for assist;with family/caregiver  -[facility] Name 06/02/19 1313          Living Environment    Home Accessibility  -- walk in shower  -       User Key  (r) = Recorded By, (t) = Taken By, (c) = Cosigned By    Initials Name Provider Type     Beny Allison, PT Physical Therapist    Shonna Salazar, RN Registered Nurse        Physical Therapy Education     Title: PT OT SLP Therapies (In Progress)     Topic: Physical Therapy (Done)     Point: Mobility training (Done)     Learning Progress Summary           Patient Acceptance, JOVANNA MCGREGOR DU, VU by  at 6/2/2019  1:56 PM    Comment:  benefits of PT and POC, call for assist OOB   Family Acceptance, JOVANNA MCGREGOR DU,LUIS by  at 6/2/2019   1:56 PM    Comment:  benefits of PT and POC, call for assist OOB                   Point: Precautions (Done)     Learning Progress Summary           Patient Acceptance, E,D, DU,VU by  at 6/2/2019  1:56 PM    Comment:  benefits of PT and POC, call for assist OOB   Family Acceptance, E,D, DU,VU by  at 6/2/2019  1:56 PM    Comment:  benefits of PT and POC, call for assist OOB                               User Key     Initials Effective Dates Name Provider Type Discipline     08/02/16 -  Beny Allison, PT Physical Therapist PT              PT Recommendation and Plan  Anticipated Discharge Disposition (PT): transitional care, skilled nursing facility  Planned Therapy Interventions (PT Eval): balance training, bed mobility training, gait training, home exercise program, patient/family education, strengthening, transfer training  Therapy Frequency (PT Clinical Impression): daily(1-2x/day)  Outcome Summary/Treatment Plan (PT)  Anticipated Discharge Disposition (PT): transitional care, skilled nursing facility  Plan of Care Reviewed With: patient, family  Outcome Summary: PT IE complete.  Pt required min assist to stand bedside.  Initial standing balance is poor, does improve during ambulation w/ RW.  Pt ambulated ~ 40 feet cg assist x1 w/ RW.  PT to address functional mobility deficits.  Pt lives alone.  Recommend transitional care vs SNF at [place].  Thank you for referral.  Outcome Measures     Row Name 06/02/19 1355 06/02/19 1300          How much help from another person do you currently need...    Turning from your back to your side while in flat bed without using bedrails?  4  -LH  --     Moving from lying on back to sitting on the side of a flat bed without bedrails?  3  -LH  --     Moving to and from a bed to a chair (including a wheelchair)?  3  -LH  --     Standing up from a chair using your arms (e.g., wheelchair, bedside chair)?  3  -LH  --     Climbing 3-5 steps with a railing?  3  -LH  --     To walk in  hospital room?  3  -LH  --     AM-PAC 6 Clicks Score  19  -LH  --        How much help from another is currently needed...    Putting on and taking off regular lower body clothing?  --  2  -TR     Bathing (including washing, rinsing, and drying)  --  2  -TR     Toileting (which includes using toilet bed pan or urinal)  --  3  -TR     Putting on and taking off regular upper body clothing  --  3  -TR     Taking care of personal grooming (such as brushing teeth)  --  3  -TR     Eating meals  --  4  -TR     Score  --  17  -TR        Functional Assessment    Outcome Measure Options  AM-PAC 6 Clicks Basic Mobility (PT)  -  AM-PAC 6 Clicks Daily Activity (OT)  -TR       User Key  (r) = Recorded By, (t) = Taken By, (c) = Cosigned By    Initials Name Provider Type     Beny Allison, PT Physical Therapist    TR Danita Davila, OTR/L Occupational Therapist         Time Calculation:   PT Charges     Row Name 19 1359             Time Calculation    Start Time  1313  -      Stop Time  1355  -      Time Calculation (min)  42 min  -      PT Received On  19  -      PT Goal Re-Cert Due Date  19  -        User Key  (r) = Recorded By, (t) = Taken By, (c) = Cosigned By    Initials Name Provider Type     Beny Allison, PT Physical Therapist        Therapy Charges for Today     Code Description Service Date Service Provider Modifiers Qty    [id number] HC PT EVAL MOD COMPLEXITY 3 2019 Beny Allison, PT GP 1          PT G-Codes  Outcome Measure Options: AM-PAC 6 Clicks Basic Mobility (PT)  AM-PAC 6 Clicks Score: 19  Score: 17      Beny Allison PT  2019         Electronically signed by Beny Allison, PT at 2019  2:01 PM          Occupational Therapy Notes (most recent note)      Danita Davila, OTR/L at 2019  1:59 PM          Acute Care - Occupational Therapy Initial Evaluation  [facility]     Patient Name: Sangita Viramontes  : 1933  MRN: [id number]  Today's Date:  6/2/2019  Onset of Illness/Injury or Date of Surgery: 06/01/19  Date of Referral to OT: 06/02/19  Referring Physician: CRYSTAL Thompson    Admit Date: 6/1/2019       ICD-10-CM ICD-9-CM   1. Altered mental status, unspecified altered mental status type R41.82 780.97   2. Subtherapeutic international normalized ratio (INR) R79.1 790.92   3. Decreased activities of daily living (ADL) R68.89 780.99     Patient Active Problem List   Diagnosis   • Hypertension   • Varicose veins of both legs with edema   • Paroxysmal atrial fibrillation (CMS/HCC)   • PAD (peripheral artery disease) (CMS/HCC)   • Antiplatelet or antithrombotic long-term use   • Anticoagulant long-term use   • Venous insufficiency (chronic) (peripheral)   • Altered mental status     Past Medical History:   Diagnosis Date   • A-fib (CMS/HCC)    • Hypertension    • PAD (peripheral artery disease) (CMS/HCC)    • Sleep apnea    • Varicose vein of leg      Past Surgical History:   Procedure Laterality Date   • CATARACT EXTRACTION Bilateral    • CHOLECYSTECTOMY     • HIP SURGERY  02/06/2019    partial left hip replacement   • LEG SURGERY     • TOTAL KNEE ARTHROPLASTY Left    • TOTAL KNEE ARTHROPLASTY Right           OT ASSESSMENT FLOWSHEET (last 12 hours)      Occupational Therapy Evaluation     Row Name 06/02/19 1100                   OT Evaluation Time/Intention    Subjective Information  no complaints  -TR        Document Type  evaluation  -TR        Mode of Treatment  occupational therapy  -TR        Patient Effort  good  -TR        Symptoms Noted During/After Treatment  none  -TR           General Information    Patient Profile Reviewed?  yes  -TR        Onset of Illness/Injury or Date of Surgery  06/01/19  -TR        Referring Physician  Dr. Peres  -TR        Patient Observations  alert;cooperative;agree to therapy  -TR        Patient/Family Observations  Son present  -TR        General Observations of Patient  Fowlers in bed, telemetry.   -TR         Prior Level of Function  independent:;gait;transfer;ADL's;cooking;cleaning;dependent:;driving  -TR        Equipment Currently Used at Home  cane, straight;walker, rolling;wheelchair Built in shower chair.   -TR        Pertinent History of Current Functional Problem  Brought by EMS to ER following increasing confusion over the past week with a fall at home and ataxia. CT Head and CXR negative for acute findings. Dx: AMS, subtherapeutic INR, ataxia, a-fib, HTN.   -TR        Existing Precautions/Restrictions  fall  -TR        Limitations/Impairments  safety/cognitive  -TR        Risks Reviewed  patient and family:;LOB;dizziness;increased discomfort;change in vital signs  -TR        Benefits Reviewed  patient and family:;improve function;increase independence;increase strength;increase balance;decrease pain;increase knowledge  -TR        Barriers to Rehab  cognitive status  -TR           Relationship/Environment    Lives With  alone  -TR        Family Caregiver if Needed  child(gildardo), adult  -TR           Resource/Environmental Concerns    Current Living Arrangements  home/apartment/condo  -TR           Cognitive Assessment/Intervention- PT/OT    Orientation Status (Cognition)  oriented to;person;time;disoriented to;place;situation  -TR        Follows Commands (Cognition)  follows one step commands;over 90% accuracy  -TR        Safety Deficit (Cognitive)  mild deficit  -TR           Bed Mobility Assessment/Treatment    Bed Mobility Assessment/Treatment  scooting/bridging;supine-sit;sit-supine  -TR        Scooting/[place]rid[place] (Bed Mobility)  moderate assist (50% patient effort)  -TR        Supine-Sit [place] (Bed Mobility)  contact guard;verbal cues  -TR        Sit-Supine [place] (Bed Mobility)  contact guard;verbal cues  -TR        Assistive Device (Bed Mobility)  bed rails;head of bed elevated  -TR           Functional Mobility    Functional Mobility- Ind. Level  contact guard assist  -TR         Functional Mobility- Device  rolling walker  -TR        Functional Mobility-Distance (Feet)  50  -TR           Transfer Assessment/Treatment    Transfer Assessment/Treatment  sit-stand transfer;stand-sit transfer  -TR           Sit-Stand Transfer    Sit-Stand [place] (Transfers)  contact guard;verbal cues  -TR        Assistive Device (Sit-Stand Transfers)  walker, front-wheeled  -TR           Stand-Sit Transfer    Stand-Sit [place] (Transfers)  contact guard;verbal cues  -TR        Assistive Device (Stand-Sit Transfers)  walker, front-wheeled  -TR           ADL Assessment/Intervention    BADL Assessment/Intervention  lower body dressing;toileting  -TR           Lower Body Dressing Assessment/Training    Lower Body Dressing [place] Level  don;socks;moderate assist (50% patient effort)  -TR        Lower Body Dressing Position  unsupported sitting  -TR           Toileting Assessment/Training    [place] Level (Toileting)  toileting skills;contact guard assist  -TR        Assistive Devices (Toileting)  grab bar/safety frame  -TR        Toileting Position  supported standing  -TR           BADL Safety/Performance    Impairments, BADL Safety/Performance  balance;endurance/activity tolerance;cognition;trunk/postural control;range of motion;strength;coordination  -TR           General ROM    GENERAL ROM COMMENTS  B shoulder flexion AROM 50% impaired. Distal B UE AROM WFL.   -TR           MMT (Manual Muscle Testing)    General MMT Comments  B UE 4/5 within available ROM.   -TR           Motor Assessment/Interventions    Additional Documentation  Gross Motor Coordination (Group)  -TR           Gross Motor Coordination    Gross Motor Skill, Impairments Detail  B UE and LE minimally impaired. No ataxia noted.   -TR           Sensory Assessment/Intervention    Sensory General Assessment  no sensation deficits identified  -TR           Positioning and Restraints    Pre-Treatment Position  in bed  -TR         Post Treatment Position  bed  -TR        In Bed  fowlers;call light within reach;encouraged to call for assist;exit alarm on;with family/caregiver;side rails up x2  -TR           Pain Assessment    Additional Documentation  Pain Scale: Numbers Pre/Post-Treatment (Group)  -TR           Pain Scale: Numbers Pre/Post-Treatment    Pain Scale: Numbers, Pretreatment  0/10 - no pain  -TR        Pain Scale: Numbers, Post-Treatment  0/10 - no pain  -TR           Wound 06/02/19 0000 Left clavicle abrasion    Wound - Properties Group Date first assessed: 06/02/19  -AR Time first assessed: 0000  -AR Side: Left  -AR Location: clavicle  -AR Type: abrasion  -AR       Plan of Care Review    Plan of Care Reviewed With  patient;son  -TR           Clinical Impression (OT)    Date of Referral to OT  06/02/19  -TR        OT Diagnosis  Decreased ADL  -TR        Patient/Family Goals Statement (OT Eval)  D/C to SNF if needed.   -TR        Criteria for Skilled Therapeutic Interventions Met (OT Eval)  yes;treatment indicated  -TR        Rehab Potential (OT Eval)  good, to achieve stated therapy goals  -TR        Therapy Frequency (OT Eval)  5 times/wk  -TR        Predicted Duration of Therapy Intervention (Therapy Eval)  10 days  -TR        Care Plan Review (OT)  evaluation/treatment results reviewed;care plan/treatment goals reviewed;risks/benefits reviewed;current/potential barriers reviewed;patient/other agree to care plan  -TR        Care Plan Review, Other Participant (OT Eval)  son  -TR        Anticipated Discharge Disposition (OT)  skilled nursing facility;transitional care;home with 24/7 care  -TR           Planned OT Interventions    Planned Therapy Interventions (OT Eval)  activity tolerance training;BADL retraining;functional balance retraining;transfer/mobility retraining;ROM/therapeutic exercise;strengthening exercise;patient/caregiver education/training;occupation/activity based interventions;IADL retraining  -TR           OT  Goals    Transfer Goal Selection (OT)  transfer, OT goal 1  -TR        Bathing Goal Selection (OT)  bathing, OT goal 1  -TR        Dressing Goal Selection (OT)  dressing, OT goal 1  -TR           Transfer Goal 1 (OT)    Activity/Assistive Device (Transfer Goal 1, OT)  sit-to-stand/stand-to-sit;bed-to-chair/chair-to-bed;toilet;shower chair  -TR        [place] Level/Cues Needed (Transfer Goal 1, OT)  supervision required  -TR        Time Frame (Transfer Goal 1, OT)  by discharge;long term goal (LTG)  -TR        Progress/Outcome (Transfer Goal 1, OT)  goal ongoing  -TR           Bathing Goal 1 (OT)    Activity/Assistive Device (Bathing Goal 1, OT)  upper body bathing;lower body bathing;shower chair  -TR        [place] Level/Cues Needed (Bathing Goal 1, OT)  contact guard assist  -TR        Time Frame (Bathing Goal 1, OT)  by discharge;long term goal (LTG)  -TR        Progress/Outcomes (Bathing Goal 1, OT)  goal ongoing  -TR           Dressing Goal 1 (OT)    Activity/Assistive Device (Dressing Goal 1, OT)  lower body dressing  -TR        [place]/Cues Needed (Dressing Goal 1, OT)  supervision required  -TR        Time Frame (Dressing Goal 1, OT)  by discharge;long term goal (LTG)  -TR        Progress/Outcome (Dressing Goal 1, OT)  goal ongoing  -TR           Living Environment    Home Accessibility  -- Ramp and walk in shower.   -TR          User Key  (r) = Recorded By, (t) = Taken By, (c) = Cosigned By    Initials Name Effective Dates    Shonna Salazar RN 08/02/16 -     Danita Deluca OTR/SUSAN 06/22/15 -          Occupational Therapy Education     Title: PT OT SLP Therapies (In Progress)     Topic: Occupational Therapy (In Progress)     Point: ADL training (Done)     Description: Instruct learner(s) on proper safety adaptation and remediation techniques during self care or transfers.   Instruct in proper use of assistive devices.    Learning Progress Summary           Patient Acceptance, E,D,  VU,NR by TR at 6/2/2019  1:54 PM    Comment:  Education provided on purpose of OT eval, impairments found, need for continued intervention and d/c planning.                   Point: Precautions (Done)     Description: Instruct learner(s) on prescribed precautions during self-care and functional transfers.    Learning Progress Summary           Patient Acceptance, E,D, VU,NR by TR at 6/2/2019  1:54 PM    Comment:  Education provided on purpose of OT eval, impairments found, need for continued intervention and d/c planning.                               User Key     Initials Effective Dates Name Provider Type Discipline    TR 06/22/15 -  Danita Davila, OTR/L Occupational Therapist OT                  OT Recommendation and Plan  Outcome Summary/Treatment Plan (OT)  Anticipated Discharge Disposition (OT): skilled nursing facility, transitional care, home with 24/7 care  Planned Therapy Interventions (OT Eval): activity tolerance training, BADL retraining, functional balance retraining, transfer/mobility retraining, ROM/therapeutic exercise, strengthening exercise, patient/caregiver education/training, occupation/activity based interventions, IADL retraining  Therapy Frequency (OT Eval): 5 times/wk  Plan of Care Review  Plan of Care Reviewed With: patient, son  Plan of Care Reviewed With: patient, son  Outcome Summary: OT Eval completed. Pt oriented to person and time only. He was mildly impulsive and required extra time to complete tasks and transfers. CGA to Min A for UB ADL. Mod A for LB ADL. CGA for bed mobility, transfers and functional mobility using a RW. Pt fatigues easily. Pt is a fall risk. OT will continue working with pt. Anticipated d/c is SNF for continued rehab, TCU or d/c home with 24 hour care and home health pending progress.     Outcome Measures     Row Name 06/02/19 1355 06/02/19 1300          How much help from another person do you currently need...    Turning from your back to your side while  in flat bed without using bedrails?  4  -LH  --     Moving from lying on back to sitting on the side of a flat bed without bedrails?  3  -LH  --     Moving to and from a bed to a chair (including a wheelchair)?  3  -LH  --     Standing up from a chair using your arms (e.g., wheelchair, bedside chair)?  3  -LH  --     Climbing 3-5 steps with a railing?  3  -LH  --     To walk in hospital room?  3  -LH  --     AM-PAC 6 Clicks Score  19  -LH  --        How much help from another is currently needed...    Putting on and taking off regular lower body clothing?  --  2  -TR     Bathing (including washing, rinsing, and drying)  --  2  -TR     Toileting (which includes using toilet bed pan or urinal)  --  3  -TR     Putting on and taking off regular upper body clothing  --  3  -TR     Taking care of personal grooming (such as brushing teeth)  --  3  -TR     Eating meals  --  4  -TR     Score  --  17  -TR        Functional Assessment    Outcome Measure Options  AM-PAC 6 Clicks Basic Mobility (PT)  -  AM-PAC 6 Clicks Daily Activity (OT)  -TR       User Key  (r) = Recorded By, (t) = Taken By, (c) = Cosigned By    Initials Name Provider Type     Beny Allison, PT Physical Therapist    TR Danita Davila OTR/L Occupational Therapist          Time Calculation:   Time Calculation- OT     Row Name 06/02/19 1358             Time Calculation- OT    OT Start Time  1105  -TR      OT Stop Time  1158  -TR      OT Time Calculation (min)  53 min  -TR      OT Received On  06/02/19  -TR      OT Goal Re-Cert Due Date  06/12/19  -TR        User Key  (r) = Recorded By, (t) = Taken By, (c) = Cosigned By    Initials Name Provider Type    Danita Deluca OTR/L Occupational Therapist        Therapy Charges for Today     Code Description Service Date Service Provider Modifiers Qty    [id number]  OT EVAL MOD COMPLEXITY 4 6/2/2019 Danita Davila OTR/L GO 1               CHAPARRO Doan/L  6/2/2019    Electronically signed  by Danita Davila, OTR/L at 6/2/2019  1:59 PM

## 2019-06-02 NOTE — H&P
"  [facility]  JAYNE Worthington M.D.  CRYSTAL Thompson        Internal Medicine History and Physical      Name: Sangita Viramontes  MRN: [id number]     Acct: [id number]  Room: [facility]    Admit Date: 6/1/2019  PCP: Floyd Peres MD    Chief Complaint:     Chief Complaint   Patient presents with   • Altered Mental Status       History Obtained From:     child, chart review and the patient    History of Present Illness:      Sangita Viramontes is a  86 y.o.  male who presents with   Chief Complaint   Patient presents with   • Altered Mental Status   The patient had been in his usual state of health when he developed acute confusion and a fall yesterday. The patient's son reports some worsening confusion over the past week with worsening ataxia. The patient suffered a hip fracture about 4 months ago and has had a gradual decline since that time, but had returned from SNF to independent living at home. The patient woke yesterday and made himself breakfast as he typically does. He had apparently forgotten he had family visiting from [place] and when they got to his house, he went to his bedroom to change clothes. Family was communicating through the door with the patient who reported that he was fine and was \"trying.\" He did not tell them that he had fallen in the floor. The door was apparently locked and after an hour, family had to break through the door where the found him in the floor with worsening confusion. The patient was brought to ER for evaluation and treatment. He was found to have confusion, but workup was negative. Family did not feel comfortable with the patient returning to home and he was admitted to our service. This morning, on direct questioning, the patient is alert and oriented to person, place and time, but cannot recall the events leading to hospitalization and he has no specific complaints.     Past Medical History:     Past Medical History: "   Diagnosis Date   • A-fib (CMS/[facility])    • Hypertension    • PAD (peripheral artery disease) (CMS/[facility])    • Sleep apnea    • Varicose vein of leg         Past Surgical History:     Past Surgical History:   Procedure Laterality Date   • CATARACT EXTRACTION Bilateral    • CHOLECYSTECTOMY     • HIP SURGERY  02/06/2019    partial left hip replacement   • LEG SURGERY     • TOTAL KNEE ARTHROPLASTY Left    • TOTAL KNEE ARTHROPLASTY Right         Medications Prior to Admission:       Prior to Admission medications    Medication Sig Start Date End Date Taking? Authorizing Provider   acetaminophen (TYLENOL) 500 MG tablet Take 500 mg by mouth Every 6 (Six) Hours As Needed for Mild Pain .   Yes Alexa Hernandez MD   diphenhydrAMINE (BENADRYL) 25 mg capsule Take 25 mg by mouth Every 6 (Six) Hours As Needed for Itching.   Yes Alexa Hernandez MD   irbesartan (AVAPRO) 150 MG tablet  5/7/18  Yes lAexa Hernandez MD   Multiple Vitamins-Minerals (VISION-JUVENTINO PRESERVE PO) Take  by mouth.   Yes Alexa Hernandez MD   Omega-3 Fatty Acids (FISH OIL) 1000 MG capsule capsule Take 1,000 mg by mouth Daily With Breakfast.   Yes Alexa Hernandez MD   vitamin B-12 (CYANOCOBALAMIN) 100 MCG tablet Take 50 mcg by mouth Daily.   Yes Alexa Hernandez MD   vitamin E 100 UNIT capsule Take 400 Units by mouth Daily.   Yes Alexa Hernandez MD   warfarin (COUMADIN) 5 MG tablet Take 5 mg by mouth Every Other Day. 7/18/18  Yes Alexa Hernandez MD   warfarin (COUMADIN) 7.5 MG tablet Take 7.5 mg by mouth Every Other Day.   Yes Alexa Hernandez MD   FUROSEMIDE PO Take  by mouth Daily.  6/2/19  Alexa Hernandez MD   Glucosamine-Chondroit-Vit C-Mn (GLUCOSAMINE 1500 COMPLEX PO) Take  by mouth.  6/2/19  Alexa Hernandez MD   lisinopril-hydrochlorothiazide (PRINZIDE,ZESTORETIC) 20-12.5 MG per tablet Take 1 tablet by mouth Daily.  6/2/19  Alexa Hernandez MD        Allergies:       Pradaxa [dabigatran  etexilate mesylate] and Other    Social History:     Tobacco:    reports that he has quit smoking. He has quit using smokeless tobacco. His smokeless tobacco use included chew.  Alcohol:      reports that he does not drink alcohol.  Drug Use:  reports that he does not use drugs.    Family History:     Family History   Problem Relation Age of Onset   • Heart attack Mother    • Heart failure Father        Review of Systems:     Review of Systems   Constitution: Positive for malaise/fatigue. Negative for chills, decreased appetite, weakness, weight gain and weight loss.   HENT: Negative for congestion, ear discharge, hoarse voice and tinnitus.    Eyes: Negative for blurred vision, discharge, visual disturbance and visual halos.   Cardiovascular: Negative for chest pain, claudication, dyspnea on exertion, irregular heartbeat, leg swelling, orthopnea and paroxysmal nocturnal dyspnea.   Respiratory: Negative for cough, shortness of breath, sputum production and wheezing.    Endocrine: Negative for cold intolerance, heat intolerance and polyuria.   Hematologic/Lymphatic: Negative for adenopathy. Does not bruise/bleed easily.   Skin: Negative for dry skin, itching and suspicious lesions.   Musculoskeletal: Positive for falls. Negative for arthritis, back pain, joint pain, muscle weakness and myalgias.   Gastrointestinal: Negative for abdominal pain, constipation, diarrhea, dysphagia and hematemesis.   Genitourinary: Negative for bladder incontinence, dysuria and frequency.   Neurological: Positive for disturbances in coordination. Negative for aphonia and dizziness.   Psychiatric/Behavioral: Positive for altered mental status and memory loss. Negative for depression and substance abuse. The patient does not have insomnia and is not nervous/anxious.        Code Status:    There are no questions and answers to display.       Physical Exam:     Vitals:  BP (!) 176/118   Pulse 84   Temp 98.2 °F (36.8 °C) (Oral)   Resp 16    "Ht 182.9 cm (72\")   Wt 96.1 kg (211 lb 14.4 oz)   SpO2 96%   BMI 28.74 kg/m²   Temp (24hrs), Av.1 °F (36.7 °C), Min:97.4 °F (36.3 °C), Max:98.6 °F (37 °C)    Physical Exam   Constitutional: He is oriented to person, place, and time. He appears well-developed and well-nourished.   HENT:   Head: Normocephalic and atraumatic.   Nose: Nose normal.   Mouth/Throat: Oropharynx is clear and moist.   Eyes: EOM are normal. Pupils are equal, round, and reactive to light.   Neck: Normal range of motion. Neck supple.   Cardiovascular: Normal rate, normal heart sounds and intact distal pulses. An irregularly irregular rhythm present.   Pulmonary/Chest: Effort normal and breath sounds normal.   Abdominal: Soft. Bowel sounds are normal.   Musculoskeletal: Normal range of motion.   Generalized weakness   Neurological: He is alert and oriented to person, place, and time. He has normal reflexes.   Oriented to person, place and time, but having trouble with short term recall and events leading to hospitalization   Skin: Skin is warm and dry.   LLE old skin graft site with mild erythema   Psychiatric: He has a normal mood and affect. His behavior is normal.   Nursing note and vitals reviewed.      Data:     Lab Results (last 7 days)     Procedure Component Value Units Date/Time    Urinalysis With Culture If Indicated - Urine, Clean Catch [[id number]]  (Abnormal) Collected:  19 172    Specimen:  Urine, Clean Catch Updated:  19 1741     Color, UA Yellow     Appearance, UA Clear     pH, UA 7.0     Specific Gravity, UA 1.016     Glucose, UA Negative     Ketones, UA Trace     Bilirubin, UA Negative     Blood, UA Negative     Protein, UA Trace     Leuk Esterase, UA Negative     Nitrite, UA Negative     Urobilinogen, UA 1.0 E.U./dL    Narrative:       Urine microscopic not indicated.    Blood Culture - Blood, Arm, Left [[id number]] Collected:  19 1715    Specimen:  Blood from Arm, Left Updated:  19 1736    " Procalcitonin [[id number]]  (Normal) Collected:  06/01/19 1553    Specimen:  Blood from Arm, Right Updated:  06/01/19 1706     Procalcitonin <0.25 ng/mL     Narrative:       SIRS, sepsis, severe sepsis, and septic shock are categorized according to the criteria of the consensus conference of the American College of Chest Physicians/Society of Critical Care Medicine.    PCT < 0.5 ng/mL     Systemic infection (sepsis) is not likely.    PCT >0.5 and < 2.0 ng/mL Systemic infection (sepsis) is possible, but other conditions are known to elevate PCT as well.    PCT > 2.0 ng/mL     Systemic infection (sepsis) is likely, unless other causes are known.      PCT > 10.0 ng/mL    Important systemic inflammatory response, almost exclusively due to severe bacterial sepsis or septic shock.    PCT values of < 0.5 ng/mL do not exclude an infection, because localized infections (without systemic signs) may be associated with such low concentrations, or a systemic infection in its initial stages (<6 hours).  Increased PCT can occur without infection.  PCT concentrations between 0.5 and 2.0 ng/mL should be interpreted taking into account the patients history.  It is recommended to retest PCT within 6-24 hours if any concentrations < 2.0 ng/mL are obtained.    [place] Draw [[id number]] Collected:  06/01/19 1553    Specimen:  Blood Updated:  06/01/19 1701    Narrative:       The following orders were created for panel order [place] Draw.  Procedure                               Abnormality         Status                     ---------                               -----------         ------                     Light Blue Top[[id number]]                                   Final result               Green Top (Gel)[[id number]]                                  Final result               Lavender Top[[id number]]                                     Final result               Red Top[[id number]]                                          Final result                [place] Blood Culture [place]...[[id number]]                      Final result                 Please view results for these tests on the individual orders.    Red Top [[id number]] Collected:  06/01/19 1553    Specimen:  Blood from Arm, Right Updated:  06/01/19 1701     Extra Tube Hold for add-ons.     Comment: Auto resulted.       [place] Blood Culture Bottle Set [[id number]] Collected:  06/01/19 1553    Specimen:  Blood from Arm, Right Updated:  06/01/19 1701     Extra Tube Hold for add-ons.     Comment: Auto resulted.       Light Blue Top [[id number]] Collected:  06/01/19 1553    Specimen:  Blood from Arm, Right Updated:  06/01/19 1701     Extra Tube hold for add-on     Comment: Auto resulted       Green Top (Gel) [[id number]] Collected:  06/01/19 1553    Specimen:  Blood from Arm, Right Updated:  06/01/19 1701     Extra Tube Hold for add-ons.     Comment: Auto resulted.       Lavender Top [[id number]] Collected:  06/01/19 1553    Specimen:  Blood from Arm, Right Updated:  06/01/19 1701     Extra Tube hold for add-on     Comment: Auto resulted       TSH [[id number]]  (Normal) Collected:  06/01/19 1553    Specimen:  Blood from Arm, Right Updated:  06/01/19 1658     TSH 1.390 mIU/mL     Troponin [[id number]]  (Normal) Collected:  06/01/19 1553    Specimen:  Blood from Arm, Right Updated:  06/01/19 1646     Troponin I <0.012 ng/mL     BNP [[id number]]  (Normal) Collected:  06/01/19 1553    Specimen:  Blood from Arm, Right Updated:  06/01/19 1646     proBNP 949.0 pg/mL     Magnesium [[id number]]  (Normal) Collected:  06/01/19 1553    Specimen:  Blood from Arm, Right Updated:  06/01/19 1635     Magnesium 2.0 mg/dL      Comment: Specimen hemolyzed.  Results may be affected.       Comprehensive Metabolic Panel [[id number]]  (Abnormal) Collected:  06/01/19 1553    Specimen:  Blood from Arm, Right Updated:  06/01/19 1635     Glucose 95 mg/dL      BUN 12 mg/dL      Creatinine 0.80 mg/dL      Sodium 141 mmol/L      Potassium 3.9  mmol/L      Comment: Specimen hemolyzed.  Results may be affected.        Chloride 106 mmol/L      CO2 29.0 mmol/L      Calcium 9.1 mg/dL      Total Protein 6.5 g/dL      Albumin 3.80 g/dL      ALT (SGPT) 25 U/L      Comment: Specimen hemolyzed.  Results may be affected.        AST (SGOT) 33 U/L      Comment: Specimen hemolyzed.  Results may be affected.        Alkaline Phosphatase 135 U/L      Comment: Specimen hemolyzed. Results may be affected.        Total Bilirubin 0.9 mg/dL      eGFR Non African Amer 92 mL/min/1.73      Globulin 2.7 gm/dL      A/G Ratio 1.4 g/dL      BUN/Creatinine Ratio 15.0     Anion Gap 6.0 mmol/L     Narrative:       GFR Normal >60  Chronic Kidney Disease <60  Kidney Failure <15    Lipase [[id number]]  (Normal) Collected:  06/01/19 1553    Specimen:  Blood from Arm, Right Updated:  06/01/19 1634     Lipase 45 U/L     Blood Culture - Blood, Arm, Right [[id number]] Collected:  06/01/19 1553    Specimen:  Blood from Arm, Right Updated:  06/01/19 1631    aPTT [[id number]]  (Normal) Collected:  06/01/19 1553    Specimen:  Blood from Arm, Right Updated:  06/01/19 1623     PTT 34.2 seconds     Protime-INR [[id number]]  (Abnormal) Collected:  06/01/19 1553    Specimen:  Blood from Arm, Right Updated:  06/01/19 1623     Protime 17.1 Seconds      INR 1.35    Lactic Acid, Plasma [[id number]]  (Normal) Collected:  06/01/19 1553    Specimen:  Blood from Arm, Right Updated:  06/01/19 1622     Lactate 1.3 mmol/L     CBC Auto Differential [[id number]]  (Abnormal) Collected:  06/01/19 1553    Specimen:  Blood from Arm, Right Updated:  06/01/19 1620     WBC 6.91 10*3/mm3      RBC 5.27 10*6/mm3      Hemoglobin 14.8 g/dL      Hematocrit 45.1 %      MCV 85.6 fL      MCH 28.1 pg      MCHC 32.8 g/dL      RDW 13.3 %      RDW-SD 41.5 fl      MPV 11.0 fL      Platelets 165 10*3/mm3      Neutrophil % 66.1 %      Lymphocyte % 20.5 %      Monocyte % 9.8 %      Eosinophil % 2.6 %      Basophil % 0.6 %      Immature Grans %  0.4 %      Neutrophils, Absolute 4.56 10*3/mm3      Lymphocytes, Absolute 1.42 10*3/mm3      Monocytes, Absolute 0.68 10*3/mm3      Eosinophils, Absolute 0.18 10*3/mm3      Basophils, Absolute 0.04 10*3/mm3      Immature Grans, Absolute 0.03 10*3/mm3      nRBC 0.0 /100 WBC     POC Glucose Once [[id number]]  (Normal) Collected:  06/01/19 1547    Specimen:  Blood Updated:  06/01/19 1601     Glucose 87 mg/dL      Comment: : [id number] Oliver CheungMeter ID: [id number]           Ct Head Without Contrast    Result Date: 6/1/2019  Narrative: CT HEAD WO CONTRAST- 6/1/2019 4:55 PM CDT  HISTORY: ams  COMPARISON: None  DOSE LENGTH PRODUCT: 664 mGy cm. Automated exposure control was also utilized to decrease patient radiation dose.  TECHNIQUE: Helical tomographic images of the brain were obtained without the use of contrast. Multiplanar reformatted images were provided for review.  FINDINGS: The midline structures are nondisplaced. There is moderate cerebral and cerebellar atrophy, with an associated increase in the prominence of the ventricles and sulci. The basilar cisterns are normal in size and configuration. There is no evidence of intracranial hemorrhage or mass-effect. There is low attenuation in the periventricular white matter, consistent with chronic ischemic change. 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 There are no abnormal extra-axial fluid collections. There is no evidence of tonsillar herniation.  The included orbits and their contents are unremarkable. The visualized paranasal sinuses, mastoid air cells and middle ear cavities are clear. The visualized osseous structures and overlying soft tissues of the skull and face are intact.      Impression: Moderate cerebral and cerebellar atrophy with chronic microvascular disease but no evidence of acute intracranial process.   This report was finalized on 06/01/2019 17:05 by Dr. Elliott Rm MD.    Xr Chest 1 View    Result Date:  6/1/2019  Narrative: EXAMINATION: XR CHEST 1 VW-  6/1/2019 4:10 PM CDT  HISTORY: Altered mental status.  One view chest x-ray compared to 07/13/2016.  Magnified heart size. Chronic interstitial lung disease with hypoaeration. No evidence of pneumonia or pneumothorax.  High-grade degenerative changes seen of both shoulder joints.  Summary: 1. Chronic interstitial lung disease and patchy atelectasis. No sign of pneumonia.  This report was finalized on 06/01/2019 16:18 by Dr. Randy Eldridge MD.    Ct Angiogram Chest With Contrast    Result Date: 6/1/2019  Narrative: CT ANGIOGRAM CHEST W CONTRAST- 6/1/2019 7:23 PM CDT  HISTORY: Tachycardia, SOA, unilateral leg swelling  COMPARISON: None.  DOSE LENGTH PRODUCT: 454 mGy cm. Automated exposure control was also utilized to decrease patient radiation dose.  TECHNIQUE: Helical tomographic images of the chest were obtained after the administration of intravenous contrast following angiogram protocol. Additionally, 3D and multiplanar reformatted images were provided.    FINDINGS:  Pulmonary arteries: There is adequate enhancement of the pulmonary arteries to evaluate for central and segmental pulmonary emboli. There are no filling defects within the main, lobar, segmental or visualized subsegmental pulmonary arteries. The pulmonary arteries are enlarged, consistent with pulmonary arterial hypertension.  Aorta and great vessels: The ascending aorta measures 4.3 cm in diameter. There is mild atherosclerosis. There is atherosclerosis in the great vessels. Coronary artery calcifications are present.  Visualized neck base: The imaged portion of the base of the neck appears unremarkable.  Lungs: Bilateral pleural effusions are noted. Mild interlobular septal thickening is seen in the lung bases. The lungs are hypoinflated. The trachea and bronchial tree are patent.  Heart: The heart is moderately enlarged. There is no pericardial effusion.  Mediastinum and lymph nodes: No enlarged  mediastinal, hilar, or axillary lymph nodes are present.  Skeletal and soft tissues: The osseous structures of the thorax and surrounding soft tissues demonstrate no acute process. Degenerative changes are seen in the shoulders and spine.  Upper abdomen: The imaged portion of the upper abdomen demonstrates no acute process.      Impression: 1. Cardiomegaly and early pulmonary edema. 2. No evidence of pulmonary embolus. 3. Pulmonary arterial hypertension. 4. Ectasia of the ascending thoracic aorta up to 4.3 cm.   This report was finalized on 06/01/2019 19:46 by Dr. Elliott Rm MD.        Assesment:           Altered mental status    Past Medical History:   Diagnosis Date   • A-fib (CMS/HCC)    • Hypertension    • PAD (peripheral artery disease) (CMS/[facility])    • Sleep apnea    • Varicose vein of leg        Plan:     1. Altered mental status  2. Ataxia  3. Atrial fibrillation  4. Chronic anticoagulation  5. Subtherapeutic INR  6. HTN    Continue current treatment. Monitor counts. Increase activity. Therapy evaluation. Consult neurology.  for discharge planning. Adjust coumadin dosing - will need to discuss RBA of anticoagulation with patient/family given recent falls. Blood pressure control. Check CK, Mg and b12,.       Electronically signed by CRYSATL Morse on 6/2/2019 at 10:26 AM     Copy sent to Floyd Huerta MD

## 2019-06-02 NOTE — PLAN OF CARE
Problem: Patient Care Overview  Goal: Plan of Care Review  Outcome: Ongoing (interventions implemented as appropriate)   06/02/19 0313   Coping/Psychosocial   Plan of Care Reviewed With patient   Plan of Care Review   Progress no change   OTHER   Outcome Summary Pt new admit from er with confusion at home. Pt currently alert and orient x3. PAZ generalized weakness noted when stands. No c/o pain. Bilat lower extremities swollen and reddened with old healed skin graft wounds per patient.Abrasion and brusing noted to left upper chest. Pictures taken. Bilat arms bruised with scattered small abrasions. Bed check on. Tele on runs atrial fib 90's       Problem: Fall Risk (Adult)  Goal: Identify Related Risk Factors and Signs and Symptoms  Outcome: Outcome(s) achieved Date Met: 06/02/19    Goal: Absence of Fall  Outcome: Ongoing (interventions implemented as appropriate)      Problem: Confusion, Acute (Adult)  Goal: Identify Related Risk Factors and Signs and Symptoms  Outcome: Outcome(s) achieved Date Met: 06/02/19    Goal: Cognitive/Functional Impairments Minimized  Outcome: Ongoing (interventions implemented as appropriate)    Goal: Safety  Outcome: Ongoing (interventions implemented as appropriate)

## 2019-06-02 NOTE — CONSULTS
Neurology Consult Note    Patient:  Sangita Viramontes   YOB: 1933  MRN:  [id number]  Date of Admission:  6/1/2019  3:43 PM    Date: 6/2/2019    Referring Provider: Steff Hinojosa APRN   Reason for Consultation: altered mental status      History of present illness:     This is a 86 y.o. right handed male with H/O atrial fibrillation on chronic anticoagulation (warfarin), hypertension, sleep apnea, evaluated for altered mental status.    History is obtained from the chart and patient   However he dimas snot recall the event that had family bringing him here.    Family noted some cognitive decline after he had his left femoral neck fracture in February of this year. However over the past week there was some intermittent confusion.   There is mention of worsening confusion with worsening gait over the past week. He did not recall a family member's visit and when they arrived he went to his room to dress but stayed there for 1 to 2 hours before family broke into room and found him very confused.     He states he lives alone but sees people nearly every day.     He reports he takes Benadryl as needed and gives example of taking it on days he does a lot of work and that he may take 2 at a time. He does not think he take it for itching or for sleep.     Last hospitalization at [facility] for left femoral neck fracture in February 2019  He was a former smoker but quit a long time ago-- 7/18/1991)    He has obstructive sleep apnea but dimas snot use a CPAP    Past Medical History:   Diagnosis Date   • A-fib (CMS/[facility])    • Hypertension    • PAD (peripheral artery disease) (CMS/[facility])    • Sleep apnea    • Varicose vein of leg        Past Surgical History:   Procedure Laterality Date   • CATARACT EXTRACTION Bilateral    • CHOLECYSTECTOMY     • HIP SURGERY  02/06/2019    partial left hip replacement   • LEG SURGERY     • TOTAL KNEE ARTHROPLASTY Left    • TOTAL KNEE ARTHROPLASTY Right        Prior to Admission  medications    Medication Sig Start Date End Date Taking? Authorizing Provider   acetaminophen (TYLENOL) 500 MG tablet Take 500 mg by mouth Every 6 (Six) Hours As Needed for Mild Pain .   Yes Alexa Hernandez MD   irbesartan (AVAPRO) 150 MG tablet Take 150 mg by mouth Every Night. 5/7/18  Yes Alexa Hernandez MD   Multiple Vitamins-Minerals (VISION-JUVENTINO PRESERVE PO) Take 1 tablet by mouth Daily.   Yes Alexa Hernandez MD   Omega-3 Fatty Acids (FISH OIL) 1000 MG capsule capsule Take 1,000 mg by mouth Every Night.   Yes Alexa Hernandez MD   vitamin B-12 (CYANOCOBALAMIN) 1000 MCG tablet Take 1,000 mcg by mouth Daily.   Yes Alexa Hernandez MD   vitamin E 400 UNIT capsule Take 400 Units by mouth Daily.   Yes Alexa Hernandez MD   warfarin (COUMADIN) 5 MG tablet Take 5 mg by mouth Every Other Day. Alternate dose with 7.5 mg every other day. 7/18/18  Yes Alexa Hernandez MD   warfarin (COUMADIN) 7.5 MG tablet Take 7.5 mg by mouth Every Other Day. Alternate dose with 5 mg every other day.   Yes Alexa Hernandez MD   diphenhydrAMINE (BENADRYL) 25 mg capsule Take 25 mg by mouth Every 6 (Six) Hours As Needed for Itching.  6/2/19 Yes Alexa Hernandez MD   FUROSEMIDE PO Take  by mouth Daily.  6/2/19  Alexa Hernandez MD   Glucosamine-Chondroit-Vit C-Mn (GLUCOSAMINE 1500 COMPLEX PO) Take  by mouth.  6/2/19  Alexa Hernandez MD   lisinopril-hydrochlorothiazide (PRINZIDE,ZESTORETIC) 20-12.5 MG per tablet Take 1 tablet by mouth Daily.  6/2/19  Alexa Hernandez MD       Hospital scheduled medications:     vitamin B-12 50 mcg Oral Daily   losartan 50 mg Oral Q24H   OCUVITE-LUTEIN 1 tablet Oral Daily   sodium chloride 3 mL Intravenous Q12H   vitamin E 400 Units Oral Daily   warfarin 5 mg Oral Every Other Day   [START ON 6/3/2019] warfarin 7.5 mg Oral Every Other Day     Hospital PRN medications:  •  acetaminophen  •  acetaminophen  •  diphenhydrAMINE  •  magnesium  "hydroxide  •  sodium chloride    Allergies   Allergen Reactions   • Pradaxa [Dabigatran Etexilate Mesylate] Other (See Comments)     \"Turned legs black\"   • Other Rash     PT STATES PLASTIC GLOVES BROKE HIS HANDS OUT, HE DENIES REACTION TO TAPE OR BANDAIDS       Social History     Socioeconomic History   • Marital status:      Spouse name: Not on file   • Number of children: Not on file   • Years of education: Not on file   • Highest education level: Not on file   Tobacco Use   • Smoking status: Former Smoker   • Smokeless tobacco: Former User     Types: Chew   Substance and Sexual Activity   • Alcohol use: No   • Drug use: No     Family History   Problem Relation Age of Onset   • Heart attack, stroke Mother    • Heart failure Father        Review of Systems  A 14 point review of systems was reviewed and was negative as best as can be determined    Vital Signs   Temp:  [97.4 °F (36.3 °C)-98.6 °F (37 °C)] 97.4 °F (36.3 °C)  Heart Rate:  [] 88  Resp:  [16-25] 16  BP: (132-191)/() 156/92    General Exam:  Head:  Normal cephalic, atraumatic  HEENT:  Neck supple  Fundoscopic Exam:  No signs of disc edema  CVS:  Regular rate and rhythm.  No murmurs  Carotid Examination:  No bruits  Lungs:  Clear to auscultation  Abdomen:  Non-tender, Non-distended  Extremities:  No signs of peripheral edema  Skin:  No rashes    Neurologic Exam:    Mental Status:    -Awake, Alert, Oriented to person, Knows he is at [facility]. Knows it is June 2019. Know President Sana  Knows city, state and county although initially said it was [facility] then changed to [place]. Does not know it is Spring even when asked but states it is nearly summer.   -Some word finding difficulties but more from cognitive standpoint  -No aphasia  -No dysarthria  -Follows simple commands    CN II:  Visual fields full.  Pupils equally reactive to light  CN III, IV, VI:  Extraocular Muscles full with no signs of nystagmus  CN V:  Facial " sensory is symmetric   CN VII:  Facial motor symmetric  CN VIII:  Gross hearing intact bilaterally  CN IX/X:  Palate elevates symmetrically  CN XI:  Shoulder shrug symmetric  CN XII:  Tongue is midline on protrusion    Motor: (strength out of 5:  1= minimal movement, 2 = movement in plane of gravity, 3 = movement against gravity, 4 = movement against some resistance, 5 = full strength)    There is a tremor of the bilateral arms at times.   -Right Upper Ext: Proximal: 5 Distal: 5  -Left Upper Ext: Proximal: 5 Distal: 5    -Right Lower Ext: Proximal: 5 Distal: 5  -Left Lower Ext: Proximal: 5 Distal: 5    DTR:  -Right   Bicep: 2+ Triceps: 2+ Brachioradialis: 2+   Patella: 2+ Ankle: 2+ Neg Babinski  -Left   Bicep: 2+ Triceps: 2+ Brachioradialis: 2+   Patella: 2+ Ankle: 2+ Neg Babinski    Sensory:  -Intact to light touch, pinprick  Coordination:  -Finger to nose intact  -Heel to shin intact  -No ataxia    Gait  -Not tested for safety reasons.      Results Review:  Lab Results (last 7 days)     Procedure Component Value Units Date/Time    Vitamin B12 [[id number]]  (Normal) Collected:  06/02/19 1119    Specimen:  Blood Updated:  06/02/19 1246     Vitamin B-12 851 pg/mL     Magnesium [[id number]]  (Normal) Collected:  06/02/19 1119    Specimen:  Blood Updated:  06/02/19 1156     Magnesium 2.1 mg/dL     Protime-INR [[id number]]  (Abnormal) Collected:  06/02/19 1119    Specimen:  Blood Updated:  06/02/19 1136     Protime 16.9 Seconds      INR 1.33    Urinalysis With Culture If Indicated - Urine, Clean Catch [[id number]]  (Abnormal) Collected:  06/01/19 1728    Specimen:  Urine, Clean Catch Updated:  06/01/19 1741     Color, UA Yellow     Appearance, UA Clear     pH, UA 7.0     Specific Gravity, UA 1.016     Glucose, UA Negative     Ketones, UA Trace     Bilirubin, UA Negative     Blood, UA Negative     Protein, UA Trace     Leuk Esterase, UA Negative     Nitrite, UA Negative     Urobilinogen, UA 1.0 E.U./dL    Narrative:        Urine microscopic not indicated.    Blood Culture - Blood, Arm, Left [[id number]] Collected:  06/01/19 1715    Specimen:  Blood from Arm, Left Updated:  06/01/19 1736    Procalcitonin [[id number]]  (Normal) Collected:  06/01/19 1553    Specimen:  Blood from Arm, Right Updated:  06/01/19 1706     Procalcitonin <0.25 ng/mL     Narrative:       SIRS, sepsis, severe sepsis, and septic shock are categorized according to the criteria of the consensus conference of the American College of Chest Physicians/Society of Critical Care Medicine.    PCT < 0.5 ng/mL     Systemic infection (sepsis) is not likely.    PCT >0.5 and < 2.0 ng/mL Systemic infection (sepsis) is possible, but other conditions are known to elevate PCT as well.    PCT > 2.0 ng/mL     Systemic infection (sepsis) is likely, unless other causes are known.      PCT > 10.0 ng/mL    Important systemic inflammatory response, almost exclusively due to severe bacterial sepsis or septic shock.    PCT values of < 0.5 ng/mL do not exclude an infection, because localized infections (without systemic signs) may be associated with such low concentrations, or a systemic infection in its initial stages (<6 hours).  Increased PCT can occur without infection.  PCT concentrations between 0.5 and 2.0 ng/mL should be interpreted taking into account the patients history.  It is recommended to retest PCT within 6-24 hours if any concentrations < 2.0 ng/mL are obtained.     Comment: Auto resulted       TSH [[id number]]  (Normal) Collected:  06/01/19 1553    Specimen:  Blood from Arm, Right Updated:  06/01/19 1658     TSH 1.390 mIU/mL     Troponin [[id number]]  (Normal) Collected:  06/01/19 1553    Specimen:  Blood from Arm, Right Updated:  06/01/19 1646     Troponin I <0.012 ng/mL     BNP [[id number]]  (Normal) Collected:  06/01/19 1553    Specimen:  Blood from Arm, Right Updated:  06/01/19 1646     proBNP 949.0 pg/mL     Magnesium [[id number]]  (Normal) Collected:  06/01/19 1553     Specimen:  Blood from Arm, Right Updated:  06/01/19 1635     Magnesium 2.0 mg/dL      Comment: Specimen hemolyzed.  Results may be affected.       Comprehensive Metabolic Panel [[id number]]  (Abnormal) Collected:  06/01/19 1553    Specimen:  Blood from Arm, Right Updated:  06/01/19 1635     Glucose 95 mg/dL      BUN 12 mg/dL      Creatinine 0.80 mg/dL      Sodium 141 mmol/L      Potassium 3.9 mmol/L      Comment: Specimen hemolyzed.  Results may be affected.        Chloride 106 mmol/L      CO2 29.0 mmol/L      Calcium 9.1 mg/dL      Total Protein 6.5 g/dL      Albumin 3.80 g/dL      ALT (SGPT) 25 U/L      Comment: Specimen hemolyzed.  Results may be affected.        AST (SGOT) 33 U/L      Comment: Specimen hemolyzed.  Results may be affected.        Alkaline Phosphatase 135 U/L      Comment: Specimen hemolyzed. Results may be affected.        Total Bilirubin 0.9 mg/dL      eGFR Non African Amer 92 mL/min/1.73      Globulin 2.7 gm/dL      A/G Ratio 1.4 g/dL      BUN/Creatinine Ratio 15.0     Anion Gap 6.0 mmol/L     Narrative:       GFR Normal >60  Chronic Kidney Disease <60  Kidney Failure <15    Lipase [[id number]]  (Normal) Collected:  06/01/19 1553    Specimen:  Blood from Arm, Right Updated:  06/01/19 1634     Lipase 45 U/L     Blood Culture - Blood, Arm, Right [[id number]] Collected:  06/01/19 1553    Specimen:  Blood from Arm, Right Updated:  06/01/19 1631    aPTT [[id number]]  (Normal) Collected:  06/01/19 1553    Specimen:  Blood from Arm, Right Updated:  06/01/19 1623     PTT 34.2 seconds     Protime-INR [[id number]]  (Abnormal) Collected:  06/01/19 1553    Specimen:  Blood from Arm, Right Updated:  06/01/19 1623     Protime 17.1 Seconds      INR 1.35    Lactic Acid, Plasma [[id number]]  (Normal) Collected:  06/01/19 1553    Specimen:  Blood from Arm, Right Updated:  06/01/19 1622     Lactate 1.3 mmol/L     CBC Auto Differential [[id number]]  (Abnormal) Collected:  06/01/19 1553    Specimen:  Blood from Arm, Right  Updated:  06/01/19 1620     WBC 6.91 10*3/mm3      RBC 5.27 10*6/mm3      Hemoglobin 14.8 g/dL      Hematocrit 45.1 %      MCV 85.6 fL      MCH 28.1 pg      MCHC 32.8 g/dL      RDW 13.3 %      RDW-SD 41.5 fl      MPV 11.0 fL      Platelets 165 10*3/mm3      Neutrophil % 66.1 %      Lymphocyte % 20.5 %      Monocyte % 9.8 %      Eosinophil % 2.6 %      Basophil % 0.6 %      Immature Grans % 0.4 %      Neutrophils, Absolute 4.56 10*3/mm3      Lymphocytes, Absolute 1.42 10*3/mm3      Monocytes, Absolute 0.68 10*3/mm3      Eosinophils, Absolute 0.18 10*3/mm3      Basophils, Absolute 0.04 10*3/mm3      Immature Grans, Absolute 0.03 10*3/mm3      nRBC 0.0 /100 WBC     POC Glucose Once [[id number]]  (Normal) Collected:  06/01/19 1547    Specimen:  Blood Updated:  06/01/19 1601     Glucose 87 mg/dL      Comment: : [id number] Oliver NohemimoustaphaMeter ID: [id number]             .  Imaging Results (last 24 hours)     Procedure Component Value Units Date/Time    US Venous Doppler Lower Extremity Right (duplex) [[id number]] Collected:  06/02/19 1127     Updated:  06/02/19 1130    Narrative:       History: Pain and swelling       Impression:       Impression: There is no evidence of deep venous thrombosis or  superficial thrombophlebitis of the right lower extremity.     Comments: Right lower extremity venous duplex exam was performed using  color Doppler flow, Doppler wave form analysis, and grayscale imaging,  with and without compression. There is no evidence of deep venous  thrombosis of the common femoral, superficial femoral, popliteal,  posterior tibial, and peroneal veins. There is no thrombus identified in  the saphenofemoral junction or the greater saphenous vein. There is no  evidence of clot in the left common femoral vein.     This report was finalized on 06/02/2019 11:27 by Dr. Hosea Orr MD.    CT Angiogram Chest With Contrast [[id number]] Collected:  06/01/19 1943     Updated:  06/01/19 1949    Narrative:        CT ANGIOGRAM CHEST W CONTRAST- 6/1/2019 7:23 PM CDT      HISTORY: Tachycardia, SOA, unilateral leg swelling      COMPARISON: None.      DOSE LENGTH PRODUCT: 454 mGy cm. Automated exposure control was also  utilized to decrease patient radiation dose.     TECHNIQUE: Helical tomographic images of the chest were obtained after  the administration of intravenous contrast following angiogram protocol.  Additionally, 3D and multiplanar reformatted images were provided.        FINDINGS:    Pulmonary arteries: There is adequate enhancement of the pulmonary  arteries to evaluate for central and segmental pulmonary emboli. There  are no filling defects within the main, lobar, segmental or visualized  subsegmental pulmonary arteries. The pulmonary arteries are enlarged,  consistent with pulmonary arterial hypertension.      Aorta and great vessels: The ascending aorta measures 4.3 cm in  diameter. There is mild atherosclerosis. There is atherosclerosis in the  great vessels. Coronary artery calcifications are present.     Visualized neck base: The imaged portion of the base of the neck appears  unremarkable.      Lungs: Bilateral pleural effusions are noted. Mild interlobular septal  thickening is seen in the lung bases. The lungs are hypoinflated. The  trachea and bronchial tree are patent.      Heart: The heart is moderately enlarged. There is no pericardial  effusion.      Mediastinum and lymph nodes: No enlarged mediastinal, hilar, or axillary  lymph nodes are present.      Skeletal and soft tissues: The osseous structures of the thorax and  surrounding soft tissues demonstrate no acute process. Degenerative  changes are seen in the shoulders and spine.     Upper abdomen: The imaged portion of the upper abdomen demonstrates no  acute process.        Impression:       1. Cardiomegaly and early pulmonary edema.  2. No evidence of pulmonary embolus.  3. Pulmonary arterial hypertension.  4. Ectasia of the ascending thoracic  aorta up to 4.3 cm.        This report was finalized on 06/01/2019 19:46 by Dr. Elliott Rm MD.    CT Head Without Contrast [[id number]] Collected:  06/01/19 1704     Updated:  06/01/19 1708    Narrative:       CT HEAD WO CONTRAST- 6/1/2019 4:55 PM CDT     HISTORY: ams     COMPARISON: None      DOSE LENGTH PRODUCT: 664 mGy cm. Automated exposure control was also  utilized to decrease patient radiation dose.     TECHNIQUE: Helical tomographic images of the brain were obtained without  the use of contrast. Multiplanar reformatted images were provided for  review.     FINDINGS:   The midline structures are nondisplaced. There is moderate cerebral and  cerebellar atrophy, with an associated increase in the prominence of the  ventricles and sulci. The basilar cisterns are normal in size and  configuration. There is no evidence of intracranial hemorrhage or  mass-effect. There is low attenuation in the periventricular white  matter, consistent with chronic ischemic change. There is an old lacunar  infarct in the LEFT basal ganglia. There are no abnormal extra-axial  fluid collections. There is no evidence of tonsillar herniation.      The included orbits and their contents are unremarkable. The visualized  paranasal sinuses, mastoid air cells and middle ear cavities are clear.  The visualized osseous structures and overlying soft tissues of the  skull and face are intact.        Impression:       Moderate cerebral and cerebellar atrophy with chronic microvascular  disease but no evidence of acute intracranial process.        This report was finalized on 06/01/2019 17:05 by Dr. Elliott Rm MD.              Impression  1. Apparently some cognitive worsening on background of cognitive decline He especially seems confused about his medications and so strong in the differential would be that he is not taking these correctly. Will need to discuss with family  2. Sleep apnea untreated may cause memory loss and cognitive  impairment an dif severe enough may cause seizure like activity.No history to suggest this but again I've not been able to discuss with family  3. Benadryl worsen cognition in a dementia patient and should probably be discontinued especially since he does not seem to know what it is for.       In this setting cannot determine if there is an underlying dementia.  Will need more information. Typically this is not an acute care diagnosis but history may indicate this is likely.     Plan    · Treat any underlying sleep apnea--should be seen in sleep clinic (neurology with sleep specialist)   · Hold Benadryl  · Obtain MRI of brain  · Obtain EEG  · Hopefully can meet with family and get a better history    I discussed the patients findings and my recommendations with patient and nursing staff    Joseline Segura MD  06/02/19  4:32 PM

## 2019-06-02 NOTE — PLAN OF CARE
Problem: Patient Care Overview  Goal: Plan of Care Review  Outcome: Ongoing (interventions implemented as appropriate)   06/02/19 9389   Coping/Psychosocial   Plan of Care Reviewed With patient;family   OTHER   Outcome Summary PT IE complete. Pt required min assist to stand bedside. Initial standing balance is poor, does improve during ambulation w/ RW. Pt ambulated ~ 40 feet cg assist x1 w/ RW. PT to address functional mobility deficits. Pt lives alone. Recommend transitional care vs SNF at [facility]. Thank you for referral.

## 2019-06-02 NOTE — PROGRESS NOTES
"Discharge Planning Assessment  [facility]     Patient Name: Sangita Viramontes  MRN: [id number]  Today's Date: 6/2/2019    Admit Date: 6/1/2019    Discharge Needs Assessment     Row Name 06/02/19 0909       Living Environment    Lives With  alone    Current Living Arrangements  home/apartment/condo    Primary Care Provided by  self    Provides Primary Care For  no one    Family Caregiver if Needed  child(gildardo), adult    Quality of Family Relationships  helpful;involved;supportive    Able to Return to Prior Arrangements  no       Resource/Environmental Concerns    Resource/Environmental Concerns  none    Transportation Concerns  car, none       Transition Planning    Patient/Family Anticipates Transition to  inpatient rehabilitation facility    Patient/Family Anticipated Services at Transition  skilled nursing    Transportation Anticipated  family or friend will provide       Discharge Needs Assessment    Readmission Within the Last 30 Days  no previous admission in last 30 days    Concerns to be Addressed  discharge planning    Equipment Currently Used at Home  walker, rolling;cane, quad    Anticipated Changes Related to Illness  none    Equipment Needed After Discharge  none    Discharge Facility/Level of Care Needs  nursing facility, skilled    Offered/Gave Vendor List  yes    Patient's Choice of Community Agency(s)  [facility]     Discharge Coordination/Progress  Pt has RX coverage and a PCP. Pt currently lives at home alone and was discharged from [facility] \"not too long ago.\" MIRANDA spoke with pts son, Anatoliy, and he stated that pt is not alert and oriented at this time. Anatoliy is requesting referral to be sent to [facility]. MIRANDA spoke with [facility] and requesting PT/OT orders. MIRANDA will fax appropriate information over to facility and will fax PT/OT evals when documented. At this time H&P is not completed. Pt will need precert to go to facility. MIRANDA will follow and await for documentation to send referral.     "     Discharge Plan    No documentation.       Destination      No service coordination in this encounter.      Durable Medical Equipment      No service coordination in this encounter.      Dialysis/Infusion      No service coordination in this encounter.      Home Medical Care      No service coordination in this encounter.      Therapy      No service coordination in this encounter.      Community Resources      No service coordination in this encounter.          Demographic Summary    No documentation.       Functional Status    No documentation.       Psychosocial    No documentation.       Abuse/Neglect    No documentation.       Legal    No documentation.       Substance Abuse    No documentation.       Patient Forms    No documentation.           Marissa Wu

## 2019-06-02 NOTE — PLAN OF CARE
Problem: Patient Care Overview  Goal: Plan of Care Review  Outcome: Ongoing (interventions implemented as appropriate)   06/02/19 9027   Coping/Psychosocial   Plan of Care Reviewed With patient;son   Plan of Care Review   Progress improving   OTHER   Outcome Summary OT Eval completed. Pt oriented to person and time only. He was mildly impulsive and required extra time to complete tasks and transfers. CGA to Min A for UB ADL. Mod A for LB ADL. CGA to Min A for bed mobility, transfers and functional mobility using a RW. Pt fatigues easily. Pt is a fall risk. OT will continue working with pt. Anticipated d/c is SNF for continued rehab, TCU or d/c home with 24 hour care and home health pending progress.           Providers


Date of admission: 


11/06/18 11:25





Expected date of discharge: 11/07/18


Attending physician: 


Josue Morris





Consults: 





 





11/06/18 14:29


Consult Physician Routine 


   Consulting Provider: Celetsino Jack


   Consult Reason/Comments: medical management


   Do you want consulting provider notified?: Yes











Primary care physician: 


Gilbert Enciso








- Discharge Diagnosis(es)


(1) Primary osteoarthritis of right knee


Current Visit: Yes   Status: Acute   





(2) S/P total knee arthroplasty


Current Visit: Yes   Status: Acute   


Hospital Course: 


This is a 68-year-old male with known history of degenerative arthritis of the 

right knee.  The patient presents for evaluation.  After discussion and 

consideration patient elects to proceed with total knee arthroplasty.  The 

patient is seen preoperatively by Dr. Morris and medically cleared for 

surgery by their primary care physician.





Patient is admitted to [facility] on 11/06/2018 for total knee 

arthroplasty.  The procedures performed without complication or sequelae.  The 

patient is doing well postoperatively.  Labs and vital signs are stable on day 

of discharge.





On day of discharge patient's knee incision is healing well.  There is minimal 

erythema.  There is no drainage noted at this time.  There is minimal soft 

tissue swelling to the knee.  Patient has full foot and ankle motion without 

difficulty or pain.  Neurovascular status to the right lower extremity is 

intact.  Patient is discharged home in good condition. Please see med rec for 

accurate list of home medications.








Plan - Discharge Summary


Discharge Rx Participant: Yes


New Discharge Prescriptions: 


New


   Aspirin 325 mg PO BID #60 tab


   HYDROcodone/APAP 5-325MG [Norco 5-325] 1 - 2 tab PO Q4-6H PRN #84 tab


     PRN Reason: Pain


   Sennosides [Senokot] 1 tab PO BID #60 tablet





No Action


   amLODIPine [Norvasc] 10 mg PO QAM


   Ramipril [Altace] 10 mg PO BID


   Naproxen 500 mg PO BID


   Hydrochlorothiazide 12.5 mg PO QAM


   Doxazosin [Cardura] 2 mg PO BID


   Aspirin [Adult Low Dose Aspirin EC] 81 mg PO DAILY


Discharge Medication List





Aspirin [Adult Low Dose Aspirin EC] 81 mg PO DAILY 10/30/18 [History]


Doxazosin [Cardura] 2 mg PO BID 10/30/18 [History]


Hydrochlorothiazide 12.5 mg PO QAM 10/30/18 [History]


Naproxen 500 mg PO BID 10/30/18 [History]


Ramipril [Altace] 10 mg PO BID 10/30/18 [History]


amLODIPine [Norvasc] 10 mg PO QAM 10/30/18 [History]


Aspirin 325 mg PO BID #60 tab 11/07/18 [Rx]


HYDROcodone/APAP 5-325MG [Norco 5-325] 1 - 2 tab PO Q4-6H PRN #84 tab 11/07/18 [

Rx]


Sennosides [Senokot] 1 tab PO BID #60 tablet 11/07/18 [Rx]








Follow up Appointment(s)/Referral(s): 


Josue Morris DO [Doctor of Osteopathic Medicine] - 2 Weeks


Ambulatory/Diagnostic Orders: 


Continuous Passive Motion (CPM) Machine [DME.AMB1] Time Frame: 3 Weeks, Location

: None Selected


Activity/Diet/Wound Care/Special Instructions: 


Weightbearing as tolerated with a walker.


CPM 5-6h daily.


Leave dressing intact.  May be removed by home care nurse in 10 days.


May shower with dressing on.


Please call [facility] with any questions or concerns, [phone].





Discharge Disposition: HOME WITH HOME HEALTH SERVICES

## 2019-06-02 NOTE — ED PROVIDER NOTES
"Subjective   History of Present Illness  86-year-old male presents with his family who has a chief concern of confusion at home.  They report the past few days he had intermittent confusion but it had gotten significantly worse today.  They note that he had cooked breakfast today but had seemed to forget that his son was there and did not cook breakfast for his son and all himself.  He also had gas coming from out of town, [place], and he had completely forgotten about them coming.  The patient then had locked himself in a room for 2 hours and they could not get him to open the door and had to forcibly take the door down.  EMS had brought him to this hospital for further evaluation.  At present the patient is comfortable and knows the year but is repeatedly forgetting where he actually it is.  Sometimes he does realize that the hospital but throughout his visit he continues to forget that he is in the hospital.  Family notes he also has some right lower extremity edema which is worse than usual.  Patient has no complaints of any chest pain shortness of breath nausea vomiting or diarrhea  Review of Systems   All other systems reviewed and are negative.      Past Medical History:   Diagnosis Date   • A-fib (CMS/HCC)    • Hypertension    • PAD (peripheral artery disease) (CMS/HCC)    • Sleep apnea    • Varicose vein of leg        Allergies   Allergen Reactions   • Pradaxa [Dabigatran Etexilate Mesylate] Other (See Comments)     \"Turned legs black\"   • Other Rash     PT STATES PLASTIC GLOVES BROKE HIS HANDS OUT, HE DENIES REACTION TO TAPE OR BANDAIDS       Past Surgical History:   Procedure Laterality Date   • CATARACT EXTRACTION Bilateral    • CHOLECYSTECTOMY     • HIP SURGERY  02/06/2019    partial left hip replacement   • LEG SURGERY     • TOTAL KNEE ARTHROPLASTY Left    • TOTAL KNEE ARTHROPLASTY Right        Family History   Problem Relation Age of Onset   • Heart attack Mother    • Heart failure Father  "       Social History     Socioeconomic History   • Marital status:      Spouse name: Not on file   • Number of children: Not on file   • Years of education: Not on file   • Highest education level: Not on file   Tobacco Use   • Smoking status: Former Smoker   • Smokeless tobacco: Former User     Types: Chew   Substance and Sexual Activity   • Alcohol use: No   • Drug use: No           Objective   Physical Exam   Constitutional: He is oriented to person, place, and time. He appears well-developed and well-nourished.   HENT:   Head: Normocephalic and atraumatic.   Eyes: EOM are normal. Pupils are equal, round, and reactive to light.   Neck: Normal range of motion. Neck supple.   Cardiovascular: Normal rate and normal heart sounds.   Pulmonary/Chest: Effort normal and breath sounds normal.   Abdominal: Soft. Bowel sounds are normal.   Musculoskeletal: Normal range of motion.   Neurological: He is alert and oriented to person, place, and time. He has normal strength. He displays a negative Romberg sign.   Skin: Skin is warm. Capillary refill takes less than 2 seconds.   Psychiatric: He has a normal mood and affect. His behavior is normal.   Nursing note and vitals reviewed.      Procedures           ED Course        Labs Reviewed   URINALYSIS W/ CULTURE IF INDICATED - Abnormal; Notable for the following components:       Result Value    Ketones, UA Trace (*)     Protein, UA Trace (*)     All other components within normal limits    Narrative:     Urine microscopic not indicated.   COMPREHENSIVE METABOLIC PANEL - Abnormal; Notable for the following components:    Alkaline Phosphatase 135 (*)     All other components within normal limits    Narrative:     GFR Normal >60  Chronic Kidney Disease <60  Kidney Failure <15   CBC WITH AUTO DIFFERENTIAL - Abnormal; Notable for the following components:    MCHC 32.8 (*)     All other components within normal limits   PROTIME-INR - Abnormal; Notable for the following  components:    Protime 17.1 (*)     INR 1.35 (*)     All other components within normal limits   LACTIC ACID, PLASMA - Normal   LIPASE - Normal   MAGNESIUM - Normal   PROCALCITONIN - Normal    Narrative:     SIRS, sepsis, severe sepsis, and septic shock are categorized according to the criteria of the consensus conference of the American College of Chest Physicians/Society of Critical Care Medicine.    PCT < 0.5 ng/mL     Systemic infection (sepsis) is not likely.    PCT >0.5 and < 2.0 ng/mL Systemic infection (sepsis) is possible, but other conditions are known to elevate PCT as well.    PCT > 2.0 ng/mL     Systemic infection (sepsis) is likely, unless other causes are known.      PCT > 10.0 ng/mL    Important systemic inflammatory response, almost exclusively due to severe bacterial sepsis or septic shock.    PCT values of < 0.5 ng/mL do not exclude an infection, because localized infections (without systemic signs) may be associated with such low concentrations, or a systemic infection in its initial stages (<6 hours).  Increased PCT can occur without infection.  PCT concentrations between 0.5 and 2.0 ng/mL should be interpreted taking into account the patients history.  It is recommended to retest PCT within 6-24 hours if any concentrations < 2.0 ng/mL are obtained.   TSH - Normal   TROPONIN (IN-HOUSE) - Normal   BNP (IN-HOUSE) - Normal   APTT - Normal   POCT GLUCOSE FINGERSTICK - Normal   BLOOD CULTURE   BLOOD CULTURE   RAINBOW DRAW    Narrative:     The following orders were created for panel order Morris Draw.  Procedure                               Abnormality         Status                     ---------                               -----------         ------                     Light Blue Top[[id number]]                                   Final result               Green Top (Gel)[[id number]]                                  Final result               Lavender Top[[id number]]                                      Final result               Red Top[[id number]]                                          Final result               [place] Blood Culture [place]...[[id number]]                      Final result                 Please view results for these tests on the individual orders.   LIGHT BLUE TOP   GREEN TOP   LAVENDER TOP   RED TOP   RAINBOW BLOOD CULTURE BOTTLES - 1 SET     CT Angiogram Chest With Contrast   Final Result   1. Cardiomegaly and early pulmonary edema.   2. No evidence of pulmonary embolus.   3. Pulmonary arterial hypertension.   4. Ectasia of the ascending thoracic aorta up to 4.3 cm.           This report was finalized on 06/01/2019 19:46 by Dr. Elliott Rm MD.      CT Head Without Contrast   Final Result   Moderate cerebral and cerebellar atrophy with chronic microvascular   disease but no evidence of acute intracranial process.           This report was finalized on 06/01/2019 17:05 by Dr. Elliott Rm MD.      XR Chest 1 View   Final Result      US Venous Doppler Lower Extremity Right (duplex)    (Results Pending)               MDM  Number of Diagnoses or Management Options  Diagnosis management comments: The patient does have a subtherapeutic INR.  He does repeatedly forget where he is.  Patient family says he is very confused.  On my examination he does know where he is in the year but does forget where he is on repeat examinations.  Unsure of who the president is.  Admit to Dr. Peres for altered mental status       Amount and/or Complexity of Data Reviewed  Clinical lab tests: ordered and reviewed  Tests in the radiology section of CPT®: ordered and reviewed  Tests in the medicine section of CPT®: reviewed and ordered  Decide to obtain previous medical records or to obtain history from someone other than the patient: yes    Risk of Complications, Morbidity, and/or Mortality  Presenting problems: moderate  Diagnostic procedures: moderate  Management options: moderate    Patient Progress  Patient  progress: stable        Final diagnoses:   Altered mental status, unspecified altered mental status type   Subtherapeutic international normalized ratio (INR)            Javier Husain PA-C  06/01/19 6582

## 2019-06-02 NOTE — THERAPY EVALUATION
Acute Care - Occupational Therapy Initial Evaluation  [facility]     Patient Name: Sangita Viramontes  : 1933  MRN: [id number]  Today's Date: 2019  Onset of Illness/Injury or Date of Surgery: 19  Date of Referral to OT: 19  Referring Physician: CRYSTAL Thompson    Admit Date: 2019       ICD-10-CM ICD-9-CM   1. Altered mental status, unspecified altered mental status type R41.82 780.97   2. Subtherapeutic international normalized ratio (INR) R79.1 790.92   3. Decreased activities of daily living (ADL) R68.89 780.99     Patient Active Problem List   Diagnosis   • Hypertension   • Varicose veins of both legs with edema   • Paroxysmal atrial fibrillation (CMS/HCC)   • PAD (peripheral artery disease) (CMS/HCC)   • Antiplatelet or antithrombotic long-term use   • Anticoagulant long-term use   • Venous insufficiency (chronic) (peripheral)   • Altered mental status     Past Medical History:   Diagnosis Date   • A-fib (CMS/HCC)    • Hypertension    • PAD (peripheral artery disease) (CMS/HCC)    • Sleep apnea    • Varicose vein of leg      Past Surgical History:   Procedure Laterality Date   • CATARACT EXTRACTION Bilateral    • CHOLECYSTECTOMY     • HIP SURGERY  2019    partial left hip replacement   • LEG SURGERY     • TOTAL KNEE ARTHROPLASTY Left    • TOTAL KNEE ARTHROPLASTY Right           OT ASSESSMENT FLOWSHEET (last 12 hours)      Occupational Therapy Evaluation     Row Name 19 1100                   OT Evaluation Time/Intention    Subjective Information  no complaints  -TR        Document Type  evaluation  -TR        Mode of Treatment  occupational therapy  -TR        Patient Effort  good  -TR        Symptoms Noted During/After Treatment  none  -TR           General Information    Patient Profile Reviewed?  yes  -TR        Onset of Illness/Injury or Date of Surgery  19  -TR        Referring Physician  Dr. Peres  -TR        Patient Observations  alert;cooperative;agree  to therapy  -TR        Patient/Family Observations  Son present  -TR        General Observations of Patient  Fowlers in bed, telemetry.   -TR        Prior Level of Function  independent:;gait;transfer;ADL's;cooking;cleaning;dependent:;driving  -TR        Equipment Currently Used at Home  cane, straight;walker, rolling;wheelchair Built in shower chair.   -TR        Pertinent History of Current Functional Problem  Brought by EMS to ER following increasing confusion over the past week with a fall at home and ataxia. CT Head and CXR negative for acute findings. Dx: AMS, subtherapeutic INR, ataxia, a-fib, HTN.   -TR        Existing Precautions/Restrictions  fall  -TR        Limitations/Impairments  safety/cognitive  -TR        Risks Reviewed  patient and family:;LOB;dizziness;increased discomfort;change in vital signs  -TR        Benefits Reviewed  patient and family:;improve function;increase independence;increase strength;increase balance;decrease pain;increase knowledge  -TR        Barriers to Rehab  cognitive status  -TR           Relationship/Environment    Lives With  alone  -TR        Family Caregiver if Needed  child(gildardo), adult  -TR           Resource/Environmental Concerns    Current Living Arrangements  home/apartment/condo  -TR           Cognitive Assessment/Intervention- PT/OT    Orientation Status (Cognition)  oriented to;person;time;disoriented to;place;situation  -TR        Follows Commands (Cognition)  follows one step commands;over 90% accuracy  -TR        Safety Deficit (Cognitive)  mild deficit  -TR           Bed Mobility Assessment/Treatment    Bed Mobility Assessment/Treatment  scooting/bridging;supine-sit;sit-supine  -TR        Scooting/Bridging [place] (Bed Mobility)  moderate assist (50% patient effort)  -TR        Supine-Sit [place] (Bed Mobility)  contact guard;verbal cues  -TR        Sit-Supine [place] (Bed Mobility)  contact guard;verbal cues  -TR        Assistive Device (Bed  Mobility)  bed rails;head of bed elevated  -TR           Functional Mobility    Functional Mobility- Ind. Level  contact guard assist  -TR        Functional Mobility- Device  rolling walker  -TR        Functional Mobility-Distance (Feet)  50  -TR           Transfer Assessment/Treatment    Transfer Assessment/Treatment  sit-stand transfer;stand-sit transfer  -TR           Sit-Stand Transfer    Sit-Stand [place] (Transfers)  contact guard;verbal cues  -TR        Assistive Device (Sit-Stand Transfers)  walker, front-wheeled  -TR           Stand-Sit Transfer    Stand-Sit [place] (Transfers)  contact guard;verbal cues  -TR        Assistive Device (Stand-Sit Transfers)  walker, front-wheeled  -TR           ADL Assessment/Intervention    BADL Assessment/Intervention  lower body dressing;toileting  -TR           Lower Body Dressing Assessment/Training    Lower Body Dressing [place] Level  don;socks;moderate assist (50% patient effort)  -TR        Lower Body Dressing Position  unsupported sitting  -TR           Toileting Assessment/Training    [place] Level (Toileting)  toileting skills;contact guard assist  -TR        Assistive Devices (Toileting)  grab bar/safety frame  -TR        Toileting Position  supported standing  -TR           BADL Safety/Performance    Impairments, BADL Safety/Performance  balance;endurance/activity tolerance;cognition;trunk/postural control;range of motion;strength;coordination  -TR           General ROM    GENERAL ROM COMMENTS  B shoulder flexion AROM 50% impaired. Distal B UE AROM WFL.   -TR           MMT (Manual Muscle Testing)    General MMT Comments  B UE 4/5 within available ROM.   -TR           Motor Assessment/Interventions    Additional Documentation  Gross Motor Coordination (Group)  -TR           Gross Motor Coordination    Gross Motor Skill, Impairments Detail  B UE and LE minimally impaired. No ataxia noted.   -TR           Sensory Assessment/Intervention     Sensory General Assessment  no sensation deficits identified  -TR           Positioning and Restraints    Pre-Treatment Position  in bed  -TR        Post Treatment Position  bed  -TR        In Bed  fowlers;call light within reach;encouraged to call for assist;exit alarm on;with family/caregiver;side rails up x2  -TR           Pain Assessment    Additional Documentation  Pain Scale: Numbers Pre/Post-Treatment (Group)  -TR           Pain Scale: Numbers Pre/Post-Treatment    Pain Scale: Numbers, Pretreatment  0/10 - no pain  -TR        Pain Scale: Numbers, Post-Treatment  0/10 - no pain  -TR           Wound 06/02/19 0000 Left clavicle abrasion    Wound - Properties Group Date first assessed: 06/02/19  -AR Time first assessed: 0000  -AR Side: Left  -AR Location: clavicle  -AR Type: abrasion  -AR       Plan of Care Review    Plan of Care Reviewed With  patient;son  -TR           Clinical Impression (OT)    Date of Referral to OT  06/02/19  -TR        OT Diagnosis  Decreased ADL  -TR        Patient/Family Goals Statement (OT Eval)  D/C to SNF if needed.   -TR        Criteria for Skilled Therapeutic Interventions Met (OT Eval)  yes;treatment indicated  -TR        Rehab Potential (OT Eval)  good, to achieve stated therapy goals  -TR        Therapy Frequency (OT Eval)  5 times/wk  -TR        Predicted Duration of Therapy Intervention (Therapy Eval)  10 days  -TR        Care Plan Review (OT)  evaluation/treatment results reviewed;care plan/treatment goals reviewed;risks/benefits reviewed;current/potential barriers reviewed;patient/other agree to care plan  -TR        Care Plan Review, Other Participant (OT Eval)  son  -TR        Anticipated Discharge Disposition (OT)  skilled nursing facility;transitional care;home with 24/7 care  -TR           Planned OT Interventions    Planned Therapy Interventions (OT Eval)  activity tolerance training;BADL retraining;functional balance retraining;transfer/mobility  retraining;ROM/therapeutic exercise;strengthening exercise;patient/caregiver education/training;occupation/activity based interventions;IADL retraining  -TR           OT Goals    Transfer Goal Selection (OT)  transfer, OT goal 1  -TR        Bathing Goal Selection (OT)  bathing, OT goal 1  -TR        Dressing Goal Selection (OT)  dressing, OT goal 1  -TR           Transfer Goal 1 (OT)    Activity/Assistive Device (Transfer Goal 1, OT)  sit-to-stand/stand-to-sit;bed-to-chair/chair-to-bed;toilet;shower chair  -TR        [place] Level/Cues Needed (Transfer Goal 1, OT)  supervision required  -TR        Time Frame (Transfer Goal 1, OT)  by discharge;long term goal (LTG)  -TR        Progress/Outcome (Transfer Goal 1, OT)  goal ongoing  -TR           Bathing Goal 1 (OT)    Activity/Assistive Device (Bathing Goal 1, OT)  upper body bathing;lower body bathing;shower chair  -TR        [place] Level/Cues Needed (Bathing Goal 1, OT)  contact guard assist  -TR        Time Frame (Bathing Goal 1, OT)  by discharge;long term goal (LTG)  -TR        Progress/Outcomes (Bathing Goal 1, OT)  goal ongoing  -TR           Dressing Goal 1 (OT)    Activity/Assistive Device (Dressing Goal 1, OT)  lower body dressing  -TR        [place]/Cues Needed (Dressing Goal 1, OT)  supervision required  -TR        Time Frame (Dressing Goal 1, OT)  by discharge;long term goal (LTG)  -TR        Progress/Outcome (Dressing Goal 1, OT)  goal ongoing  -TR           Living Environment    Home Accessibility  -- Ramp and walk in shower.   -TR          User Key  (r) = Recorded By, (t) = Taken By, (c) = Cosigned By    Initials Name Effective Dates    Shonna Salazar RN 08/02/16 -     TR Danita Davila OTR/SUSAN 06/22/15 -          Occupational Therapy Education     Title: PT OT SLP Therapies (In Progress)     Topic: Occupational Therapy (In Progress)     Point: ADL training (Done)     Description: Instruct learner(s) on proper safety adaptation  and remediation techniques during self care or transfers.   Instruct in proper use of assistive devices.    Learning Progress Summary           Patient Acceptance, E,D, VU,NR by TR at 6/2/2019  1:54 PM    Comment:  Education provided on purpose of OT eval, impairments found, need for continued intervention and d/c planning.                   Point: Precautions (Done)     Description: Instruct learner(s) on prescribed precautions during self-care and functional transfers.    Learning Progress Summary           Patient Acceptance, E,D, VU,NR by TR at 6/2/2019  1:54 PM    Comment:  Education provided on purpose of OT eval, impairments found, need for continued intervention and d/c planning.                               User Key     Initials Effective Dates Name Provider Type Discipline    TR 06/22/15 -  Danita Davila, OTR/L Occupational Therapist OT                  OT Recommendation and Plan  Outcome Summary/Treatment Plan (OT)  Anticipated Discharge Disposition (OT): skilled nursing facility, transitional care, home with 24/7 care  Planned Therapy Interventions (OT Eval): activity tolerance training, BADL retraining, functional balance retraining, transfer/mobility retraining, ROM/therapeutic exercise, strengthening exercise, patient/caregiver education/training, occupation/activity based interventions, IADL retraining  Therapy Frequency (OT Eval): 5 times/wk  Plan of Care Review  Plan of Care Reviewed With: patient, son  Plan of Care Reviewed With: patient, son  Outcome Summary: OT Eval completed. Pt oriented to person and time only. He was mildly impulsive and required extra time to complete tasks and transfers. CGA to Min A for UB ADL. Mod A for LB ADL. CGA for bed mobility, transfers and functional mobility using a RW. Pt fatigues easily. Pt is a fall risk. OT will continue working with pt. Anticipated d/c is SNF for continued rehab, TCU or d/c home with 24 hour care and home health pending progress.      Outcome Measures     Row Name 06/02/19 1355 06/02/19 1300          How much help from another person do you currently need...    Turning from your back to your side while in flat bed without using bedrails?  4  -LH  --     Moving from lying on back to sitting on the side of a flat bed without bedrails?  3  -LH  --     Moving to and from a bed to a chair (including a wheelchair)?  3  -LH  --     Standing up from a chair using your arms (e.g., wheelchair, bedside chair)?  3  -LH  --     Climbing 3-5 steps with a railing?  3  -LH  --     To walk in hospital room?  3  -LH  --     AM-PAC 6 Clicks Score  19  -LH  --        How much help from another is currently needed...    Putting on and taking off regular lower body clothing?  --  2  -TR     Bathing (including washing, rinsing, and drying)  --  2  -TR     Toileting (which includes using toilet bed pan or urinal)  --  3  -TR     Putting on and taking off regular upper body clothing  --  3  -TR     Taking care of personal grooming (such as brushing teeth)  --  3  -TR     Eating meals  --  4  -TR     Score  --  17  -TR        Functional Assessment    Outcome Measure Options  AM-PAC 6 Clicks Basic Mobility (PT)  -  AM-PAC 6 Clicks Daily Activity (OT)  -TR       User Key  (r) = Recorded By, (t) = Taken By, (c) = Cosigned By    Initials Name Provider Type     Beny Allison, PT Physical Therapist    Danita Deluca, OTR/L Occupational Therapist          Time Calculation:   Time Calculation- OT     Row Name 06/02/19 1358             Time Calculation- OT    OT Start Time  1105  -TR      OT Stop Time  1158  -TR      OT Time Calculation (min)  53 min  -TR      OT Received On  06/02/19  -TR      OT Goal Re-Cert Due Date  06/12/19  -TR        User Key  (r) = Recorded By, (t) = Taken By, (c) = Cosigned By    Initials Name Provider Type    Danita Deluca, OTR/L Occupational Therapist        Therapy Charges for Today     Code Description Service Date Service  Provider Modifiers Qty    85842790561 HC OT EVAL MOD COMPLEXITY 4 6/2/2019 Danita Davila, OTR/L GO 1               Danita Davila OTR/L  6/2/2019

## 2019-06-03 ENCOUNTER — APPOINTMENT (OUTPATIENT)
Dept: MRI IMAGING | Facility: HOSPITAL | Age: 84
End: 2019-06-03

## 2019-06-03 ENCOUNTER — APPOINTMENT (OUTPATIENT)
Dept: NEUROLOGY | Facility: HOSPITAL | Age: 84
End: 2019-06-03

## 2019-06-03 LAB
ANION GAP SERPL CALCULATED.3IONS-SCNC: 8 MMOL/L (ref 4–13)
BASOPHILS # BLD AUTO: 0.05 10*3/MM3 (ref 0–0.2)
BASOPHILS NFR BLD AUTO: 0.4 % (ref 0–2)
BUN BLD-MCNC: 10 MG/DL (ref 5–21)
BUN/CREAT SERPL: 14.3 (ref 7–25)
CALCIUM SPEC-SCNC: 9.2 MG/DL (ref 8.4–10.4)
CHLORIDE SERPL-SCNC: 106 MMOL/L (ref 98–110)
CO2 SERPL-SCNC: 28 MMOL/L (ref 24–31)
CREAT BLD-MCNC: 0.7 MG/DL (ref 0.5–1.4)
DEPRECATED RDW RBC AUTO: 41.7 FL (ref 40–54)
EOSINOPHIL # BLD AUTO: 0.12 10*3/MM3 (ref 0–0.7)
EOSINOPHIL NFR BLD AUTO: 1.1 % (ref 0–4)
ERYTHROCYTE [DISTWIDTH] IN BLOOD BY AUTOMATED COUNT: 13.7 % (ref 12–15)
GFR SERPL CREATININE-BSD FRML MDRD: 107 ML/MIN/1.73
GLUCOSE BLD-MCNC: 92 MG/DL (ref 70–100)
HCT VFR BLD AUTO: 48.1 % (ref 40–52)
HGB BLD-MCNC: 16.1 G/DL (ref 14–18)
IMM GRANULOCYTES # BLD AUTO: 0.05 10*3/MM3 (ref 0–0.05)
IMM GRANULOCYTES NFR BLD AUTO: 0.4 % (ref 0–5)
INR PPP: 1.34 (ref 0.91–1.09)
LYMPHOCYTES # BLD AUTO: 1.78 10*3/MM3 (ref 0.72–4.86)
LYMPHOCYTES NFR BLD AUTO: 15.9 % (ref 15–45)
MCH RBC QN AUTO: 28.2 PG (ref 28–32)
MCHC RBC AUTO-ENTMCNC: 33.5 G/DL (ref 33–36)
MCV RBC AUTO: 84.2 FL (ref 82–95)
MONOCYTES # BLD AUTO: 1.07 10*3/MM3 (ref 0.19–1.3)
MONOCYTES NFR BLD AUTO: 9.5 % (ref 4–12)
NEUTROPHILS # BLD AUTO: 8.15 10*3/MM3 (ref 1.87–8.4)
NEUTROPHILS NFR BLD AUTO: 72.7 % (ref 39–78)
NRBC BLD AUTO-RTO: 0 /100 WBC (ref 0–0.2)
PLATELET # BLD AUTO: 179 10*3/MM3 (ref 130–400)
PMV BLD AUTO: 11.3 FL (ref 6–12)
POTASSIUM BLD-SCNC: 3.6 MMOL/L (ref 3.5–5.3)
PROTHROMBIN TIME: 17 SECONDS (ref 11.9–14.6)
RBC # BLD AUTO: 5.71 10*6/MM3 (ref 4.8–5.9)
SODIUM BLD-SCNC: 142 MMOL/L (ref 135–145)
WBC NRBC COR # BLD: 11.22 10*3/MM3 (ref 4.8–10.8)

## 2019-06-03 PROCEDURE — 97116 GAIT TRAINING THERAPY: CPT

## 2019-06-03 PROCEDURE — 80048 BASIC METABOLIC PNL TOTAL CA: CPT | Performed by: NURSE PRACTITIONER

## 2019-06-03 PROCEDURE — A9577 INJ MULTIHANCE: HCPCS | Performed by: INTERNAL MEDICINE

## 2019-06-03 PROCEDURE — 95816 EEG AWAKE AND DROWSY: CPT

## 2019-06-03 PROCEDURE — 0 GADOBENATE DIMEGLUMINE 529 MG/ML SOLUTION: Performed by: INTERNAL MEDICINE

## 2019-06-03 PROCEDURE — 85025 COMPLETE CBC W/AUTO DIFF WBC: CPT | Performed by: NURSE PRACTITIONER

## 2019-06-03 PROCEDURE — 95816 EEG AWAKE AND DROWSY: CPT | Performed by: PSYCHIATRY & NEUROLOGY

## 2019-06-03 PROCEDURE — G0378 HOSPITAL OBSERVATION PER HR: HCPCS

## 2019-06-03 PROCEDURE — 70553 MRI BRAIN STEM W/O & W/DYE: CPT

## 2019-06-03 PROCEDURE — 85610 PROTHROMBIN TIME: CPT | Performed by: NURSE PRACTITIONER

## 2019-06-03 RX ORDER — METOPROLOL TARTRATE 50 MG/1
50 TABLET, FILM COATED ORAL EVERY 12 HOURS SCHEDULED
Status: DISCONTINUED | OUTPATIENT
Start: 2019-06-03 | End: 2019-06-04 | Stop reason: HOSPADM

## 2019-06-03 RX ORDER — CLONIDINE HYDROCHLORIDE 0.1 MG/1
0.1 TABLET ORAL EVERY 4 HOURS PRN
Status: DISCONTINUED | OUTPATIENT
Start: 2019-06-03 | End: 2019-06-04 | Stop reason: HOSPADM

## 2019-06-03 RX ORDER — WARFARIN SODIUM 7.5 MG/1
7.5 TABLET ORAL
Status: DISCONTINUED | OUTPATIENT
Start: 2019-06-03 | End: 2019-06-04 | Stop reason: HOSPADM

## 2019-06-03 RX ADMIN — WARFARIN SODIUM 7.5 MG: 7.5 TABLET ORAL at 17:21

## 2019-06-03 RX ADMIN — GADOBENATE DIMEGLUMINE 20 ML: 529 INJECTION, SOLUTION INTRAVENOUS at 13:45

## 2019-06-03 RX ADMIN — VITAMIN E CAP 400 UNIT 400 UNITS: 400 CAP at 09:07

## 2019-06-03 RX ADMIN — CLONIDINE HYDROCHLORIDE 0.1 MG: 0.1 TABLET ORAL at 17:21

## 2019-06-03 RX ADMIN — SODIUM CHLORIDE, PRESERVATIVE FREE 3 ML: 5 INJECTION INTRAVENOUS at 09:08

## 2019-06-03 RX ADMIN — SODIUM CHLORIDE, PRESERVATIVE FREE 3 ML: 5 INJECTION INTRAVENOUS at 20:56

## 2019-06-03 RX ADMIN — METOPROLOL TARTRATE 50 MG: 50 TABLET ORAL at 20:56

## 2019-06-03 RX ADMIN — Medication 1 TABLET: at 09:08

## 2019-06-03 RX ADMIN — LOSARTAN POTASSIUM 50 MG: 50 TABLET, FILM COATED ORAL at 09:07

## 2019-06-03 RX ADMIN — METOPROLOL TARTRATE 25 MG: 25 TABLET, FILM COATED ORAL at 09:07

## 2019-06-03 RX ADMIN — VITAM B12 50 MCG: 100 TAB at 09:07

## 2019-06-03 NOTE — THERAPY TREATMENT NOTE
Acute Care - Physical Therapy Treatment Note  [facility]     Patient Name: Sangita Viramontes  : 1933  MRN: [id number]  Today's Date: 6/3/2019  Onset of Illness/Injury or Date of Surgery: 19  Date of Referral to PT: 19  Referring Physician: CRYSTAL Thompson    Admit Date: 2019    Visit Dx:    ICD-10-CM ICD-9-CM   1. Altered mental status, unspecified altered mental status type R41.82 780.97   2. Subtherapeutic international normalized ratio (INR) R79.1 790.92   3. Decreased activities of daily living (ADL) R68.89 780.99   4. Impaired mobility Z74.09 799.89     Patient Active Problem List   Diagnosis   • Hypertension   • Varicose veins of both legs with edema   • Paroxysmal atrial fibrillation (CMS/HCC)   • PAD (peripheral artery disease) (CMS/HCC)   • Antiplatelet or antithrombotic long-term use   • Anticoagulant long-term use   • Venous insufficiency (chronic) (peripheral)   • Altered mental status       Therapy Treatment    Rehabilitation Treatment Summary     Row Name 19 1526             Treatment Time/Intention    Discipline  physical therapy assistant  -NW      Document Type  therapy note (daily note)  -NW      Subjective Information  complains of;weakness;pain  -NW2      Patient Effort  good  -NW2      Comment  confusion, decreased safety awareness  -NW2      Existing Precautions/Restrictions  fall  -NW2      Recorded by [NW] Stefany López, [facility] 19 1537  [NW2] Stefany López, [facility] 19 1549      Row Name 19 1526             Safety Issues, Functional Mobility    Safety Issues Affecting Function (Mobility)  safety precaution awareness  -NW      Impairments Affecting Function (Mobility)  balance;cognition  -NW      Recorded by [NW] Stefany López [facility] 19 1549      Row Name 19 1526             Bed Mobility Assessment/Treatment    Supine-Sit [place] (Bed Mobility)  verbal cues;minimum assist (75% patient effort);moderate assist (50% patient  effort);1 person assist;2 person assist  -NW      Sit-Supine [place] (Bed Mobility)  -- up in chair  -NW      Bed Mobility, Safety Issues  cognitive deficits limit understanding  -NW      Assistive Device (Bed Mobility)  head of bed elevated;draw sheet  -NW      Recorded by [NW] Stefany López, PTA 06/03/19 1549      Row Name 06/03/19 1526             Sit-Stand Transfer    Sit-Stand [place] (Transfers)  verbal cues;moderate assist (50% patient effort);2 person assist upon standing pt had posterior lean cues for wt shif fwd  -NW      Assistive Device (Sit-Stand Transfers)  walker, front-wheeled sit-stand x3  -NW2      Recorded by [NW] Stefany López, PTA 06/03/19 1555  [NW2] Stefany López, PTA 06/03/19 1549      Row Name 06/03/19 1526             Stand-Sit Transfer    Stand-Sit [place] (Transfers)  verbal cues;minimum assist (75% patient effort);2 person assist  -NW      Assistive Device (Stand-Sit Transfers)  walker, front-wheeled  -NW      Recorded by [NW] Stefany López, PTA 06/03/19 1549      Row Name 06/03/19 1526             Gait/Stairs Assessment/Training    [place] Level (Gait)  verbal cues;minimum assist (75% patient effort);1 person assist;2 person assist follow w/ chair  -NW      Assistive Device (Gait)  walker, front-wheeled  -NW2      Distance in Feet (Gait)  20  -NW      Deviations/Abnormal Patterns (Gait)  gait speed decreased  -NW      Bilateral Gait Deviations  forward flexed posture  -NW      Recorded by [NW] Stefany López, PTA 06/03/19 1555  [NW2] Stefany López, PTA 06/03/19 1549      Row Name 06/03/19 1526             Positioning and Restraints    Pre-Treatment Position  in bed  -NW      Post Treatment Position  chair  -NW      In Chair  reclined;call light within reach;encouraged to call for assist;exit alarm on;notified nsg  -NW      Recorded by [NW] Stefany López, PTA 06/03/19 1555      Row Name 06/03/19 1526             Pain Scale: Numbers  Pre/Post-Treatment    Pain Scale: Numbers, Pretreatment  0/10 - no pain  -NW      Recorded by [NW] Stefany López, PTA 06/03/19 1555      Row Name                Wound 06/02/19 0000 Left clavicle abrasion    Wound - Properties Group Date first assessed: 06/02/19 [AR] Time first assessed: 0000 [AR] Side: Left [AR] Location: clavicle [AR] Type: abrasion [AR] Recorded by:  [AR] Shonna Kimball RN 06/02/19 0308      User Key  (r) = Recorded By, (t) = Taken By, (c) = Cosigned By    Initials Name Effective Dates Discipline    NW Stefany López, PTA 08/02/16 -  PT    AR Shonna Kimball RN 08/02/16 -  Nurse          Wound 06/02/19 0000 Left clavicle abrasion (Active)   Closure Open to air 6/2/2019  8:00 PM   Edges irregular 6/2/2019  8:00 PM   Drainage Amount none 6/2/2019  8:00 PM           Physical Therapy Education     Title: PT OT SLP Therapies (In Progress)     Topic: Physical Therapy (Done)     Point: Mobility training (Done)     Learning Progress Summary           Patient Acceptance, E,D, DU,VU by  at 6/2/2019  1:56 PM    Comment:  benefits of PT and POC, call for assist OOB   Family Acceptance, E,D, DU,VU by  at 6/2/2019  1:56 PM    Comment:  benefits of PT and POC, call for assist OOB                   Point: Precautions (Done)     Learning Progress Summary           Patient Acceptance, E,D, DU,VU by  at 6/2/2019  1:56 PM    Comment:  benefits of PT and POC, call for assist OOB   Family Acceptance, E,D, DU,VU by  at 6/2/2019  1:56 PM    Comment:  benefits of PT and POC, call for assist OOB                               User Key     Initials Effective Dates Name Provider Type Discipline     08/02/16 -  Beny Allison, PT Physical Therapist PT                PT Recommendation and Plan        Outcome Measures     Row Name 06/03/19 1500 06/02/19 1355 06/02/19 1300       How much help from another person do you currently need...    Turning from your back to your side while in flat bed without using  bedrails?  4  -NW  4  -LH  --    Moving from lying on back to sitting on the side of a flat bed without bedrails?  3  -NW  3  -LH  --    Moving to and from a bed to a chair (including a wheelchair)?  3  -NW  3  -LH  --    Standing up from a chair using your arms (e.g., wheelchair, bedside chair)?  3  -NW  3  -LH  --    Climbing 3-5 steps with a railing?  3  -NW  3  -LH  --    To walk in hospital room?  3  -NW  3  -LH  --    AM-PAC 6 Clicks Score  19  -NW  19  -LH  --       How much help from another is currently needed...    Putting on and taking off regular lower body clothing?  --  --  2  -TR    Bathing (including washing, rinsing, and drying)  --  --  2  -TR    Toileting (which includes using toilet bed pan or urinal)  --  --  3  -TR    Putting on and taking off regular upper body clothing  --  --  3  -TR    Taking care of personal grooming (such as brushing teeth)  --  --  3  -TR    Eating meals  --  --  4  -TR    Score  --  --  17  -TR       Functional Assessment    Outcome Measure Options  AM-PAC 6 Clicks Basic Mobility (PT)  -NW  AM-PAC 6 Clicks Basic Mobility (PT)  -LH  AM-PAC 6 Clicks Daily Activity (OT)  -TR      User Key  (r) = Recorded By, (t) = Taken By, (c) = Cosigned By    Initials Name Provider Type     Beny Allison, PT Physical Therapist    Stefany Chavez PTA Physical Therapy Assistant    Danita Deluca, OTR/L Occupational Therapist         Time Calculation:   PT Charges     Row Name 06/03/19 1556             Time Calculation    Start Time  1526  -NW      Stop Time  1545  -NW      Time Calculation (min)  19 min  -NW      PT Received On  06/03/19  -NW      PT Goal Re-Cert Due Date  06/12/19  -NW         Time Calculation- PT    Total Timed Code Minutes- PT  19 minute(s)  -NW         Timed Charges    74040 - Gait Training Minutes   19  -NW        User Key  (r) = Recorded By, (t) = Taken By, (c) = Cosigned By    Initials Name Provider Type     Stefany López PTA Physical Therapy  Assistant        Therapy Charges for Today     Code Description Service Date Service Provider Modifiers Qty    60808367826 HC GAIT TRAINING EA 15 MIN 6/3/2019 Stefany López, PTA GP 1          PT G-Codes  Outcome Measure Options: AM-PAC 6 Clicks Basic Mobility (PT)  AM-PAC 6 Clicks Score: 19  Score: 17    Stefany López, PTA  6/3/2019

## 2019-06-03 NOTE — PLAN OF CARE
Problem: Patient Care Overview  Goal: Plan of Care Review  Outcome: Ongoing (interventions implemented as appropriate)   06/03/19 0529   Coping/Psychosocial   Plan of Care Reviewed With patient   Plan of Care Review   Progress no change   OTHER   Outcome Summary VSS, except for bp, md aware, new orders obtained. Pt has been confused most of shift, alert to person only. Getting up out of bed to go home, not understanding to call for assist. No c/o pain voiced. Safety maintained.       Problem: Fall Risk (Adult)  Goal: Absence of Fall  Outcome: Ongoing (interventions implemented as appropriate)      Problem: Confusion, Acute (Adult)  Goal: Cognitive/Functional Impairments Minimized  Outcome: Ongoing (interventions implemented as appropriate)    Goal: Safety  Outcome: Ongoing (interventions implemented as appropriate)

## 2019-06-03 NOTE — PROGRESS NOTES
Continued Stay Note   [place]     Patient Name: Sangita Viramontes  MRN: [id number]  Today's Date: 6/3/2019    Admit Date: 6/1/2019    Discharge Plan     Row Name 06/03/19 1519       Plan    Plan Comments  PRECERT IS COMPLETE AND HAS APPROVED FOR PT TO GO TO [facility] TOMORROW 6-4-19. SENT APRN A MESSAGE TO INFORM. WILL FOLLOW UP TOMORROW TO SEE IF PT IS READY FOR DC.     Row Name 06/03/19 1246       Plan    Plan Comments  PRECERT WAS STARTED FOR [facility] TODAY. AWAIT INSURANCE DECISION .        Discharge Codes    No documentation.             TONJA Allison

## 2019-06-03 NOTE — PROGRESS NOTES
Continued Stay Note   [place]     Patient Name: Sangita Viramontes  MRN: [id number]  Today's Date: 6/3/2019    Admit Date: 6/1/2019    Discharge Plan     Row Name 06/03/19 1246       Plan    Plan Comments  PRECERT WAS STARTED FOR [facility] TODAY. AWAIT INSURANCE DECISION .        Discharge Codes    No documentation.             TONJA Allison

## 2019-06-03 NOTE — PROGRESS NOTES
[facility]  JAYNE Worthington M.D.  CRYSTAL Thompson      Internal Medicine Progress Note    6/3/2019   9:50 AM    Name:  Sangita Viramontes  MRN:    [id number]     Acct:     [id number]   Room:  [address] [place] [address] Day: 0     Admit Date: 6/1/2019  3:43 PM  PCP: Floyd Peres MD    Subjective:     C/C:   Chief Complaint   Patient presents with   • Altered Mental Status       Interval History: Status:  Improved. Resting in bed. No family at bedside. Patient alert and oriented to person, place and time. Still disoriented to situation and presenting symptoms. Nursing reports patient extremely weak and requires maximum assist of 2 people for any mobility attempts. Blood pressure elevated.     Review of Systems   Constitution: Positive for weakness. Negative for chills, decreased appetite, malaise/fatigue, weight gain and weight loss.   HENT: Negative for congestion, ear discharge, hoarse voice and tinnitus.    Eyes: Negative for blurred vision, discharge, visual disturbance and visual halos.   Cardiovascular: Negative for chest pain, claudication, dyspnea on exertion, irregular heartbeat, leg swelling, orthopnea and paroxysmal nocturnal dyspnea.   Respiratory: Negative for cough, shortness of breath, sputum production and wheezing.    Endocrine: Negative for cold intolerance, heat intolerance and polyuria.   Hematologic/Lymphatic: Negative for adenopathy. Does not bruise/bleed easily.   Skin: Negative for dry skin, itching and suspicious lesions.   Musculoskeletal: Positive for falls. Negative for arthritis, back pain, joint pain, muscle weakness and myalgias.   Gastrointestinal: Negative for abdominal pain, constipation, diarrhea, dysphagia and hematemesis.   Genitourinary: Negative for bladder incontinence, dysuria and frequency.   Neurological: Negative for aphonia, disturbances in coordination and dizziness.   Psychiatric/Behavioral: Negative for altered mental status, depression, memory  "loss and substance abuse. The patient does not have insomnia and is not nervous/anxious.          Medications:     Allergies:   Allergies   Allergen Reactions   • Pradaxa [Dabigatran Etexilate Mesylate] Other (See Comments)     \"Turned legs black\"   • Other Rash     PT STATES PLASTIC GLOVES BROKE HIS HANDS OUT, HE DENIES REACTION TO TAPE OR BANDAIDS       Current Meds:   Current Facility-Administered Medications:   •  acetaminophen (TYLENOL) tablet 500 mg, 500 mg, Oral, Q6H PRN, Steff Hinojosa, APRN  •  acetaminophen (TYLENOL) tablet 650 mg, 650 mg, Oral, Q4H PRN, GinnyosSteff bowlinge, APRN  •  cyancobalamin (VITAMIN B-12) tablet 50 mcg, 50 mcg, Oral, Daily, Steff Hinojosa, APRN, 50 mcg at 06/03/19 0907  •  diphenhydrAMINE (BENADRYL) capsule 25 mg, 25 mg, Oral, Q6H PRN, Steff Hinojosa, APRN  •  losartan (COZAAR) tablet 50 mg, 50 mg, Oral, Q24H, Ginnyostawnya, Steff Blank, APRN, 50 mg at 06/03/19 0907  •  magnesium hydroxide (MILK OF MAGNESIA) suspension 2400 mg/10mL 10 mL, 10 mL, Oral, Daily PRN, Steff Hinojosa, APRN  •  metoprolol tartrate (LOPRESSOR) tablet 25 mg, 25 mg, Oral, Q12H, Steff Hinojosa, APRN, 25 mg at 06/03/19 0907  •  OCUVITE-LUTEIN (OCUVITE) tablet 1 tablet, 1 tablet, Oral, Daily, Steff Hinojosa, APRN, 1 tablet at 06/03/19 0908  •  sodium chloride 0.9 % flush 3 mL, 3 mL, Intravenous, Q12H, Steff Hinojosa, APRN, 3 mL at 06/03/19 0908  •  sodium chloride 0.9 % flush 3-10 mL, 3-10 mL, Intravenous, PRN, Steff Hinojosae, APRN  •  vitamin E capsule 400 Units, 400 Units, Oral, Daily, Steff Hinojosa APRN, 400 Units at 06/03/19 0907  •  warfarin (COUMADIN) tablet 5 mg, 5 mg, Oral, Every Other Day, Anatoliy Peres MD, 5 mg at 06/02/19 1804  •  warfarin (COUMADIN) tablet 7.5 mg, 7.5 mg, Oral, Every Other Day, Anatoliy Peres MD    Data:     Code Status:    Code Status and Medical Interventions:   Ordered at: 06/02/19 1037     Level Of Support " "Discussed With:    Patient     Code Status:    CPR     Medical Interventions (Level of Support Prior to Arrest):    Full       Family History   Problem Relation Age of Onset   • Heart attack Mother    • Heart failure Father        Social History     Socioeconomic History   • Marital status:      Spouse name: Not on file   • Number of children: Not on file   • Years of education: Not on file   • Highest education level: Not on file   Tobacco Use   • Smoking status: Former Smoker   • Smokeless tobacco: Former User     Types: Chew   Substance and Sexual Activity   • Alcohol use: No   • Drug use: No       Vitals:  BP (!) 184/109   Pulse 92   Temp 97.3 °F (36.3 °C) (Oral)   Resp 18   Ht 182.9 cm (72\")   Wt 95.8 kg (211 lb 3.2 oz)   SpO2 95%   BMI 28.64 kg/m²   Temp (24hrs), Av °F (36.7 °C), Min:97.3 °F (36.3 °C), Max:99.2 °F (37.3 °C)            I/O (24Hr):    Intake/Output Summary (Last 24 hours) at 6/3/2019 0950  Last data filed at 6/3/2019 0900  Gross per 24 hour   Intake --   Output 1325 ml   Net -1325 ml       Labs and imaging:      Recent Results (from the past 12 hour(s))   Basic Metabolic Panel    Collection Time: 19  4:17 AM   Result Value Ref Range    Glucose 92 70 - 100 mg/dL    BUN 10 5 - 21 mg/dL    Creatinine 0.70 0.50 - 1.40 mg/dL    Sodium 142 135 - 145 mmol/L    Potassium 3.6 3.5 - 5.3 mmol/L    Chloride 106 98 - 110 mmol/L    CO2 28.0 24.0 - 31.0 mmol/L    Calcium 9.2 8.4 - 10.4 mg/dL    eGFR Non African Amer 107 >60 mL/min/1.73    BUN/Creatinine Ratio 14.3 7.0 - 25.0    Anion Gap 8.0 4.0 - 13.0 mmol/L   CBC Auto Differential    Collection Time: 19  4:17 AM   Result Value Ref Range    WBC 11.22 (H) 4.80 - 10.80 10*3/mm3    RBC 5.71 4.80 - 5.90 10*6/mm3    Hemoglobin 16.1 14.0 - 18.0 g/dL    Hematocrit 48.1 40.0 - 52.0 %    MCV 84.2 82.0 - 95.0 fL    MCH 28.2 28.0 - 32.0 pg    MCHC 33.5 33.0 - 36.0 g/dL    RDW 13.7 12.0 - 15.0 %    RDW-SD 41.7 40.0 - 54.0 fl    MPV 11.3 6.0 " - 12.0 fL    Platelets 179 130 - 400 10*3/mm3    Neutrophil % 72.7 39.0 - 78.0 %    Lymphocyte % 15.9 15.0 - 45.0 %    Monocyte % 9.5 4.0 - 12.0 %    Eosinophil % 1.1 0.0 - 4.0 %    Basophil % 0.4 0.0 - 2.0 %    Immature Grans % 0.4 0.0 - 5.0 %    Neutrophils, Absolute 8.15 1.87 - 8.40 10*3/mm3    Lymphocytes, Absolute 1.78 0.72 - 4.86 10*3/mm3    Monocytes, Absolute 1.07 0.19 - 1.30 10*3/mm3    Eosinophils, Absolute 0.12 0.00 - 0.70 10*3/mm3    Basophils, Absolute 0.05 0.00 - 0.20 10*3/mm3    Immature Grans, Absolute 0.05 0.00 - 0.05 10*3/mm3    nRBC 0.0 0.0 - 0.2 /100 WBC   Protime-INR    Collection Time: 06/03/19  8:55 AM   Result Value Ref Range    Protime 17.0 (H) 11.9 - 14.6 Seconds    INR 1.34 (H) 0.91 - 1.09         Physical Examination:        Physical Exam   Constitutional: He is oriented to person, place, and time. He appears well-developed and well-nourished.   HENT:   Head: Normocephalic and atraumatic.   Nose: Nose normal.   Mouth/Throat: Oropharynx is clear and moist.   Eyes: EOM are normal. Pupils are equal, round, and reactive to light.   Neck: Normal range of motion. Neck supple.   Cardiovascular: Normal rate, normal heart sounds and intact distal pulses. An irregularly irregular rhythm present.   Pulmonary/Chest: Effort normal and breath sounds normal.   Abdominal: Soft. Bowel sounds are normal.   Musculoskeletal: Normal range of motion.   Profound generalized weakness   Neurological: He is alert and oriented to person, place, and time. He has normal reflexes.   Intermittent confusion   Skin: Skin is warm and dry.   Skin graft to left lower extremity   Psychiatric: He has a normal mood and affect. His behavior is normal.   Nursing note and vitals reviewed.        Assessment:            Altered mental status    Past Medical History:   Diagnosis Date   • A-fib (CMS/[facility])    • Hypertension    • PAD (peripheral artery disease) (CMS/HCC)    • Sleep apnea    • Varicose vein of leg         Plan:         1. Altered mental status  2. Ataxia  3. Atrial fibrillation  4. Chronic anticoagulation  5. Subtherapeutic INR  6. HTN    Continue current treatment. Monitor counts. Increase activity as tolerated. Maintain patient safety. Increase lopressor. Increase coumadin - will need to consider if we're going to continue coumadin given ataxia with recurrent falls. Await further workup and treatment recommendations per neurology. Add prn clonidine.       Electronically signed by CRYSTAL Morse on 6/3/2019 at 9:50 AM

## 2019-06-04 VITALS
TEMPERATURE: 98.1 F | DIASTOLIC BLOOD PRESSURE: 88 MMHG | OXYGEN SATURATION: 92 % | RESPIRATION RATE: 20 BRPM | WEIGHT: 207.1 LBS | HEART RATE: 82 BPM | BODY MASS INDEX: 28.05 KG/M2 | HEIGHT: 72 IN | SYSTOLIC BLOOD PRESSURE: 134 MMHG

## 2019-06-04 LAB
ANION GAP SERPL CALCULATED.3IONS-SCNC: 8 MMOL/L (ref 4–13)
BASOPHILS # BLD AUTO: 0.04 10*3/MM3 (ref 0–0.2)
BASOPHILS NFR BLD AUTO: 0.4 % (ref 0–2)
BUN BLD-MCNC: 16 MG/DL (ref 5–21)
BUN/CREAT SERPL: 23.2 (ref 7–25)
CALCIUM SPEC-SCNC: 8.7 MG/DL (ref 8.4–10.4)
CHLORIDE SERPL-SCNC: 110 MMOL/L (ref 98–110)
CO2 SERPL-SCNC: 22 MMOL/L (ref 24–31)
CREAT BLD-MCNC: 0.69 MG/DL (ref 0.5–1.4)
DEPRECATED RDW RBC AUTO: 41.3 FL (ref 40–54)
EOSINOPHIL # BLD AUTO: 0 10*3/MM3 (ref 0–0.7)
EOSINOPHIL NFR BLD AUTO: 0 % (ref 0–4)
ERYTHROCYTE [DISTWIDTH] IN BLOOD BY AUTOMATED COUNT: 13.8 % (ref 12–15)
GFR SERPL CREATININE-BSD FRML MDRD: 109 ML/MIN/1.73
GLUCOSE BLD-MCNC: 118 MG/DL (ref 70–100)
HCT VFR BLD AUTO: 48 % (ref 40–52)
HGB BLD-MCNC: 15.9 G/DL (ref 14–18)
IMM GRANULOCYTES # BLD AUTO: 0.06 10*3/MM3 (ref 0–0.05)
IMM GRANULOCYTES NFR BLD AUTO: 0.6 % (ref 0–5)
INR PPP: 1.36 (ref 0.91–1.09)
LYMPHOCYTES # BLD AUTO: 0.63 10*3/MM3 (ref 0.72–4.86)
LYMPHOCYTES NFR BLD AUTO: 6.1 % (ref 15–45)
MCH RBC QN AUTO: 27.5 PG (ref 28–32)
MCHC RBC AUTO-ENTMCNC: 33.1 G/DL (ref 33–36)
MCV RBC AUTO: 82.9 FL (ref 82–95)
MONOCYTES # BLD AUTO: 0.8 10*3/MM3 (ref 0.19–1.3)
MONOCYTES NFR BLD AUTO: 7.7 % (ref 4–12)
NEUTROPHILS # BLD AUTO: 8.86 10*3/MM3 (ref 1.87–8.4)
NEUTROPHILS NFR BLD AUTO: 85.2 % (ref 39–78)
NRBC BLD AUTO-RTO: 0 /100 WBC (ref 0–0.2)
PLATELET # BLD AUTO: 143 10*3/MM3 (ref 130–400)
PMV BLD AUTO: 11.3 FL (ref 6–12)
POTASSIUM BLD-SCNC: 3.7 MMOL/L (ref 3.5–5.3)
PROTHROMBIN TIME: 17.2 SECONDS (ref 11.9–14.6)
RBC # BLD AUTO: 5.79 10*6/MM3 (ref 4.8–5.9)
SODIUM BLD-SCNC: 140 MMOL/L (ref 135–145)
WBC NRBC COR # BLD: 10.39 10*3/MM3 (ref 4.8–10.8)

## 2019-06-04 PROCEDURE — 85025 COMPLETE CBC W/AUTO DIFF WBC: CPT | Performed by: NURSE PRACTITIONER

## 2019-06-04 PROCEDURE — 97116 GAIT TRAINING THERAPY: CPT

## 2019-06-04 PROCEDURE — 85610 PROTHROMBIN TIME: CPT | Performed by: NURSE PRACTITIONER

## 2019-06-04 PROCEDURE — G0378 HOSPITAL OBSERVATION PER HR: HCPCS

## 2019-06-04 PROCEDURE — 99214 OFFICE O/P EST MOD 30 MIN: CPT | Performed by: PSYCHIATRY & NEUROLOGY

## 2019-06-04 PROCEDURE — 80048 BASIC METABOLIC PNL TOTAL CA: CPT | Performed by: NURSE PRACTITIONER

## 2019-06-04 RX ORDER — METOPROLOL TARTRATE 50 MG/1
50 TABLET, FILM COATED ORAL EVERY 12 HOURS SCHEDULED
Start: 2019-06-04 | End: 2020-06-19

## 2019-06-04 RX ORDER — CLONIDINE HYDROCHLORIDE 0.1 MG/1
0.1 TABLET ORAL EVERY 4 HOURS PRN
Start: 2019-06-04

## 2019-06-04 RX ADMIN — METOPROLOL TARTRATE 50 MG: 50 TABLET ORAL at 08:15

## 2019-06-04 RX ADMIN — VITAMIN E CAP 400 UNIT 400 UNITS: 400 CAP at 08:14

## 2019-06-04 RX ADMIN — VITAM B12 50 MCG: 100 TAB at 08:15

## 2019-06-04 RX ADMIN — Medication 1 TABLET: at 08:14

## 2019-06-04 RX ADMIN — SODIUM CHLORIDE, PRESERVATIVE FREE 3 ML: 5 INJECTION INTRAVENOUS at 08:15

## 2019-06-04 RX ADMIN — LOSARTAN POTASSIUM 50 MG: 50 TABLET, FILM COATED ORAL at 08:14

## 2019-06-04 NOTE — PLAN OF CARE
Problem: Patient Care Overview  Goal: Plan of Care Review  Outcome: Ongoing (interventions implemented as appropriate)   06/04/19 0737   Coping/Psychosocial   Plan of Care Reviewed With patient   Plan of Care Review   Progress declining   OTHER   Outcome Summary VSS, again except for bp. Pt continue to be confused, alert to person only. Up w/2 assist, not ambulating well. Was incontinent of urine. No c/o of pain voiced. Safety maintained.       Problem: Fall Risk (Adult)  Goal: Absence of Fall  Outcome: Ongoing (interventions implemented as appropriate)      Problem: Confusion, Acute (Adult)  Goal: Cognitive/Functional Impairments Minimized  Outcome: Ongoing (interventions implemented as appropriate)    Goal: Safety  Outcome: Ongoing (interventions implemented as appropriate)      Problem: Skin Injury Risk (Adult)  Goal: Identify Related Risk Factors and Signs and Symptoms  Outcome: Ongoing (interventions implemented as appropriate)    Goal: Skin Health and Integrity  Outcome: Ongoing (interventions implemented as appropriate)

## 2019-06-04 NOTE — PROGRESS NOTES
Continued Stay Note   [facility]     Patient Name: Sangita Viramontes  MRN: [id number]  Today's Date: 6/4/2019    Admit Date: 6/1/2019    Discharge Plan     Row Name 06/04/19 1129       Plan    Final Discharge Disposition Code  03 - skilled nursing facility (SNF)    Final Note  PT IS BEING DCD TO [facility] SKILLED LEVEL TODAY. NH AWARE. CALL REPORT [phone]. DC SUMMARY HAS BEEN FAXED        Discharge Codes    No documentation.       Expected Discharge Date and Time     Expected Discharge Date Expected Discharge Time    Jun 4, 2019             TONJA Allison

## 2019-06-04 NOTE — DISCHARGE SUMMARY
"[facility]  Floyd Peres M.D.  BETTY Peres M.D.  CRYSTAL Thompson    Internal Medicine Discharge Summary    Patient ID: Sangita Viramontes  MRN: [id number]     Acct:  [id number]       Patient's PCP: Floyd Peres MD    Admit Date: 6/1/2019     Discharge Date:   6/4/19    Admitting Physician: Anatoliy Peres MD    Discharge Physician: CRYSTAL Morse     Active Discharge Diagnoses:  1. Altered mental status  2. Ataxia  3. Atrial fibrillation  4. Chronic anticoagulation  5. Subtherapeutic INR  6. HTN    Hospital Problems    Altered mental status     Past Medical History:   Diagnosis Date   • A-fib (CMS/HCC)    • Hypertension    • PAD (peripheral artery disease) (CMS/[facility])    • Sleep apnea    • Varicose vein of leg        The patient was seen and examined on the day of discharge and this discharge summary is in conjunction with any daily progress note from day of discharge.    Code Status:    Code Status and Medical Interventions:   Ordered at: 06/02/19 1037     Level Of Support Discussed With:    Patient     Code Status:    CPR     Medical Interventions (Level of Support Prior to Arrest):    Full       Hospital Course: The patient had been in his usual state of health when he developed acute confusion and a fall yesterday. The patient's son reports some worsening confusion over the past week with worsening ataxia. The patient suffered a hip fracture about 4 months ago and has had a gradual decline since that time, but had returned from SNF to independent living at home. The patient woke yesterday and made himself breakfast as he typically does. He had apparently forgotten he had family visiting from [place] and when they got to his house, he went to his bedroom to change clothes. Family was communicating through the door with the patient who reported that he was fine and was \"trying.\" He did not tell them that he had fallen in the floor. The door was apparently locked and " after an hour, family had to break through the door where the found him in the floor with worsening confusion. The patient was brought to ER for evaluation and treatment. He was found to have confusion, but workup was negative. Family did not feel comfortable with the patient returning to home and he was admitted to our service.   The patient has remained intermittently confused with worsening of confusion later in the day. Workup has essentially been within normal limits with no evidence of infection, mass or infarct and no epileptiform activity. EEG showed generalized slowing consistent with dementia/encephalopathy. His coumadin dose has been adjusted and he has remained subtherapeutic. Will need to consider R/B/A of continued anticoagulation in the future due to worsening confusion and ataxia with falls. The patient has remained generally weak and has been slow to progress with therapy. At this point, he is felt stable for discharge to SNF for continued rehabilitation efforts. There is a possibility that this will turn into a long term situation as the dementia progresses. Neurology has recommended an outpatient sleep study to rule out sleep apnea.         Consults:  Dr. Carrington Segura (neurology)    Disposition: SNF    Discharged Condition: Stable    Follow Up: Floyd Peres MD  [address] [place] [address] [address] [place] [address]  [phone]            Diet: Diet Regular    Discharge Medications:      Discharge Medications      New Medications      Instructions Start Date   CloNIDine 0.1 MG tablet  Commonly known as:  CATAPRES   0.1 mg, Oral, Every 4 Hours PRN      metoprolol tartrate 50 MG tablet  Commonly known as:  LOPRESSOR   50 mg, Oral, Every 12 Hours Scheduled         Changes to Medications      Instructions Start Date   warfarin 7.5 MG tablet  Commonly known as:  COUMADIN  What changed:  Another medication with the same name was removed. Continue taking this medication, and follow the directions you see  here.   7.5 mg, Oral, Every Other Day, Alternate dose with 5 mg every other day.         Continue These Medications      Instructions Start Date   acetaminophen 500 MG tablet  Commonly known as:  TYLENOL   500 mg, Oral, Every 6 Hours PRN      fish oil 1000 MG capsule capsule   1,000 mg, Oral, Nightly      irbesartan 150 MG tablet  Commonly known as:  AVAPRO   150 mg, Oral, Nightly      VISION-JUVENTINO PRESERVE PO   1 tablet, Oral, Daily      vitamin B-12 1000 MCG tablet  Commonly known as:  CYANOCOBALAMIN   1,000 mcg, Oral, Daily      vitamin E 400 UNIT capsule   400 Units, Oral, Daily             Time Spent on discharge is  32 minutes in patient examination, evaluation, patient/family counseling as well as medication reconciliation, prescriptions for required medications, discharge plan and follow up.     Electronically signed by CRYSTAL Morse on 6/4/2019 at 9:39 AM

## 2019-06-04 NOTE — PLAN OF CARE
Problem: Patient Care Overview  Goal: Plan of Care Review  Outcome: Ongoing (interventions implemented as appropriate)   06/04/19 9629   Coping/Psychosocial   Plan of Care Reviewed With patient;family   Plan of Care Review   Progress improving   OTHER   Outcome Summary Pt confused. C/o no pain. Voiding per urinal and incontinant. DC to [facility] per ambulance today. VSS. Safety maintained.

## 2019-06-04 NOTE — PROGRESS NOTES
Continued Stay Note  [facility]     Patient Name: Sangita Viramontes  MRN: [id number]  Today's Date: 6/4/2019    Admit Date: 6/1/2019    Discharge Plan     Row Name 06/04/19 1243       Plan    Final Note  FAMILY IS AWARE OF PT DC. FAMILY AGREES TO EITHER [facility] ([phone]) OR [facility] ([phone])    Row Name 06/04/19 1127       Plan    Final Discharge Disposition Code  03 - skilled nursing facility (SNF)    Final Note  PT IS BEING DCD TO [facility] SKILLED LEVEL TODAY. NH AWARE. CALL REPORT [phone]. DC SUMMARY HAS BEEN FAXED        Discharge Codes    No documentation.       Expected Discharge Date and Time     Expected Discharge Date Expected Discharge Time    Jun 4, 2019             TONJA Allison

## 2019-06-04 NOTE — PROGRESS NOTES
Neurology Progress Note      Date of admission: 6/1/2019  3:43 PM  Date of visit: 6/3/2019    Chief Complaint:  F/u alerted mental status    Subjective     Subjective:     No family present Patient very friendly and cooperative.  Medications:  Current Facility-Administered Medications   Medication Dose Route Frequency Provider Last Rate Last Dose   • acetaminophen (TYLENOL) tablet 500 mg  500 mg Oral Q6H PRN Steff Hinojosa, APRN       • acetaminophen (TYLENOL) tablet 650 mg  650 mg Oral Q4H PRN Steff Hinojosa, APRN       • CloNIDine (CATAPRES) tablet 0.1 mg  0.1 mg Oral Q4H PRN Steff Hinojosa, APRN   0.1 mg at 06/03/19 1721   • cyancobalamin (VITAMIN B-12) tablet 50 mcg  50 mcg Oral Daily Steff Hinojosa, APRN   50 mcg at 06/03/19 0907   • losartan (COZAAR) tablet 50 mg  50 mg Oral Q24H Steff Hinojosa, APRN   50 mg at 06/03/19 0907   • magnesium hydroxide (MILK OF MAGNESIA) suspension 2400 mg/10mL 10 mL  10 mL Oral Daily PRN Steff Hinojosa APRN       • metoprolol tartrate (LOPRESSOR) tablet 50 mg  50 mg Oral Q12H Steff Hinojosa, APRN   50 mg at 06/03/19 2056   • OCUVITE-LUTEIN (OCUVITE) tablet 1 tablet  1 tablet Oral Daily Steff Hinojosa APRN   1 tablet at 06/03/19 0908   • sodium chloride 0.9 % flush 3 mL  3 mL Intravenous Q12H Steff Hinojosa, APRN   3 mL at 06/03/19 2056   • sodium chloride 0.9 % flush 3-10 mL  3-10 mL Intravenous PRN Steff Hinojosa APRN       • vitamin E capsule 400 Units  400 Units Oral Daily Steff Hinojosa, APRN   400 Units at 06/03/19 0907   • warfarin (COUMADIN) tablet 7.5 mg  7.5 mg Oral Daily Steff Hinojosa APRN   7.5 mg at 06/03/19 1721       Review of Systems:   -A 14 point review of systems is completed and is negative as best as can be determined by his answers    Objective     Objective      Vital Signs  Temp:  [97.3 °F (36.3 °C)-98.9 °F (37.2 °C)] 98.9 °F (37.2 °C)  Heart Rate:  [80-98] 93  Resp:  [14-24]  24  BP: (142-184)/() 142/93    Physical Exam:    HEENT:  Neck supple  CVS:  Regular rate and rhythm.  No murmurs  Carotid Examination:  No bruits  Lungs:  Clear to auscultation  Abdomen:  Non-tender, Non-distended  Extremities:  No signs of peripheral edema    Neurologic Exam:    -Awake, Alert, Oriented to person , place month year.   -Will talk of people he knows and give details on them but cannot gorge to understand why he is here  -No aphasia  -No dysarthria  -Follows simple  commands    Cranial nerves II through XII intact.  CN II:  Visual fields full.  Pupils equally reactive to light  CN III, IV, VI:  Extraocular Muscles full with no signs of nystagmus  CN V:  Facial sensory is symmetric   CN VII:  Facial motor symmetric  CN VIII:  Gross hearing intact bilaterally  CN IX/X:  Palate elevates symmetrically  CN XI:  Shoulder shrug symmetric  CN XII:  Tongue is midline on protrusion        Motor: (strength out of 5:  1= minimal movement, 2 = movement in plane of gravity, 3 = movement against gravity, 4 = movement against some resistance, 5 = full strength)    -Right Upper Ext: Proximal: 5 Distal: 5  -Left Upper Ext: Proximal: 5 Distal: 5    -Right Lower Ext: Proximal: 5 Distal: 5  -Left Lower Ext: Proximal: 5 Distal: 5    DTR:  2+ throughout in all four extremities    Sensory:  -Intact to light touch  Coordination/Gait:  -No ataxia  Needing 2 person assist to walk     Results Review:    I reviewed the patient's new clinical results.    Lab Results (last 24 hours)     Procedure Component Value Units Date/Time    Blood Culture - Blood, Arm, Left [[id number]] Collected:  06/01/19 1715    Specimen:  Blood from Arm, Left Updated:  06/03/19 1746     Blood Culture No growth at 2 days    Blood Culture - Blood, Arm, Right [[id number]] Collected:  06/01/19 1553    Specimen:  Blood from Arm, Right Updated:  06/03/19 1646     Blood Culture No growth at 2 days    Protime-INR [[id number]]  (Abnormal) Collected:  06/03/19 0829     Specimen:  Blood Updated:  06/03/19 0935     Protime 17.0 Seconds      INR 1.34    Basic Metabolic Panel [[id number]]  (Normal) Collected:  06/03/19 0417    Specimen:  Blood Updated:  06/03/19 0517     Glucose 92 mg/dL      BUN 10 mg/dL      Creatinine 0.70 mg/dL      Sodium 142 mmol/L      Potassium 3.6 mmol/L      Chloride 106 mmol/L      CO2 28.0 mmol/L      Calcium 9.2 mg/dL      eGFR Non African Amer 107 mL/min/1.73      BUN/Creatinine Ratio 14.3     Anion Gap 8.0 mmol/L     Narrative:       GFR Normal >60  Chronic Kidney Disease <60  Kidney Failure <15    CBC & Differential [[id number]] Collected:  06/03/19 0417    Specimen:  Blood Updated:  06/03/19 0505    Narrative:       The following orders were created for panel order CBC & Differential.  Procedure                               Abnormality         Status                     ---------                               -----------         ------                     CBC Auto Differential[[id number]]        Abnormal            Final result                 Please view results for these tests on the individual orders.    CBC Auto Differential [[id number]]  (Abnormal) Collected:  06/03/19 0417    Specimen:  Blood Updated:  06/03/19 0505     WBC 11.22 10*3/mm3      RBC 5.71 10*6/mm3      Hemoglobin 16.1 g/dL      Hematocrit 48.1 %      MCV 84.2 fL      MCH 28.2 pg      MCHC 33.5 g/dL      RDW 13.7 %      RDW-SD 41.7 fl      MPV 11.3 fL      Platelets 179 10*3/mm3      Neutrophil % 72.7 %      Lymphocyte % 15.9 %      Monocyte % 9.5 %      Eosinophil % 1.1 %      Basophil % 0.4 %      Immature Grans % 0.4 %      Neutrophils, Absolute 8.15 10*3/mm3      Lymphocytes, Absolute 1.78 10*3/mm3      Monocytes, Absolute 1.07 10*3/mm3      Eosinophils, Absolute 0.12 10*3/mm3      Basophils, Absolute 0.05 10*3/mm3      Immature Grans, Absolute 0.05 10*3/mm3      nRBC 0.0 /100 WBC         Imaging Results (last 24 hours)     Procedure Component Value Units Date/Time    MRI Brain  With & Without Contrast [[id number]] Collected:  06/03/19 1409     Updated:  06/03/19 1416    Narrative:       EXAMINATION:  MRI BRAIN WITHOUT AND WITH CONTRAST-  6/3/2019 12:49 PM  CDT     HISTORY: Episodic confusion. Eval for seizure. R41.82-Altered mental  status, unspecified; R79.1-Abnormal coagulation profile; R68.89-Other  general symptoms and signs; Z74.09-Other reduced mobility.     TECHNIQUE: Multiplanar imaging was performed in a high field magnet  before and after IV gadolinium contrast administration.     COMPARISON: Brain CT on 06/01/2019.     FINDINGS: There is quite a bit of susceptibility artifact overlying the  left frontal region. In retrospect, there is a tiny metallic fragment  within the skin/subcutaneous fat extracranially on CT in this area.  There is no evidence of acute infarct on the diffusion-weighted  sequence. There is mild to moderate T2 high signal within the  hemispheric white matter. There is moderate atrophy with associated  ventricular dilatation. There is severe motion artifact on multiple  imaging sequences. There are no areas of abnormal contrast enhancement.       Impression:       1. Severe motion artifact on multiple sequences. This limits evaluation.  2. No evidence of acute infarct.  3. Moderate atrophy with associated ventricular prominence.  4. T2 high signal within the hemispheric white matter is nonspecific and  most likely due to chronic small vessel disease.  5. Susceptibility artifact in the left frontal region related to a small  piece of metal within the skin/subcutaneous fat seen best on the CT  images.     This report was finalized on 06/03/2019 14:13 by Dr. Francisco Javier Brown MD.          MRI shows significant artifact over left frontal lobe and also motion artifact but no acute stroke.   EEG: diffusely slow   Assessment/Plan     Hospital Problem List      Altered mental status    Impression:  1. Likely underlying dementia but this should be determined as outpatient  and he should undergo neuro psych testing  Unfortunately I was unable to discuss with family regarding any recent changes in him.  He is now off of his Benadryl   Plan:  Outpatient neurology for further evaluation  Agree with further therapy and he is to go to [facility] tomorrow  Would benefit from sleep apnea treatment      Joseline Segura MD  06/03/19  11:41 PM

## 2019-06-04 NOTE — PLAN OF CARE
Problem: Patient Care Overview  Goal: Plan of Care Review  Outcome: Ongoing (interventions implemented as appropriate)   06/04/19 1024   Coping/Psychosocial   Plan of Care Reviewed With patient   Plan of Care Review   Progress improving   OTHER   Outcome Summary Pt requires min assist of 2 for bed mobility with assist of bed rails and draw sheet. Min/mod x 2 assist for sit to stand transfers. Ambulated up to 50' at a time cga/min assist with rolling walker. Plans to discharge to SNF today for continued rehab.

## 2019-06-04 NOTE — THERAPY TREATMENT NOTE
Acute Care - Physical Therapy Treatment Note  [facility]     Patient Name: Sangita Viramontes  : 1933  MRN: [id number]  Today's Date: 2019  Onset of Illness/Injury or Date of Surgery: 19  Date of Referral to PT: 19  Referring Physician: CRYSTAL Thompson    Admit Date: 2019    Visit Dx:    ICD-10-CM ICD-9-CM   1. Altered mental status, unspecified altered mental status type R41.82 780.97   2. Subtherapeutic international normalized ratio (INR) R79.1 790.92   3. Decreased activities of daily living (ADL) R68.89 780.99   4. Impaired mobility Z74.09 799.89     Patient Active Problem List   Diagnosis   • Hypertension   • Varicose veins of both legs with edema   • Paroxysmal atrial fibrillation (CMS/HCC)   • PAD (peripheral artery disease) (CMS/HCC)   • Antiplatelet or antithrombotic long-term use   • Anticoagulant long-term use   • Venous insufficiency (chronic) (peripheral)   • Altered mental status       Therapy Treatment    Rehabilitation Treatment Summary     Row Name 19 1010             Treatment Time/Intention    Discipline  physical therapy assistant  -CW      Document Type  therapy note (daily note)  -CW      Subjective Information  no complaints  -CW      Mode of Treatment  physical therapy  -CW      Patient/Family Observations  family in room  -CW2      Comment  Confusion, decreased safety awareness  -CW      Existing Precautions/Restrictions  fall  -CW      Recorded by [CW] Chloé Burgos, PTA 19 1024  [CW2] Chloé Burgos, [facility] 19 1036      Row Name 19 1010             Bed Mobility Assessment/Treatment    Supine-Sit [place] (Bed Mobility)  minimum assist (75% patient effort);verbal cues  -CW      Bed Mobility, Safety Issues  decreased use of arms for pushing/pulling;decreased use of legs for bridging/pushing  -CW      Assistive Device (Bed Mobility)  bed rails;head of bed elevated;draw sheet  -CW      Recorded by [CW] Chloé Burgos,  [facility] 06/04/19 1024      Row Name 06/04/19 1010             Sit-Stand Transfer    Sit-Stand [place] (Transfers)  moderate assist (50% patient effort);2 person assist;verbal cues  -CW      Assistive Device (Sit-Stand Transfers)  walker, front-wheeled  -CW2      Recorded by [CW] Chloé Burgos, [facility] 06/04/19 1036  [CW2] Chloé Burgos, [facility] 06/04/19 1024      Row Name 06/04/19 1010             Stand-Sit Transfer    Stand-Sit [place] (Transfers)  moderate assist (50% patient effort);2 person assist;verbal cues  -CW      Assistive Device (Stand-Sit Transfers)  walker, front-wheeled  -CW2      Recorded by [CW] Chloé Burgos, [facility] 06/04/19 1036  [CW2] Chloé Burgos, [facility] 06/04/19 1024      Row Name 06/04/19 1010             Gait/Stairs Assessment/Training    [place] Level (Gait)  contact guard;minimum assist (75% patient effort);2 person assist;verbal cues  -CW      Assistive Device (Gait)  walker, front-wheeled followed with chair  -CW      Distance in Feet (Gait)  50  -CW      Pattern (Gait)  step-to  -CW      Deviations/Abnormal Patterns (Gait)  bilateral deviations;base of support, wide;nancy decreased;gait speed decreased;stride length decreased  -CW      Bilateral Gait Deviations  forward flexed posture;heel strike decreased B feet externally rotated  -CW      Comment (Gait/Stairs)  followed with chair  -CW      Recorded by [CW] Chloé Burgos, [facility] 06/04/19 1024      Row Name 06/04/19 1010             Positioning and Restraints    Pre-Treatment Position  in bed  -CW      Post Treatment Position  chair  -CW      In Chair  reclined;call light within reach;encouraged to call for assist;exit alarm on;with family/caregiver;notified nsg  -CW      Recorded by [CW] Chloé Burgos, [facility] 06/04/19 1024      Row Name 06/04/19 1010             Pain Scale: Numbers Pre/Post-Treatment    Pain Scale: Numbers, Pretreatment  0/10 - no pain  -CW      Pain Scale: Numbers, Post-Treatment  0/10 -  no pain  -CW      Recorded by [CW] Chloé Burgos, PTA 06/04/19 1024      Row Name                Wound 06/02/19 0000 Left clavicle abrasion    Wound - Properties Group Date first assessed: 06/02/19 [AR] Time first assessed: 0000 [AR] Side: Left [AR] Location: clavicle [AR] Type: abrasion [AR] Recorded by:  [AR] Shonna Kimball RN 06/02/19 0308      User Key  (r) = Recorded By, (t) = Taken By, (c) = Cosigned By    Initials Name Effective Dates Discipline    CW Imelda Burgosallyson DAVIS, PTA 06/22/15 -  PT    AR Shonna Kimball RN 08/02/16 -  Nurse                   Physical Therapy Education     Title: PT OT SLP Therapies (In Progress)     Topic: Physical Therapy (Done)     Point: Mobility training (Done)     Learning Progress Summary           Patient Acceptance, E,D, DU,VU by  at 6/2/2019  1:56 PM    Comment:  benefits of PT and POC, call for assist OOB   Family Acceptance, E,D, DU,VU by  at 6/2/2019  1:56 PM    Comment:  benefits of PT and POC, call for assist OOB                   Point: Precautions (Done)     Learning Progress Summary           Patient Acceptance, E,D, DU,VU by  at 6/2/2019  1:56 PM    Comment:  benefits of PT and POC, call for assist OOB   Family Acceptance, E,D, DU,VU by  at 6/2/2019  1:56 PM    Comment:  benefits of PT and POC, call for assist OOB                               User Key     Initials Effective Dates Name Provider Type Discipline     08/02/16 -  Beny Allison, PT Physical Therapist PT                PT Recommendation and Plan     Plan of Care Reviewed With: patient  Progress: improving  Outcome Summary: Pt requires min assist of 2 for bed mobility with assist of bed rails and draw sheet.  Min/mod x 2 assist for sit to stand transfers.  Ambulated up to 50' at a time cga/miin assist with rolling walker.  Plans to discharge to SNF today for continued rehab.  Outcome Measures     Row Name 06/03/19 1500 06/02/19 1355 06/02/19 1300       How much help from another  person do you currently need...    Turning from your back to your side while in flat bed without using bedrails?  4  -NW  4  -LH  --    Moving from lying on back to sitting on the side of a flat bed without bedrails?  3  -NW  3  -LH  --    Moving to and from a bed to a chair (including a wheelchair)?  3  -NW  3  -LH  --    Standing up from a chair using your arms (e.g., wheelchair, bedside chair)?  3  -NW  3  -LH  --    Climbing 3-5 steps with a railing?  3  -NW  3  -LH  --    To walk in hospital room?  3  -NW  3  -LH  --    AM-PAC 6 Clicks Score  19  -NW  19  -LH  --       How much help from another is currently needed...    Putting on and taking off regular lower body clothing?  --  --  2  -TR    Bathing (including washing, rinsing, and drying)  --  --  2  -TR    Toileting (which includes using toilet bed pan or urinal)  --  --  3  -TR    Putting on and taking off regular upper body clothing  --  --  3  -TR    Taking care of personal grooming (such as brushing teeth)  --  --  3  -TR    Eating meals  --  --  4  -TR    Score  --  --  17  -TR       Functional Assessment    Outcome Measure Options  AM-PAC 6 Clicks Basic Mobility (PT)  -NW  AM-PAC 6 Clicks Basic Mobility (PT)  -  AM-PAC 6 Clicks Daily Activity (OT)  -TR      User Key  (r) = Recorded By, (t) = Taken By, (c) = Cosigned By    Initials Name Provider Type     Beny Allison, PT Physical Therapist    NW Stefany López, PTA Physical Therapy Assistant    TR Danita Davila, OTR/L Occupational Therapist         Time Calculation:   PT Charges     Row Name 06/04/19 1037             Time Calculation    Start Time  1010  -CW      Stop Time  1036  -CW      Time Calculation (min)  26 min  -CW      PT Received On  06/04/19  -CW      PT Goal Re-Cert Due Date  06/12/19  -CW         Time Calculation- PT    Total Timed Code Minutes- PT  26 minute(s)  -CW        User Key  (r) = Recorded By, (t) = Taken By, (c) = Cosigned By    Initials Name Provider Type      Chloé Burgos PTA Physical Therapy Assistant        Therapy Charges for Today     Code Description Service Date Service Provider Modifiers Qty    97776831432 HC GAIT TRAINING EA 15 MIN 6/4/2019 Chloé Burgos PTA GP 2          PT G-Codes  Outcome Measure Options: AM-PAC 6 Clicks Basic Mobility (PT)  AM-PAC 6 Clicks Score: 19  Score: 17    Chloé Burgos PTA  6/4/2019

## 2019-06-05 NOTE — THERAPY DISCHARGE NOTE
Acute Care - Physical Therapy Discharge Summary  [facility]       Patient Name: Sangita Viramontes  : 1933  MRN: [id number]    Today's Date: 2019  Onset of Illness/Injury or Date of Surgery: 19    Date of Referral to PT: 19  Referring Physician: CRYSTAL Thompson      Admit Date: 2019      PT Recommendation and Plan    Visit Dx:    ICD-10-CM ICD-9-CM   1. Altered mental status, unspecified altered mental status type R41.82 780.97   2. Subtherapeutic international normalized ratio (INR) R79.1 790.92   3. Decreased activities of daily living (ADL) R68.89 780.99   4. Impaired mobility Z74.09 799.89       Outcome Measures     Row Name 19 1500 19 1355 19 1300       How much help from another person do you currently need...    Turning from your back to your side while in flat bed without using bedrails?  4  -NW  4  -LH  --    Moving from lying on back to sitting on the side of a flat bed without bedrails?  3  -NW  3  -LH  --    Moving to and from a bed to a chair (including a wheelchair)?  3  -NW  3  -LH  --    Standing up from a chair using your arms (e.g., wheelchair, bedside chair)?  3  -NW  3  -LH  --    Climbing 3-5 steps with a railing?  3  -NW  3  -LH  --    To walk in hospital room?  3  -NW  3  -LH  --    AM-PAC 6 Clicks Score  19  -NW  19  -LH  --       How much help from another is currently needed...    Putting on and taking off regular lower body clothing?  --  --  2  -TR    Bathing (including washing, rinsing, and drying)  --  --  2  -TR    Toileting (which includes using toilet bed pan or urinal)  --  --  3  -TR    Putting on and taking off regular upper body clothing  --  --  3  -TR    Taking care of personal grooming (such as brushing teeth)  --  --  3  -TR    Eating meals  --  --  4  -TR    Score  --  --  17  -TR       Functional Assessment    Outcome Measure Options  AM-PAC 6 Clicks Basic Mobility (PT)  -NW  AM-PAC 6 Clicks Basic Mobility (PT)  -  AM-PAC  6 Clicks Daily Activity (OT)  -TR      User Key  (r) = Recorded By, (t) = Taken By, (c) = Cosigned By    Initials Name Provider Type     Beny Allison, PT Physical Therapist    NW Stefany López, WENDY Physical Therapy Assistant    Danita Deluca, OTR/L Occupational Therapist              Rehab Goal Summary     Row Name 06/05/19 0735             Bed Mobility Goal 1 (PT)    Activity/Assistive Device (Bed Mobility Goal 1, PT)  bed mobility activities, all  -MF      [place] Level/Cues Needed (Bed Mobility Goal 1, PT)  standby assist  -MF      Time Frame (Bed Mobility Goal 1, PT)  by discharge  -MF      Progress/Outcomes (Bed Mobility Goal 1, PT)  goal not met  -MF         Transfer Goal 1 (PT)    Activity/Assistive Device (Transfer Goal 1, PT)  sit-to-stand/stand-to-sit;bed-to-chair/chair-to-bed;walker, rolling  -MF      [place] Level/Cues Needed (Transfer Goal 1, PT)  standby assist  -MF      Time Frame (Transfer Goal 1, PT)  by discharge  -MF      Progress/Outcome (Transfer Goal 1, PT)  goal not met  -MF         Gait Training Goal 1 (PT)    Activity/Assistive Device (Gait Training Goal 1, PT)  gait (walking locomotion);assistive device use  -MF      [place] Level (Gait Training Goal 1, PT)  standby assist  -MF      Distance (Gait Goal 1, PT)  40 feet  -MF      Time Frame (Gait Training Goal 1, PT)  by discharge  -MF      Progress/Outcome (Gait Training Goal 1, PT)  goal not met  -MF        User Key  (r) = Recorded By, (t) = Taken By, (c) = Cosigned By    Initials Name Provider Type Jovi     Argentina Escobar PTA Physical Therapy Assistant PT              PT Discharge Summary  Anticipated Discharge Disposition (PT): skilled nursing facility  Reason for Discharge: Discharge from facility  Outcomes Achieved: Unable to make functional progress toward goals at this time  Discharge Destination: SNF      Argentina Escobar PTA   6/5/2019

## 2019-06-05 NOTE — THERAPY DISCHARGE NOTE
Acute Care - Occupational Therapy Discharge Summary  [facility]     Patient Name: Sangita Viramontes  : 1933  MRN: [id number]    Today's Date: 2019  Onset of Illness/Injury or Date of Surgery: 19    Date of Referral to OT: 19  Referring Physician: CRYSTAL Thompson      Admit Date: 2019        OT Recommendation and Plan    Visit Dx:    ICD-10-CM ICD-9-CM   1. Altered mental status, unspecified altered mental status type R41.82 780.97   2. Subtherapeutic international normalized ratio (INR) R79.1 790.92   3. Decreased activities of daily living (ADL) R68.89 780.99   4. Impaired mobility Z74.09 799.89               Rehab Goal Summary     Row Name 19 0735             Bed Mobility Goal 1 (PT)    Activity/Assistive Device (Bed Mobility Goal 1, PT)  bed mobility activities, all  -MF      [place] Level/Cues Needed (Bed Mobility Goal 1, PT)  standby assist  -MF      Time Frame (Bed Mobility Goal 1, PT)  by discharge  -MF      Progress/Outcomes (Bed Mobility Goal 1, PT)  goal not met  -MF         Transfer Goal 1 (PT)    Activity/Assistive Device (Transfer Goal 1, PT)  sit-to-stand/stand-to-sit;bed-to-chair/chair-to-bed;walker, rolling  -MF      [place] Level/Cues Needed (Transfer Goal 1, PT)  standby assist  -MF      Time Frame (Transfer Goal 1, PT)  by discharge  -MF      Progress/Outcome (Transfer Goal 1, PT)  goal not met  -MF         Gait Training Goal 1 (PT)    Activity/Assistive Device (Gait Training Goal 1, PT)  gait (walking locomotion);assistive device use  -MF      [place] Level (Gait Training Goal 1, PT)  standby assist  -MF      Distance (Gait Goal 1, PT)  40 feet  -MF      Time Frame (Gait Training Goal 1, PT)  by discharge  -MF      Progress/Outcome (Gait Training Goal 1, PT)  goal not met  -MF         Transfer Goal 1 (OT)    Activity/Assistive Device (Transfer Goal 1, OT)  sit-to-stand/stand-to-sit;bed-to-chair/chair-to-bed;toilet;shower chair  -TS       [place] Level/Cues Needed (Transfer Goal 1, OT)  supervision required  -TS      Time Frame (Transfer Goal 1, OT)  by discharge;long term goal (LTG)  -TS      Progress/Outcome (Transfer Goal 1, OT)  goal not met  -TS         Bathing Goal 1 (OT)    Activity/Assistive Device (Bathing Goal 1, OT)  upper body bathing;lower body bathing;shower chair  -TS      [place] Level/Cues Needed (Bathing Goal 1, OT)  contact guard assist  -TS      Time Frame (Bathing Goal 1, OT)  by discharge;long term goal (LTG)  -TS      Progress/Outcomes (Bathing Goal 1, OT)  goal not met  -TS         Dressing Goal 1 (OT)    Activity/Assistive Device (Dressing Goal 1, OT)  lower body dressing  -TS      [place]/Cues Needed (Dressing Goal 1, OT)  supervision required  -TS      Time Frame (Dressing Goal 1, OT)  by discharge;long term goal (LTG)  -TS      Progress/Outcome (Dressing Goal 1, OT)  goal not met  -TS        User Key  (r) = Recorded By, (t) = Taken By, (c) = Cosigned By    Initials Name Provider Type Discipline    TS Danita Lowe, JOSE/L Occupational Therapy Assistant OT    Argentina Franco, WENDY Physical Therapy Assistant PT          Outcome Measures     Row Name 06/03/19 1500 06/02/19 1355 06/02/19 1300       How much help from another person do you currently need...    Turning from your back to your side while in flat bed without using bedrails?  4  -NW  4  -LH  --    Moving from lying on back to sitting on the side of a flat bed without bedrails?  3  -NW  3  -LH  --    Moving to and from a bed to a chair (including a wheelchair)?  3  -NW  3  -LH  --    Standing up from a chair using your arms (e.g., wheelchair, bedside chair)?  3  -NW  3  -LH  --    Climbing 3-5 steps with a railing?  3  -NW  3  -LH  --    To walk in hospital room?  3  -NW  3  -LH  --    AM-PAC 6 Clicks Score  19  -NW  19  -LH  --       How much help from another is currently needed...    Putting on and taking off regular lower body  clothing?  --  --  2  -TR    Bathing (including washing, rinsing, and drying)  --  --  2  -TR    Toileting (which includes using toilet bed pan or urinal)  --  --  3  -TR    Putting on and taking off regular upper body clothing  --  --  3  -TR    Taking care of personal grooming (such as brushing teeth)  --  --  3  -TR    Eating meals  --  --  4  -TR    Score  --  --  17  -TR       Functional Assessment    Outcome Measure Options  AM-PAC 6 Clicks Basic Mobility (PT)  -  AM-PAC 6 Clicks Basic Mobility (PT)  -  AM-PAC 6 Clicks Daily Activity (OT)  -TR      User Key  (r) = Recorded By, (t) = Taken By, (c) = Cosigned By    Initials Name Provider Type     Beny Allison, PT Physical Therapist    NW Stefany López, PTA Physical Therapy Assistant    TR Danita Davila, OTR/L Occupational Therapist          Therapy Suggested Charges     Code   Minutes Charges    None                 OT Discharge Summary  Reason for Discharge: Discharge from facility  Outcomes Achieved: Refer to plan of care for updates on goals achieved  Discharge Destination: [facility]      NEGAR Baker  6/5/2019

## 2019-06-06 ENCOUNTER — TELEPHONE (OUTPATIENT)
Dept: PODIATRY | Facility: CLINIC | Age: 84
End: 2019-06-06

## 2019-06-06 LAB
BACTERIA SPEC AEROBE CULT: NORMAL
BACTERIA SPEC AEROBE CULT: NORMAL

## 2019-06-06 NOTE — TELEPHONE ENCOUNTER
Attempted to reach patient to remind him of his appointment with Dr. Navneet Brizuela tomorrow at 8:45 am. Unable to leave message on either phone.

## 2019-06-07 ENCOUNTER — TELEPHONE (OUTPATIENT)
Dept: PODIATRY | Facility: CLINIC | Age: 84
End: 2019-06-07

## 2019-06-17 ENCOUNTER — TELEPHONE (OUTPATIENT)
Dept: VASCULAR SURGERY | Facility: CLINIC | Age: 84
End: 2019-06-17

## 2019-07-11 ENCOUNTER — HOSPITAL ENCOUNTER (OUTPATIENT)
Dept: ULTRASOUND IMAGING | Facility: HOSPITAL | Age: 84
Discharge: HOME | End: 2019-07-11
Admitting: NURSE PRACTITIONER

## 2019-07-11 ENCOUNTER — HOSPITAL ENCOUNTER (OUTPATIENT)
Dept: ULTRASOUND IMAGING | Facility: HOSPITAL | Age: 84
Discharge: HOME | End: 2019-07-11

## 2019-07-11 DIAGNOSIS — I65.23 BILATERAL CAROTID ARTERY STENOSIS: ICD-10-CM

## 2019-07-11 DIAGNOSIS — I73.9 PAD (PERIPHERAL ARTERY DISEASE) (HCC): ICD-10-CM

## 2019-07-11 PROCEDURE — 93923 UPR/LXTR ART STDY 3+ LVLS: CPT

## 2019-07-11 PROCEDURE — 93880 EXTRACRANIAL BILAT STUDY: CPT

## 2019-07-11 PROCEDURE — 93880 EXTRACRANIAL BILAT STUDY: CPT | Performed by: SURGERY

## 2019-07-11 PROCEDURE — 93924 LWR XTR VASC STDY BILAT: CPT | Performed by: SURGERY

## 2019-07-22 ENCOUNTER — TELEPHONE (OUTPATIENT)
Dept: VASCULAR SURGERY | Facility: CLINIC | Age: 84
End: 2019-07-22

## 2019-07-22 NOTE — TELEPHONE ENCOUNTER
Spoke with Mr Viramontes reminding him of his appointment for Tuesday, July 23rd, 2019 at 945 am with Dr Orr. I did advise him there was a possibility he would see Svitlana ROJAS as Dr Orr has surgery that morning and Mr Viramontes confirmed he would be here.

## 2019-07-23 ENCOUNTER — OFFICE VISIT (OUTPATIENT)
Dept: VASCULAR SURGERY | Facility: CLINIC | Age: 84
End: 2019-07-23

## 2019-07-23 VITALS
BODY MASS INDEX: 28.09 KG/M2 | DIASTOLIC BLOOD PRESSURE: 80 MMHG | HEART RATE: 85 BPM | HEIGHT: 72 IN | OXYGEN SATURATION: 94 % | SYSTOLIC BLOOD PRESSURE: 140 MMHG

## 2019-07-23 DIAGNOSIS — I73.9 PAD (PERIPHERAL ARTERY DISEASE) (HCC): Primary | ICD-10-CM

## 2019-07-23 DIAGNOSIS — I83.893 VARICOSE VEINS OF BOTH LEGS WITH EDEMA: ICD-10-CM

## 2019-07-23 DIAGNOSIS — I10 ESSENTIAL HYPERTENSION: ICD-10-CM

## 2019-07-23 DIAGNOSIS — I65.23 BILATERAL CAROTID ARTERY STENOSIS: ICD-10-CM

## 2019-07-23 PROCEDURE — 99214 OFFICE O/P EST MOD 30 MIN: CPT | Performed by: SURGERY

## 2019-07-23 RX ORDER — WARFARIN SODIUM 5 MG/1
5 TABLET ORAL
COMMUNITY
End: 2020-06-18

## 2019-07-23 NOTE — PROGRESS NOTES
"7/23/2019     Floyd Peres MD   [address] [place] [address]      Sangita Viramontes  1/5/1933    Chief Complaint   Patient presents with   • Follow-up     1 Year Follow Up For Bilateral Carotid Artery Stenosis and Peripheral Artery Disease. Test 807662 US pad ankle / brach ind ext comp and US pad carotid bilateral. Patient denies any stroke like symptoms.        Dear Floyd Peres MD       HPI  I had the pleasure of seeing your patient Sangita Viramontes in the office today.    As you recall, Sangita Viramontes is a 86 y.o.  male who you have recently seen.  He states he was seen for \"fluid buildup in his legs\".  He seems to be referring to varicose veins to his right lower extremity.  He has evidence of stasis dermatitis and swelling to bilateral lower extremities.  He denies any cramping or aching of his legs.  He denies any claudicating symptoms to his lower extremities. He has been wrapping his legs with ACE wraps, as he has trouble getting stockings on.  He did have noninvasive testing performed today, which I did review in office.        Review of Systems   Constitutional: Negative.    HENT: Negative.    Eyes: Negative.    Respiratory: Negative.    Cardiovascular: Positive for leg swelling (varicose veins to bialteral lower extremities, right greater than left).   Gastrointestinal: Negative.    Endocrine: Negative.    Genitourinary: Negative.    Musculoskeletal: Negative.    Skin: Positive for color change.   Allergic/Immunologic: Negative.    Neurological: Negative.    Hematological: Negative.    Psychiatric/Behavioral: Negative.    All other systems reviewed and are negative.      /80 (BP Location: Left arm, Patient Position: Sitting, Cuff Size: Adult)   Pulse 85   Ht 182.9 cm (72\")   SpO2 94%   BMI 28.09 kg/m²   Physical Exam   Constitutional: He is oriented to person, place, and time. He appears well-developed and well-nourished.   HENT:   Head: Normocephalic and atraumatic. "   Eyes: Pupils are equal, round, and reactive to light. No scleral icterus.   Neck: Normal range of motion. Neck supple. No thyromegaly present.   Cardiovascular: Normal rate, regular rhythm and normal heart sounds.   Doppler signals  Varicose veins to bilateral lower extremities, right greater than left with a large varicosity to calf  Stasis dermatitis to bilateral lower extremtiies   Pulmonary/Chest: Effort normal and breath sounds normal.   Abdominal: Soft. Bowel sounds are normal.   Musculoskeletal: Normal range of motion.   Neurological: He is alert and oriented to person, place, and time.   Skin: Skin is warm and dry.   Psychiatric: He has a normal mood and affect. His behavior is normal. Judgment and thought content normal.   Nursing note and vitals reviewed.      Us Carotid Bilateral    Result Date: 7/11/2019  Narrative: History: Carotid occlusive disease      Impression: Impression: 1. There is less than 50% stenosis of the right internal carotid artery. 2. There is less than 50% stenosis of the left internal carotid artery. 3. Antegrade flow is demonstrated in bilateral vertebral arteries.  Comments: Bilateral carotid vertebral arterial duplex scan was performed.  Grayscale imaging shows intimal thickening and calcified elements at the carotid bifurcation. The right internal carotid artery peak systolic velocity is 103 cm/sec. The end-diastolic velocity is 27.5 cm/sec. The right ICA/CCA ratio is approximately 3.28 . These findings correlate with less than 50% stenosis of the right internal carotid artery.  Grayscale imaging shows intimal thickening and calcified elements at the carotid bifurcation. The left internal carotid artery peak systolic velocity is 75.0 cm/sec. The end-diastolic velocity is 17.0 cm/sec. The left ICA/CCA ratio is approximately 2.28 . These findings correlate with less than 50% stenosis of the left internal carotid artery.  Antegrade flow is demonstrated in bilateral vertebral  arteries.  This report was finalized on 07/11/2019 13:24 by Dr. Tom Crowe MD.    Us Ankle / Brachial Indices Extremity Complete    Result Date: 7/11/2019  Narrative:  History: PAD  Comments: Bilateral lower extremity arterial with multi-level pulse volume recordings and segmental pressures were performed at rest and stress.  The right ankle/brachial index is 0.52. Doppler waveforms are monophasic and dampened and PVR waveform at the ankle is moderately dampened. Toe/brachial index is 0.36. PVR waveform at the first digit is severely dampened.These findings are consistent with severe arterial insufficiency of the right lower extremity at rest.  The left ankle/brachial index is 0.87. Doppler waveforms are biphasic and PVR waveform at the ankle is normal-appearing with no significant dampening. Toe/brachial index is 0.75. PVR waveform of the first digit is mildly dampened. These findings are consistent with mild arterial insufficiency of the left lower extremity at rest.      Impression: Impression: 1. Severe arterial insufficiency of the right lower extremity at rest. 2. Mild arterial insufficiency of the left lower extremity at rest. 3. There is evidence of bilateral forefoot small vessel disease, right worse than left.   This report was finalized on 07/11/2019 13:26 by Dr. Tom Crowe MD.      Patient Active Problem List   Diagnosis   • Hypertension   • Varicose veins of both legs with edema   • Paroxysmal atrial fibrillation (CMS/HCC)   • PAD (peripheral artery disease) (CMS/[facility])   • Antiplatelet or antithrombotic long-term use   • Anticoagulant long-term use   • Venous insufficiency (chronic) (peripheral)   • Altered mental status         ICD-10-CM ICD-9-CM   1. PAD (peripheral artery disease) (CMS/HCC) I73.9 443.9   2. Essential hypertension I10 401.9   3. Bilateral carotid artery stenosis I65.23 433.10     433.30   4. Varicose veins of both legs with edema I83.893 454.8       Plan: After thoroughly  evaluating Sangita Viramontes, I believe the best course of action is to remain conservative from a vascular standpoint.  He denies any claudicating symptoms.  He does not wear compression stockings.  We have recommended he begin wearing compression stockings on a daily basis.   He does have severe arterial disease, however he is not having any claudicating symptoms.  We will see him back in 1 years with repeat noninvasive testing for continued surveillance, including ABIs and carotid duplex.   The patient can continue taking her current medication regimen as previously planned.  This was all discussed in full with complete understanding.    Thank you for allowing me to participate in the care of your patient.  Please do not hesitate with any questions or concerns.  I will keep you aware of any further encounters with Sangita Viramontes.        Sincerely yours,         DO Ja Lazo Ronald L, MD       Scribed for Dr. Hosea Orr by Svitlana Brewster APRN 7/23/2019

## 2019-07-23 NOTE — PATIENT INSTRUCTIONS
How to Use Compression Stockings  Compression stockings are elastic socks that squeeze the legs. They help to increase blood flow to the legs, decrease swelling in the legs, and reduce the chance of developing blood clots in the lower legs. Compression stockings are often used by people who:  · Are recovering from surgery.  · Have poor circulation in their legs.  · Are prone to getting blood clots in their legs.  · Have varicose veins.  · Sit or stay in bed for long periods of time.    How to use compression stockings  Before you put on your compression stockings:  · Make sure that they are the correct size. If you do not know your size, ask your health care provider.  · Make sure that they are clean, dry, and in good condition.  · Check them for rips and tears. Do not put them on if they are ripped or torn.    Put your stockings on first thing in the morning, before you get out of bed. Keep them on for as long as your health care provider advises. When you are wearing your stockings:  · Keep them as smooth as possible. Do not allow them to bunch up. It is especially important to prevent the stockings from bunching up around your toes or behind your knees.  · Do not roll the stockings downward and leave them rolled down. This can decrease blood flow to your leg.  · Change them right away if they become wet or dirty.    When you take off your stockings, inspect your legs and feet. Anything that does not seem normal may require medical attention. Look for:  · Open sores.  · Red spots.  · Swelling.    Information and tips  · Do not stop wearing your compression stockings without talking to your health care provider first.  · Wash your stockings every day with mild detergent in cold or warm water. Do not use bleach. Air-dry your stockings or dry them in a clothes dryer on low heat.  · Replace your stockings every 3-6 months.  · If skin moisturizing is part of your treatment plan, apply lotion or cream at night so that  your skin will be dry when you put on the stockings in the morning. It is harder to put the stockings on when you have lotion on your legs or feet.  Contact a health care provider if:  Remove your stockings and seek medical care if:  · You have a feeling of pins and needles in your feet or legs.  · You have any new changes in your skin.  · You have skin lesions that are getting worse.  · You have swelling or pain that is getting worse.    Get help right away if:  · You have numbness or tingling in your lower legs that does not get better right after you take the stockings off.  · Your toes or feet become cold and blue.  · You develop open sores or red spots on your legs that do not go away.  · You see or feel a warm spot on your leg.  · You have new swelling or soreness in your leg.  · You are short of breath or you have chest pain for no reason.  · You have a rapid or irregular heartbeat.  · You feel light-headed or dizzy.  This information is not intended to replace advice given to you by your health care provider. Make sure you discuss any questions you have with your health care provider.  Document Released: 10/15/2010 Document Revised: 05/17/2017 Document Reviewed: 11/25/2015  [employer] Interactive Patient Education © 2019 [employer]

## 2019-08-05 ENCOUNTER — OFFICE VISIT (OUTPATIENT)
Dept: NEUROLOGY | Facility: CLINIC | Age: 84
End: 2019-08-05

## 2019-08-05 VITALS
DIASTOLIC BLOOD PRESSURE: 70 MMHG | HEART RATE: 74 BPM | BODY MASS INDEX: 27.77 KG/M2 | HEIGHT: 72 IN | RESPIRATION RATE: 18 BRPM | WEIGHT: 205 LBS | SYSTOLIC BLOOD PRESSURE: 120 MMHG

## 2019-08-05 DIAGNOSIS — I10 ESSENTIAL (PRIMARY) HYPERTENSION: ICD-10-CM

## 2019-08-05 DIAGNOSIS — R41.3 MEMORY DIFFICULTY: Primary | ICD-10-CM

## 2019-08-05 DIAGNOSIS — G47.33 OSA (OBSTRUCTIVE SLEEP APNEA): ICD-10-CM

## 2019-08-05 PROCEDURE — 99215 OFFICE O/P EST HI 40 MIN: CPT | Performed by: PSYCHIATRY & NEUROLOGY

## 2019-08-05 RX ORDER — BUSPIRONE HYDROCHLORIDE 10 MG/1
10 TABLET ORAL DAILY
COMMUNITY
End: 2020-06-18 | Stop reason: SDUPTHER

## 2019-08-05 NOTE — PATIENT INSTRUCTIONS
Fall precautions and not to be up without assist.  Patient to get with PCP about weight and diet control

## 2019-08-05 NOTE — PROGRESS NOTES
Subjective   Sangita Viramontes, 1/5/1933, is a male who is being seen today for   Chief Complaint   Patient presents with   • Neurologic Problem     AMS       HISTORY OF PRESENT ILLNESS: Extended follow-up.  Patient with a history of memory difficulties increasing since February when he had a fracture of hip and surgery.  Patient was in the hospital in June 2019 with altered mental status.  Patient does not remember that situation and brought him to the hospital.  Patient apparently according to his brother went to his house bedroom and did not come out.  Patient while in the emergency room and hospitalization had MRI of the brain that showed artifact from a piece of metal in his scalp but otherwise no significant abnormality except for questionable white matter changes in atrophy.  CT head scan showed lacunar infarct that was old.  Patient has had previous noninvasive carotid ultrasound followed by Dr. Orr as well as lower extremity circulation test that were noncontributory  for the carotid ultrasound.  Patient has been getting some therapies but is unsure if she patient has had formal memory testing.  Patient's MMSE today is 25 or 30.  Patient is on Coumadin for atrial fibrillation.  Patient apparently has not had any falls with head trauma recently.  Patient denies any headaches.  Patient therapist has told the sister-in-law that patient  apparently been evaluated cognitively.    REVIEW OF SYSTEMS:   GENERAL: Blood pressure today 120/70 his left arm seated in same standing with pulse 74.  PULMONARY: Patient had previous history of ROBERTO not on any treatment at this time.  CVS: As above  GASTROINTESTINAL: No acute GI distress  GENITOURINARY: No acute  distress  GYN: Not applicable  MUSCULOSKELETAL: Patient has trouble walking secondary to left leg problems according to the patient  HEENT: No acute vision or hearing change  ENDOCRINE: No acute endocrine symptoms  PSYCHIATRIC: No acute psychiatric  symptoms  HEMATOLOGY: No anemia  SKIN: Patient has chronic stasis changes in the left greater than right lower extremity with deformities and large mass on the left shin area.  Family history reviewed and otherwise noncontributory  Social history: Patient denies smoking but does chew tobacco.  Patient denies alcohol or drug use.    PHYSICAL EXAMINATION:    GENERAL: Patient very tentative on attempt to stand and requires assist of 2  CRANIUM: Normocephalic/atraumatic  HEENT: No acute fundic normalities.  Pupils equal round reactive to light.       EYES: EOMs intact without nystagmus and fields full to confrontation       EARS: Tympanic membranes normal and hears tuning fork bilaterally       THROAT: No oropharynx abnormalities       NECK: No bruit/no lymphadenopathy  CHEST: No acute cardiopulmonary normalities by auscultation  ABDOMEN: Nondistended  EXTREMITIES: Pulses symmetrical  NEURO: Patient follows commands with some difficulty  SPEECH: Normal    CRANIAL NERVES: Motor/sensory about the face normal symmetric    MOTOR STRENGTH: Motor strength upper and lower extremities normal  STATION AND GAIT: Patient can stand with assist with attempted  2-3 steps.  Unable to do Romberg testing  CEREBELLAR: Patient can do finger-nose but really not able to do heel-to-shin well  SENSORY: Patient has decreased pin and vibration distal proximal lower extremities to above the ankles bilaterally.  Patient has normal pin and vibration upper extremities  REFLEXES: Reflexes absent throughout upper and lower extremity without clonus or Babinski      ASSESSMENT AND PLAN: Patient with history of memory difficulty and multiple other medical problems as above.  Patient to get formal memory testing if not already done to consider possibility of using acetylcholinesterase inhibitors if indicated.  Further blood work to be done as well.  I spent 40 minutes with this patient 30 minutes counseling.Patient's Body mass index is 27.8 kg/m². BMI is  above normal parameters. Recommendations include: referral to primary care.      Sangita was seen today for neurologic problem.    Diagnoses and all orders for this visit:    Memory difficulty  -     Comprehensive Metabolic Panel; Future  -     T4, Free; Future  -     Sedimentation Rate; Future  -     Magnesium; Future  -     Lipid Panel; Future  -     Ambulatory Referral to Speech Therapy    Essential (primary) hypertension   -     Lipid Panel; Future        Dictated utilizing Dragon voice recognition software

## 2019-08-22 DIAGNOSIS — I10 ESSENTIAL (PRIMARY) HYPERTENSION: ICD-10-CM

## 2019-08-22 DIAGNOSIS — R41.3 MEMORY DIFFICULTY: ICD-10-CM

## 2019-09-12 DIAGNOSIS — G47.33 OSA (OBSTRUCTIVE SLEEP APNEA): ICD-10-CM

## 2019-10-03 ENCOUNTER — OFFICE VISIT (OUTPATIENT)
Dept: NEUROLOGY | Facility: CLINIC | Age: 84
End: 2019-10-03

## 2019-10-03 VITALS
HEIGHT: 72 IN | BODY MASS INDEX: 27.77 KG/M2 | WEIGHT: 205 LBS | DIASTOLIC BLOOD PRESSURE: 60 MMHG | SYSTOLIC BLOOD PRESSURE: 94 MMHG | HEART RATE: 68 BPM

## 2019-10-03 DIAGNOSIS — F01.50 VASCULAR DEMENTIA WITHOUT BEHAVIORAL DISTURBANCE (HCC): ICD-10-CM

## 2019-10-03 DIAGNOSIS — I67.9 CEREBROVASCULAR DISEASE: ICD-10-CM

## 2019-10-03 DIAGNOSIS — I63.81 BASAL GANGLIA INFARCTION (HCC): Primary | ICD-10-CM

## 2019-10-03 PROCEDURE — 99214 OFFICE O/P EST MOD 30 MIN: CPT | Performed by: PHYSICIAN ASSISTANT

## 2019-10-15 NOTE — PROGRESS NOTES
Ephraim Viramontes is a 86 y.o. male is here today for follow-up.    Memory Loss   This is a chronic problem. The current episode started more than 1 year ago. The problem occurs constantly. The problem has been gradually worsening. Associated symptoms include fatigue and weakness. Nothing aggravates the symptoms. He has tried rest (Adaptive strategies) for the symptoms. The treatment provided no relief.        The following portions of the patient's history were reviewed and updated as appropriate: allergies, current medications, past family history, past medical history, past social history, past surgical history and problem list.    Review of Systems   Constitutional: Positive for activity change and fatigue.   HENT: Negative.    Eyes: Negative.    Respiratory: Negative.    Cardiovascular: Negative.    Gastrointestinal: Negative.    Endocrine: Negative.    Genitourinary: Negative.    Musculoskeletal: Negative.    Skin: Negative.    Allergic/Immunologic: Negative.    Neurological: Positive for speech difficulty, weakness and memory problem.   Hematological: Negative.    Psychiatric/Behavioral: Positive for decreased concentration and dysphoric mood. The patient is nervous/anxious.        Objective   Physical Exam   Constitutional: He is oriented to person, place, and time.   HENT:   Head: Normocephalic.   Right Ear: External ear normal.   Left Ear: External ear normal.   Nose: Nose normal.   Mouth/Throat: Uvula is midline, oropharynx is clear and moist and mucous membranes are normal.   Eyes: Conjunctivae, EOM and lids are normal. Pupils are equal, round, and reactive to light.   Neck: Trachea normal, normal range of motion, full passive range of motion without pain and phonation normal. Neck supple. No JVD present. Carotid bruit is not present. No thyroid mass and no thyromegaly present.   Cardiovascular: Normal rate, regular rhythm and normal heart sounds.   No murmur heard.  Pulmonary/Chest: Effort  normal and breath sounds normal.   Abdominal: Soft. Normal appearance.   Neurological: He is alert and oriented to person, place, and time. He has normal strength. He displays no atrophy and no tremor. No cranial nerve deficit or sensory deficit. He exhibits normal muscle tone. Coordination and gait normal. GCS eye subscore is 4. GCS verbal subscore is 5. GCS motor subscore is 6.   Reflex Scores:       Tricep reflexes are 2+ on the right side and 2+ on the left side.       Bicep reflexes are 2+ on the right side and 2+ on the left side.       Brachioradialis reflexes are 2+ on the right side and 2+ on the left side.       Patellar reflexes are 2+ on the right side and 2+ on the left side.       Achilles reflexes are 2+ on the right side and 2+ on the left side.  Skin: Skin is warm, dry and intact.   Psychiatric: Judgment and thought content normal. His affect is blunt. His speech is delayed and tangential. He is slowed. Cognition and memory are normal. He is attentive.         Assessment/Plan   Sangita was seen today for memory loss.    Diagnoses and all orders for this visit:    Basal ganglia infarction (CMS/HCC)    Vascular dementia without behavioral disturbance (CMS/HCC)    Cerebrovascular disease    Today's findings and recommendations are reviewed with the patient and family.  The family expresses understanding, the patient participates minimally today.  The prognosis for satisfactory treatment of his dementia is very poor.  The patient has lost a substantial amount of functional ability and he is likely to require long-term care for the remainder of his life.  Questions and concerns expressed by the family are reviewed.  Further neurologic work-up or follow-up is not recommended.      20 minutes of a 25 minute outpatient visit was spent in counseling and coordination of care today.           Dictated utilizing Dragon dictation.

## 2020-01-21 ENCOUNTER — TELEPHONE (OUTPATIENT)
Dept: INTERNAL MEDICINE | Facility: CLINIC | Age: 85
End: 2020-01-21

## 2020-01-21 RX ORDER — NITROFURANTOIN 25; 75 MG/1; MG/1
100 CAPSULE ORAL 2 TIMES DAILY
Qty: 14 CAPSULE | Refills: 0 | Status: SHIPPED | OUTPATIENT
Start: 2020-01-21 | End: 2020-01-28

## 2020-01-21 NOTE — TELEPHONE ENCOUNTER
HOME HEALTH CALLED.  PATIENT HAS DARK FOUL SMELLING URINE.  PATIENT IS CONFUSED PER VITO VENEGAS GAVE THE OK TO HAVE URINALS YS AND CULTURE AND STATED AN ANTIBIOTIC WILL BE CALLED IN

## 2020-01-23 RX ORDER — SULFAMETHOXAZOLE AND TRIMETHOPRIM 800; 160 MG/1; MG/1
1 TABLET ORAL 2 TIMES DAILY
Qty: 20 TABLET | Refills: 0 | Status: SHIPPED | OUTPATIENT
Start: 2020-01-23 | End: 2020-06-18

## 2020-01-23 NOTE — TELEPHONE ENCOUNTER
Suzanne with [facility]  called stating she recvd Urine Cultures. Positive for MRSA   Results has been faxed over.    [phone]

## 2020-01-23 NOTE — TELEPHONE ENCOUNTER
Dr. Peres reviewed urine C&S in his box, which did show MRSA, will send in Bactrim DS bid x 10 days.  Renay with [facility] informed of this.

## 2020-01-24 ENCOUNTER — TELEPHONE (OUTPATIENT)
Dept: INTERNAL MEDICINE | Facility: CLINIC | Age: 85
End: 2020-01-24

## 2020-01-24 NOTE — TELEPHONE ENCOUNTER
[facility] called for clarification.   Both meds Macrobid and Bactrum were sent over for pt. Pharm needs to know if pt should take both RX's or discontinue one.  Please ambar [phone].

## 2020-02-18 ENCOUNTER — TELEPHONE (OUTPATIENT)
Dept: INTERNAL MEDICINE | Facility: CLINIC | Age: 85
End: 2020-02-18

## 2020-02-18 NOTE — TELEPHONE ENCOUNTER
Abby with [facility] called stating pt is c/o dark urine, no odor or frequency. Caller is requesting an order for UA.     Please call [phone]

## 2020-02-21 RX ORDER — CIPROFLOXACIN 500 MG/1
500 TABLET, FILM COATED ORAL 2 TIMES DAILY
Qty: 20 TABLET | Refills: 0 | Status: SHIPPED | OUTPATIENT
Start: 2020-02-21 | End: 2020-06-18

## 2020-02-24 ENCOUNTER — TELEPHONE (OUTPATIENT)
Dept: INTERNAL MEDICINE | Facility: CLINIC | Age: 85
End: 2020-02-24

## 2020-02-24 NOTE — TELEPHONE ENCOUNTER
Abby with HH called stating pt has a baseball size red raised rash on calf.     Pt denies injury or pain. No discoloration. Skin is not hot per caller.   Abby is requesting a call back.  [phone].

## 2020-02-25 ENCOUNTER — OFFICE VISIT (OUTPATIENT)
Dept: INTERNAL MEDICINE | Facility: CLINIC | Age: 85
End: 2020-02-25

## 2020-02-25 ENCOUNTER — HOSPITAL ENCOUNTER (OUTPATIENT)
Dept: ULTRASOUND IMAGING | Facility: HOSPITAL | Age: 85
Discharge: HOME | End: 2020-02-25
Admitting: INTERNAL MEDICINE

## 2020-02-25 VITALS
WEIGHT: 222 LBS | DIASTOLIC BLOOD PRESSURE: 82 MMHG | HEART RATE: 59 BPM | BODY MASS INDEX: 30.07 KG/M2 | OXYGEN SATURATION: 98 % | SYSTOLIC BLOOD PRESSURE: 130 MMHG | HEIGHT: 72 IN

## 2020-02-25 DIAGNOSIS — I48.0 PAROXYSMAL ATRIAL FIBRILLATION (HCC): ICD-10-CM

## 2020-02-25 DIAGNOSIS — I10 ESSENTIAL HYPERTENSION: Primary | ICD-10-CM

## 2020-02-25 DIAGNOSIS — M79.89 LEG SWELLING: ICD-10-CM

## 2020-02-25 PROCEDURE — 99214 OFFICE O/P EST MOD 30 MIN: CPT | Performed by: INTERNAL MEDICINE

## 2020-02-25 PROCEDURE — 93971 EXTREMITY STUDY: CPT

## 2020-02-25 RX ORDER — BUSPIRONE HYDROCHLORIDE 15 MG/1
1 TABLET ORAL DAILY
COMMUNITY
Start: 2020-01-03 | End: 2020-03-20

## 2020-02-25 RX ORDER — FUROSEMIDE 40 MG/1
1 TABLET ORAL DAILY
COMMUNITY
Start: 2019-12-18

## 2020-02-25 RX ORDER — APIXABAN 5 MG/1
1 TABLET, FILM COATED ORAL DAILY
COMMUNITY
Start: 2020-01-31

## 2020-02-25 NOTE — PROGRESS NOTES
Subjective   Sangita Viramontes is a 87 y.o. male.   Chief Complaint   Patient presents with   • Skin Lesion     calf       Patient is here to have his right calf evaluated.  States it began to swell several days ago.  He thinks a lot of it was caused by keeping his legs dangling at the [facility] where he was a resident he is subsequently moved into assisted living is now keeping his leg better elevated       The following portions of the patient's history were reviewed and updated as appropriate: allergies, current medications, past family history, past medical history, past social history, past surgical history and problem list.    Review of Systems   Constitutional: Negative for activity change, appetite change, fatigue, fever, unexpected weight gain and unexpected weight loss.   HENT: Negative for swollen glands, trouble swallowing and voice change.    Eyes: Negative for blurred vision and visual disturbance.   Respiratory: Negative for cough and shortness of breath.    Cardiovascular: Positive for leg swelling ( Right calf). Negative for chest pain and palpitations.   Gastrointestinal: Negative for abdominal pain, constipation, diarrhea, nausea, vomiting and indigestion.   Endocrine: Negative for cold intolerance, heat intolerance, polydipsia and polyphagia.   Genitourinary: Negative for dysuria and frequency.   Musculoskeletal: Negative for arthralgias, back pain, joint swelling and neck pain.   Skin: Negative for color change, rash and skin lesions.   Neurological: Negative for dizziness, weakness, headache, memory problem and confusion.   Hematological: Does not bruise/bleed easily.   Psychiatric/Behavioral: Negative for agitation, hallucinations and suicidal ideas. The patient is not nervous/anxious.        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   • Essential (primary) hypertension    •  Hypertension    • Interstitial pulmonary disease (CMS/HCC)    • PAD (peripheral artery disease) (CMS/HCC)    • Peripheral vascular disease (CMS/HCC)    • Sleep apnea    • Thoracic aortic ectasia (CMS/HCC)    • Varicose vein of leg       Past Surgical History:   Procedure Laterality Date   • CATARACT EXTRACTION Bilateral    • CHOLECYSTECTOMY     • HIP SURGERY  02/06/2019    partial left hip replacement   • LEG SURGERY     • TOTAL KNEE ARTHROPLASTY Left    • TOTAL KNEE ARTHROPLASTY Right         Current Outpatient Medications:   •  acetaminophen (TYLENOL) 500 MG tablet, Take 500 mg by mouth Every 6 (Six) Hours As Needed for Mild Pain ., Disp: , Rfl:   •  busPIRone (BUSPAR) 10 MG tablet, Take 10 mg by mouth Daily., Disp: , Rfl:   •  busPIRone (BUSPAR) 15 MG tablet, Take 1 tablet by mouth Daily., Disp: , Rfl:   •  ciprofloxacin (CIPRO) 500 MG tablet, Take 1 tablet by mouth 2 (Two) Times a Day., Disp: 20 tablet, Rfl: 0  •  CloNIDine (CATAPRES) 0.1 MG tablet, Take 1 tablet by mouth Every 4 (Four) Hours As Needed for High Blood Pressure (sbp >160)., Disp: , Rfl:   •  ELIQUIS 5 MG tablet tablet, Take 1 tablet by mouth Daily., Disp: , Rfl:   •  furosemide (LASIX) 40 MG tablet, Take 1 tablet by mouth Daily., Disp: , Rfl:   •  irbesartan (AVAPRO) 150 MG tablet, Take 150 mg by mouth Every Night., Disp: , Rfl:   •  metoprolol tartrate (LOPRESSOR) 50 MG tablet, Take 1 tablet by mouth Every 12 (Twelve) Hours., Disp: , Rfl:   •  Multiple Vitamins-Minerals (VISION-JUVENTINO PRESERVE PO), Take 1 tablet by mouth Daily., Disp: , Rfl:   •  Omega-3 Fatty Acids (FISH OIL) 1000 MG capsule capsule, Take 1,000 mg by mouth Every Night., Disp: , Rfl:   •  sulfamethoxazole-trimethoprim (BACTRIM DS) 800-160 MG per tablet, Take 1 tablet by mouth 2 (Two) Times a Day., Disp: 20 tablet, Rfl: 0  •  vitamin B-12 (CYANOCOBALAMIN) 1000 MCG tablet, Take 1,000 mcg by mouth Daily., Disp: , Rfl:   •  vitamin E 400 UNIT capsule, Take 400 Units by mouth Daily.,  Disp: , Rfl:   •  warfarin (COUMADIN) 5 MG tablet, Take 5 mg by mouth Daily. Give 1 tablet by mouth 3 days per week on Tuesday, Thursday, and Saturday at 1700, Disp: , Rfl:      Vitals:    02/25/20 1142   BP: 130/82   Pulse: 59   SpO2: 98%         02/25/20  1142   Weight: 101 kg (222 lb)     Patient's Body mass index is 30.11 kg/m². BMI is above normal parameters. Recommendations include: nutrition counseling.      Physical Exam   Constitutional: He is oriented to person, place, and time. He appears well-developed and well-nourished.   HENT:   Head: Normocephalic and atraumatic.   Right Ear: External ear normal.   Left Ear: External ear normal.   Nose: Nose normal.   Mouth/Throat: Oropharynx is clear and moist.   Eyes: Pupils are equal, round, and reactive to light. Conjunctivae and EOM are normal.   Neck: Normal range of motion. Neck supple. No thyromegaly present.   Cardiovascular: Normal rate, regular rhythm, normal heart sounds and intact distal pulses.   Right calf is swollen, the calf looks and look unusually large as if he is torn his plantaris or but tensional he has a deep vein thrombosis   Pulmonary/Chest: Effort normal and breath sounds normal.   Abdominal: Soft. Bowel sounds are normal.   Lymphadenopathy:     He has no cervical adenopathy.   Neurological: He is alert and oriented to person, place, and time.   Skin: Skin is warm and dry.   Psychiatric: He has a normal mood and affect. His behavior is normal. Thought content normal.   Nursing note and vitals reviewed.            Assessment/Plan   Diagnoses and all orders for this visit:    1. Essential hypertension (Primary)    2. Leg swelling  -     US Venous Doppler Lower Extremity Right (duplex); Future    3. Paroxysmal atrial fibrillation (CMS/HCC)    This point we are sending patient for noninvasive venous study of right calf.  He is paroxysmal atrial fib and his hypertension seem to be adequately controlled

## 2020-03-05 ENCOUNTER — TELEPHONE (OUTPATIENT)
Dept: INTERNAL MEDICINE | Facility: CLINIC | Age: 85
End: 2020-03-05

## 2020-03-05 DIAGNOSIS — R21 RASH: Primary | ICD-10-CM

## 2020-03-05 RX ORDER — TRIAMCINOLONE ACETONIDE 1 MG/G
CREAM TOPICAL 3 TIMES DAILY PRN
Qty: 30 G | Refills: 2 | Status: SHIPPED | OUTPATIENT
Start: 2020-03-05

## 2020-03-05 NOTE — TELEPHONE ENCOUNTER
POA stopped by ofc requesting a refill for pt.     Triamcinolone 0.1% Cream   Apply AA TID PRN RASH     Please send to [facility].

## 2020-03-20 ENCOUNTER — TELEPHONE (OUTPATIENT)
Dept: INTERNAL MEDICINE | Facility: CLINIC | Age: 85
End: 2020-03-20

## 2020-03-20 RX ORDER — BUSPIRONE HYDROCHLORIDE 15 MG/1
TABLET ORAL
Qty: 45 TABLET | Refills: 2 | Status: SHIPPED | OUTPATIENT
Start: 2020-03-20

## 2020-06-08 ENCOUNTER — APPOINTMENT (OUTPATIENT)
Dept: ULTRASOUND IMAGING | Facility: HOSPITAL | Age: 85
End: 2020-06-08

## 2020-06-15 ENCOUNTER — TELEPHONE (OUTPATIENT)
Dept: INTERNAL MEDICINE | Facility: CLINIC | Age: 85
End: 2020-06-15

## 2020-06-15 NOTE — TELEPHONE ENCOUNTER
Wilmar with [facility] called stating family is requesting a UA. Pt's urine had a strong odor on Friday and Saturday.   Caller also stated pt's right calf is swollen. No redness and no heat in that area.     Please call Anel @ [facility] [phone].

## 2020-06-15 NOTE — TELEPHONE ENCOUNTER
Reached the answering service. They will page Ivonne who is on call. Per Dr. Peres it will be okay to have UA done.

## 2020-06-17 NOTE — TELEPHONE ENCOUNTER
Abby with [facility] called requesting a call back from nurse to discuss pt's calf.    Please call [phone].

## 2020-06-17 NOTE — TELEPHONE ENCOUNTER
Spoke to Abby. She mentions PT called on Monday regarding right calf. States he has a large varicose vein but not tender to touch or red. Reports that it is just red.     Also mentions that she collected UA. Results were cloudy, with sediments, odor, and he is confused. Please advise.

## 2020-06-18 ENCOUNTER — OFFICE VISIT (OUTPATIENT)
Dept: INTERNAL MEDICINE | Facility: CLINIC | Age: 85
End: 2020-06-18

## 2020-06-18 VITALS
DIASTOLIC BLOOD PRESSURE: 84 MMHG | SYSTOLIC BLOOD PRESSURE: 122 MMHG | HEART RATE: 60 BPM | BODY MASS INDEX: 30.78 KG/M2 | RESPIRATION RATE: 20 BRPM | OXYGEN SATURATION: 98 % | HEIGHT: 72 IN | WEIGHT: 227.25 LBS | TEMPERATURE: 97.3 F

## 2020-06-18 DIAGNOSIS — D51.0 PERNICIOUS ANEMIA: ICD-10-CM

## 2020-06-18 DIAGNOSIS — I87.2 VENOUS STASIS DERMATITIS OF BOTH LOWER EXTREMITIES: ICD-10-CM

## 2020-06-18 DIAGNOSIS — I10 BENIGN HYPERTENSION: Primary | ICD-10-CM

## 2020-06-18 DIAGNOSIS — I87.303 STASIS EDEMA OF BOTH LOWER EXTREMITIES: ICD-10-CM

## 2020-06-18 PROBLEM — M86.669 CHRONIC OSTEOMYELITIS OF LOWER LEG (HCC): Status: ACTIVE | Noted: 2020-06-18

## 2020-06-18 PROBLEM — I35.8 AORTIC VALVE SCLEROSIS: Status: ACTIVE | Noted: 2020-06-18

## 2020-06-18 PROBLEM — N20.0 KIDNEY STONE: Status: ACTIVE | Noted: 2020-06-18

## 2020-06-18 PROBLEM — R60.9 EDEMA: Status: ACTIVE | Noted: 2020-06-18

## 2020-06-18 PROBLEM — L40.9 PSORIASIS: Status: ACTIVE | Noted: 2020-06-18

## 2020-06-18 PROBLEM — K21.9 ESOPHAGEAL REFLUX: Status: ACTIVE | Noted: 2020-06-18

## 2020-06-18 PROBLEM — I49.5 TACHY-BRADY SYNDROME (HCC): Status: ACTIVE | Noted: 2020-06-18

## 2020-06-18 PROBLEM — R26.81 GAIT INSTABILITY: Status: ACTIVE | Noted: 2020-06-18

## 2020-06-18 PROBLEM — F41.1 GENERALIZED ANXIETY DISORDER: Status: ACTIVE | Noted: 2020-06-18

## 2020-06-18 PROCEDURE — 99213 OFFICE O/P EST LOW 20 MIN: CPT | Performed by: INTERNAL MEDICINE

## 2020-06-18 RX ORDER — METOPROLOL TARTRATE 50 MG/1
1 TABLET, FILM COATED ORAL DAILY
COMMUNITY
Start: 2020-01-03 | End: 2020-06-18 | Stop reason: SDUPTHER

## 2020-06-18 RX ORDER — DIPHENHYDRAMINE HCL 25 MG
2 CAPSULE ORAL AS NEEDED
COMMUNITY

## 2020-06-18 NOTE — PROGRESS NOTES
Subjective   Sangita Viramontes is a 87 y.o. male.   Chief Complaint   Patient presents with   • Leg Swelling     Right leg.       Patient is here for evaluation of right leg swelling.  And long discussion with him and his son apparently he sits with his feet dangling all day long he does not use a recliner which is available for him.  Is already on a diuretic.       The following portions of the patient's history were reviewed and updated as appropriate: allergies, current medications, past family history, past medical history, past social history, past surgical history and problem list.    Review of Systems   Constitutional: Negative for activity change, appetite change, fatigue, fever, unexpected weight gain and unexpected weight loss.   HENT: Negative for swollen glands, trouble swallowing and voice change.    Eyes: Negative for blurred vision and visual disturbance.   Respiratory: Negative for cough and shortness of breath.    Cardiovascular: Positive for leg swelling. Negative for chest pain and palpitations.   Gastrointestinal: Negative for abdominal pain, constipation, diarrhea, nausea, vomiting and indigestion.   Endocrine: Negative for cold intolerance, heat intolerance, polydipsia and polyphagia.   Genitourinary: Negative for dysuria and frequency.   Musculoskeletal: Positive for arthralgias and back pain. Negative for joint swelling and neck pain.   Skin: Negative for color change, rash and skin lesions.   Neurological: Negative for dizziness, weakness, headache, memory problem and confusion.   Hematological: Does not bruise/bleed easily.   Psychiatric/Behavioral: Negative for agitation, hallucinations and suicidal ideas. The patient is not nervous/anxious.        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   • Essential (primary) hypertension    • Hypertension    • Interstitial pulmonary disease  (CMS/HCC)    • PAD (peripheral artery disease) (CMS/HCC)    • Peripheral vascular disease (CMS/HCC)    • Sleep apnea    • Thoracic aortic ectasia (CMS/HCC)    • Varicose vein of leg       Past Surgical History:   Procedure Laterality Date   • CATARACT EXTRACTION Bilateral    • CHOLECYSTECTOMY     • HIP SURGERY  02/06/2019    partial left hip replacement   • LEG SURGERY     • TOTAL KNEE ARTHROPLASTY Left    • TOTAL KNEE ARTHROPLASTY Right         Current Outpatient Medications:   •  acetaminophen (TYLENOL) 500 MG tablet, Take 500 mg by mouth Every 6 (Six) Hours As Needed for Mild Pain ., Disp: , Rfl:   •  busPIRone (BUSPAR) 15 MG tablet, TAKE BY MOUTH 1/2 TABLET EVERY DAY AS NEEDED, Disp: 45 tablet, Rfl: 2  •  CloNIDine (CATAPRES) 0.1 MG tablet, Take 1 tablet by mouth Every 4 (Four) Hours As Needed for High Blood Pressure (sbp >160)., Disp: , Rfl:   •  ELIQUIS 5 MG tablet tablet, Take 1 tablet by mouth Daily., Disp: , Rfl:   •  furosemide (LASIX) 40 MG tablet, Take 1 tablet by mouth Daily., Disp: , Rfl:   •  irbesartan (AVAPRO) 150 MG tablet, Take 150 mg by mouth Every Night., Disp: , Rfl:   •  metoprolol tartrate (LOPRESSOR) 50 MG tablet, Take 1 tablet by mouth Every 12 (Twelve) Hours., Disp: , Rfl:   •  Multiple Vitamins-Minerals (VISION-JUVENTINO PRESERVE PO), Take 1 tablet by mouth Daily., Disp: , Rfl:   •  Omega-3 Fatty Acids (FISH OIL) 1000 MG capsule capsule, Take 1,000 mg by mouth Every Night., Disp: , Rfl:   •  triamcinolone (KENALOG) 0.1 % cream, Apply  topically to the appropriate area as directed 3 (Three) Times a Day As Needed for Rash., Disp: 30 g, Rfl: 2  •  vitamin B-12 (CYANOCOBALAMIN) 1000 MCG tablet, Take 1,000 mcg by mouth Daily., Disp: , Rfl:   •  vitamin E 400 UNIT capsule, Take 400 Units by mouth Daily., Disp: , Rfl:   •  diphenhydrAMINE (Benadryl Allergy) 25 mg capsule, Take 2 capsules by mouth As Needed., Disp: , Rfl:      Vitals:    06/18/20 1534   BP: 122/84   Pulse: 60   Resp: 20   Temp: 97.3  °F (36.3 °C)   SpO2: 98%         06/18/20  1534   Weight: 103 kg (227 lb 4 oz)     Patient's Body mass index is 30.82 kg/m². BMI is above normal parameters. Recommendations include: exercise counseling and nutrition counseling.      Physical Exam   Constitutional: He is oriented to person, place, and time. He appears well-developed and well-nourished.   HENT:   Head: Normocephalic and atraumatic.   Right Ear: External ear normal.   Left Ear: External ear normal.   Nose: Nose normal.   Mouth/Throat: Oropharynx is clear and moist.   Eyes: Pupils are equal, round, and reactive to light. Conjunctivae and EOM are normal.   Neck: Normal range of motion. Neck supple. No thyromegaly present.   Cardiovascular: Normal rate, regular rhythm, normal heart sounds and intact distal pulses.   Pulmonary/Chest: Effort normal and breath sounds normal.   Abdominal: Soft. Bowel sounds are normal.   Musculoskeletal: He exhibits edema ( 2+ edema right lower extremity trace to 1+ in the left).   Patient has a deformity of his right calf large varicosities throughout the right lower leg   Lymphadenopathy:     He has no cervical adenopathy.   Neurological: He is alert and oriented to person, place, and time.   Skin: Skin is warm and dry.   Psychiatric: He has a normal mood and affect. His behavior is normal. Thought content normal.   Nursing note and vitals reviewed.            Assessment/Plan   Diagnoses and all orders for this visit:    1. Benign hypertension (Primary)  -     Basic Metabolic Panel    2. Pernicious anemia  -     CBC & Differential    3. Venous stasis dermatitis of both lower extremities    4. Stasis edema of both lower extremities      Patient has significant edema both lower extremities the right being much worse than the left.  I spent considerable time asking patient to elevate his legs in a recliner instead of keeping his feet down all day long

## 2020-06-19 LAB
BASOPHILS # BLD AUTO: 0.08 10*3/MM3 (ref 0–0.2)
BASOPHILS NFR BLD AUTO: 0.9 % (ref 0–1.5)
BUN SERPL-MCNC: 17 MG/DL (ref 8–23)
BUN/CREAT SERPL: 13.2 (ref 7–25)
CALCIUM SERPL-MCNC: 9.5 MG/DL (ref 8.6–10.5)
CHLORIDE SERPL-SCNC: 105 MMOL/L (ref 98–107)
CO2 SERPL-SCNC: 24.6 MMOL/L (ref 22–29)
CREAT SERPL-MCNC: 1.29 MG/DL (ref 0.76–1.27)
EOSINOPHIL # BLD AUTO: 1.05 10*3/MM3 (ref 0–0.4)
EOSINOPHIL NFR BLD AUTO: 11.4 % (ref 0.3–6.2)
ERYTHROCYTE [DISTWIDTH] IN BLOOD BY AUTOMATED COUNT: 13.7 % (ref 12.3–15.4)
GLUCOSE SERPL-MCNC: 106 MG/DL (ref 65–99)
HCT VFR BLD AUTO: 47.4 % (ref 37.5–51)
HGB BLD-MCNC: 15.4 G/DL (ref 13–17.7)
IMM GRANULOCYTES # BLD AUTO: 0.05 10*3/MM3 (ref 0–0.05)
IMM GRANULOCYTES NFR BLD AUTO: 0.5 % (ref 0–0.5)
LYMPHOCYTES # BLD AUTO: 2.42 10*3/MM3 (ref 0.7–3.1)
LYMPHOCYTES NFR BLD AUTO: 26.3 % (ref 19.6–45.3)
MCH RBC QN AUTO: 28.7 PG (ref 26.6–33)
MCHC RBC AUTO-ENTMCNC: 32.5 G/DL (ref 31.5–35.7)
MCV RBC AUTO: 88.4 FL (ref 79–97)
MONOCYTES # BLD AUTO: 1.14 10*3/MM3 (ref 0.1–0.9)
MONOCYTES NFR BLD AUTO: 12.4 % (ref 5–12)
NEUTROPHILS # BLD AUTO: 4.46 10*3/MM3 (ref 1.7–7)
NEUTROPHILS NFR BLD AUTO: 48.5 % (ref 42.7–76)
NRBC BLD AUTO-RTO: 0 /100 WBC (ref 0–0.2)
PLATELET # BLD AUTO: 173 10*3/MM3 (ref 140–450)
POTASSIUM SERPL-SCNC: 4.2 MMOL/L (ref 3.5–5.2)
RBC # BLD AUTO: 5.36 10*6/MM3 (ref 4.14–5.8)
SODIUM SERPL-SCNC: 143 MMOL/L (ref 136–145)
WBC # BLD AUTO: 9.2 10*3/MM3 (ref 3.4–10.8)

## 2020-06-19 RX ORDER — METOPROLOL TARTRATE 50 MG/1
25 TABLET, FILM COATED ORAL 2 TIMES DAILY
Qty: 180 TABLET | Refills: 3 | Status: SHIPPED | OUTPATIENT
Start: 2020-06-19 | End: 2020-06-29

## 2020-06-23 ENCOUNTER — TELEPHONE (OUTPATIENT)
Dept: INTERNAL MEDICINE | Facility: CLINIC | Age: 85
End: 2020-06-23

## 2020-06-23 NOTE — TELEPHONE ENCOUNTER
Called Griffin, his brother, and informed of lab results and that urine culture was negative for infection.  I talked to someone identified as his son this morning, that had called in, but Griffin says his son is not involved with his care.

## 2020-08-10 ENCOUNTER — TELEPHONE (OUTPATIENT)
Dept: INTERNAL MEDICINE | Facility: CLINIC | Age: 85
End: 2020-08-10

## 2020-08-10 NOTE — TELEPHONE ENCOUNTER
Called and LM for Phill to call back, then called pts brother, who handed the phone to his wife.  She says Phill is the nurse with [facility], who saw pt today and was concerned that his mental status had declined.  She hadn't seen him in about 30 days.  Mrs. Viramontes (Griffin's wife) says pt fell about 2 weeks ago and he was sent to  ER and neck and head scan were negative for bleed or fracture.  They have not been able to see him as he has been quarantined since going to the hospital.  She says Phill suggested he have lab work, etc.  Told Mrs. Viramontes that Dr. Peres will not order anything without being able to assess him and Griffin says he can bring him.  Appt made for Wed.

## 2020-08-10 NOTE — TELEPHONE ENCOUNTER
CALLED TO ADVISE THAT PATIENTS MENTAL CONDITION HAS DECLINED. STATES THAT PATIENT ALSO HAD A FALL. IS REQUESTING A CALL BACK TO DISCUSS CONCERNS IN REGARDS TO PATIENT HEALTH CONDITION.

## 2020-08-12 ENCOUNTER — OFFICE VISIT (OUTPATIENT)
Dept: INTERNAL MEDICINE | Facility: CLINIC | Age: 85
End: 2020-08-12

## 2020-08-12 VITALS
BODY MASS INDEX: 30.75 KG/M2 | DIASTOLIC BLOOD PRESSURE: 70 MMHG | HEART RATE: 57 BPM | SYSTOLIC BLOOD PRESSURE: 134 MMHG | HEIGHT: 72 IN | OXYGEN SATURATION: 94 % | WEIGHT: 227 LBS | TEMPERATURE: 98.2 F

## 2020-08-12 DIAGNOSIS — I48.0 PAROXYSMAL ATRIAL FIBRILLATION (HCC): ICD-10-CM

## 2020-08-12 DIAGNOSIS — F01.50 VASCULAR DEMENTIA WITHOUT BEHAVIORAL DISTURBANCE (HCC): Primary | ICD-10-CM

## 2020-08-12 PROCEDURE — 99214 OFFICE O/P EST MOD 30 MIN: CPT | Performed by: INTERNAL MEDICINE

## 2020-08-12 NOTE — PROGRESS NOTES
Subjective   Sangita Viramontes is a 87 y.o. male.   Chief Complaint   Patient presents with   • Altered Mental Status     home health nurse thought he needed to be seen        Patient is present today because of the home health agency noted memory disorder.  Patient had gotten up recently and fallen on possibly some water on the floor he had a CAT scan at [facility] we have previously worked him up for a dimension felt he had vascular dementia.  I gave him 3 things to remember he cannot remember any of the 3 things he thought it was spraying it when it is actually August.  He did get the year right could not remember the president [place].       The following portions of the patient's history were reviewed and updated as appropriate: allergies, current medications, past family history, past medical history, past social history, past surgical history and problem list.    Review of Systems   Unable to perform ROS: Dementia       Objective   Past Medical History:   Diagnosis Date   • A-fib (CMS/[facility])    • Abnormal coagulation profile    • Basal ganglia infarction (CMS/[facility]) 10/3/2019   • Essential (primary) hypertension    • Hypertension    • Interstitial pulmonary disease (CMS/[facility])    • PAD (peripheral artery disease) (CMS/[facility])    • Peripheral vascular disease (CMS/[facility])    • Sleep apnea    • Thoracic aortic ectasia (CMS/[facility])    • Varicose vein of leg       Past Surgical History:   Procedure Laterality Date   • CATARACT EXTRACTION Bilateral    • CHOLECYSTECTOMY     • HIP SURGERY  02/06/2019    partial left hip replacement   • LEG SURGERY     • TOTAL KNEE ARTHROPLASTY Left    • TOTAL KNEE ARTHROPLASTY Right         Current Outpatient Medications:   •  acetaminophen (TYLENOL) 500 MG tablet, Take 500 mg by mouth Every 6 (Six) Hours As Needed for Mild Pain ., Disp: , Rfl:   •  busPIRone (BUSPAR) 15 MG tablet, TAKE BY MOUTH 1/2 TABLET EVERY DAY AS NEEDED, Disp: 45 tablet, Rfl: 2  •  CloNIDine  (CATAPRES) 0.1 MG tablet, Take 1 tablet by mouth Every 4 (Four) Hours As Needed for High Blood Pressure (sbp >160)., Disp: , Rfl:   •  diphenhydrAMINE (Benadryl Allergy) 25 mg capsule, Take 2 capsules by mouth As Needed., Disp: , Rfl:   •  ELIQUIS 5 MG tablet tablet, Take 1 tablet by mouth Daily., Disp: , Rfl:   •  furosemide (LASIX) 40 MG tablet, Take 1 tablet by mouth Daily., Disp: , Rfl:   •  irbesartan (AVAPRO) 150 MG tablet, Take 150 mg by mouth Every Night., Disp: , Rfl:   •  metoprolol tartrate (LOPRESSOR) 25 MG tablet, TAKE 1 TABLET BY MOUTH TWO TIMES A DAY, Disp: 180 tablet, Rfl: 3  •  Multiple Vitamins-Minerals (VISION-JUVENTINO PRESERVE PO), Take 1 tablet by mouth Daily., Disp: , Rfl:   •  Omega-3 Fatty Acids (FISH OIL) 1000 MG capsule capsule, Take 1,000 mg by mouth Every Night., Disp: , Rfl:   •  triamcinolone (KENALOG) 0.1 % cream, Apply  topically to the appropriate area as directed 3 (Three) Times a Day As Needed for Rash., Disp: 30 g, Rfl: 2  •  vitamin B-12 (CYANOCOBALAMIN) 1000 MCG tablet, Take 1,000 mcg by mouth Daily., Disp: , Rfl:   •  vitamin E 400 UNIT capsule, Take 400 Units by mouth Daily., Disp: , Rfl:      Vitals:    08/12/20 1341   BP: 134/70   Pulse: 57   Temp: 98.2 °F (36.8 °C)   SpO2: 94%         08/12/20  1341   Weight: 103 kg (227 lb)     Patient's Body mass index is 30.79 kg/m². BMI is .      Physical Exam   Constitutional: He appears well-developed and well-nourished.   HENT:   Head: Normocephalic and atraumatic.   Right Ear: External ear normal.   Left Ear: External ear normal.   Nose: Nose normal.   Mouth/Throat: Oropharynx is clear and moist.   Eyes: Pupils are equal, round, and reactive to light. Conjunctivae and EOM are normal.   Neck: Normal range of motion. Neck supple. No thyromegaly present.   Cardiovascular: Normal rate, regular rhythm, normal heart sounds and intact distal pulses.   Pulmonary/Chest: Effort normal and breath sounds normal.   Abdominal: Soft. Bowel sounds are  normal.   Lymphadenopathy:     He has no cervical adenopathy.   Neurological: He is alert.   Patient had difficulty remembering 3 items.  He got the time of the year wrong and he also cannot remember the president states   Skin: Skin is warm and dry.   Psychiatric: He has a normal mood and affect. His behavior is normal. Thought content normal.   Nursing note and vitals reviewed.            Assessment/Plan   Diagnoses and all orders for this visit:    1. 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 Paroxysmal atrial fibrillation (CMS/HCC)  -     TSH  -     T4, Free  -     T3, Free      This point I am going to just do lab work for him has had a recent CT of his head at [place] purchase I have asked the family to get me a copy of that I will look at that I think this represents a vascular dementia is also possible that he has sleep apnea and is desaturating at night I think he had had a machine in the past and had sent back.  We are going to check his oxygen overnight and see if he desaturates which may be contributing to his memory disorder.

## 2020-08-13 LAB
T3FREE SERPL-MCNC: 3.2 PG/ML (ref 2–4.4)
T4 FREE SERPL-MCNC: 1.15 NG/DL (ref 0.93–1.7)
TSH SERPL DL<=0.005 MIU/L-ACNC: 2.81 UIU/ML (ref 0.27–4.2)
VIT B12 SERPL-MCNC: 1215 PG/ML (ref 211–946)

## 2020-08-25 ENCOUNTER — TELEPHONE (OUTPATIENT)
Dept: INTERNAL MEDICINE | Facility: CLINIC | Age: 85
End: 2020-08-25

## 2020-08-25 RX ORDER — FLUTICASONE PROPIONATE 50 MCG
2 SPRAY, SUSPENSION (ML) NASAL DAILY
Qty: 1 BOTTLE | Refills: 1 | Status: SHIPPED | OUTPATIENT
Start: 2020-08-25

## 2020-09-10 ENCOUNTER — OFFICE VISIT (OUTPATIENT)
Dept: INTERNAL MEDICINE | Facility: CLINIC | Age: 85
End: 2020-09-10

## 2020-09-10 VITALS
SYSTOLIC BLOOD PRESSURE: 130 MMHG | HEIGHT: 72 IN | TEMPERATURE: 98 F | DIASTOLIC BLOOD PRESSURE: 70 MMHG | WEIGHT: 227 LBS | HEART RATE: 65 BPM | OXYGEN SATURATION: 96 % | BODY MASS INDEX: 30.75 KG/M2

## 2020-09-10 DIAGNOSIS — F01.50 VASCULAR DEMENTIA WITHOUT BEHAVIORAL DISTURBANCE (HCC): ICD-10-CM

## 2020-09-10 DIAGNOSIS — I10 BENIGN HYPERTENSION: Primary | ICD-10-CM

## 2020-09-10 DIAGNOSIS — Z23 NEED FOR INFLUENZA VACCINATION: ICD-10-CM

## 2020-09-10 PROCEDURE — 99214 OFFICE O/P EST MOD 30 MIN: CPT | Performed by: INTERNAL MEDICINE

## 2020-09-10 PROCEDURE — G0008 ADMIN INFLUENZA VIRUS VAC: HCPCS | Performed by: INTERNAL MEDICINE

## 2020-09-10 PROCEDURE — 90686 IIV4 VACC NO PRSV 0.5 ML IM: CPT | Performed by: INTERNAL MEDICINE

## 2020-09-10 NOTE — PROGRESS NOTES
Subjective   Sangita Viramontes is a 87 y.o. male.   Chief Complaint   Patient presents with   • Memory Loss     4 week follow up ; DOES NOT HAVE CT FROM [facility] WITH THEM        This is a follow-up for patient with dementia.  Recently we did an overnight pulse oximetry study the study indicated that he dropped his pulse ox below 89% for.  Of at least 18 minutes.  He subsequently been placed on O2 at night unfortunately we cannot verify that he is using or not using   oxygen.  I did review his B12 and thyroid function studies all of which were normal previously reviewed a outpatient CAT scan that was done at [facility].       The following portions of the patient's history were reviewed and updated as appropriate: allergies, current medications, past family history, past medical history, past social history, past surgical history and problem list.    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   • Essential (primary) hypertension    • Hypertension    • Interstitial pulmonary disease (CMS/HCC)    • PAD (peripheral artery disease) (CMS/HCC)    • Peripheral vascular disease (CMS/HCC)    • Sleep apnea    • Thoracic aortic ectasia (CMS/HCC)    • Varicose vein of leg       Past Surgical History:   Procedure Laterality Date   • CATARACT EXTRACTION Bilateral    • CHOLECYSTECTOMY     • HIP SURGERY  02/06/2019    partial left hip replacement   • LEG SURGERY     • TOTAL KNEE ARTHROPLASTY Left    • TOTAL KNEE ARTHROPLASTY Right         Current Outpatient Medications:   •  acetaminophen (TYLENOL) 500 MG tablet, Take 500 mg by mouth Every 6 (Six) Hours As Needed for Mild Pain ., Disp: , Rfl:   •  busPIRone (BUSPAR) 15 MG tablet, TAKE BY MOUTH 1/2 TABLET EVERY DAY AS NEEDED, Disp: 45 tablet, Rfl: 2  •  CloNIDine (CATAPRES) 0.1 MG  tablet, Take 1 tablet by mouth Every 4 (Four) Hours As Needed for High Blood Pressure (sbp >160)., Disp: , Rfl:   •  diphenhydrAMINE (Benadryl Allergy) 25 mg capsule, Take 2 capsules by mouth As Needed., Disp: , Rfl:   •  ELIQUIS 5 MG tablet tablet, Take 1 tablet by mouth Daily., Disp: , Rfl:   •  fluticasone (Flonase) 50 MCG/ACT nasal spray, 2 sprays into the nostril(s) as directed by provider Daily., Disp: 1 bottle, Rfl: 1  •  furosemide (LASIX) 40 MG tablet, Take 1 tablet by mouth Daily., Disp: , Rfl:   •  irbesartan (AVAPRO) 150 MG tablet, Take 150 mg by mouth Every Night., Disp: , Rfl:   •  metoprolol tartrate (LOPRESSOR) 25 MG tablet, TAKE 1 TABLET BY MOUTH TWO TIMES A DAY, Disp: 180 tablet, Rfl: 3  •  Multiple Vitamins-Minerals (VISION-JUVENTINO PRESERVE PO), Take 1 tablet by mouth Daily., Disp: , Rfl:   •  Omega-3 Fatty Acids (FISH OIL) 1000 MG capsule capsule, Take 1,000 mg by mouth Every Night., Disp: , Rfl:   •  triamcinolone (KENALOG) 0.1 % cream, Apply  topically to the appropriate area as directed 3 (Three) Times a Day As Needed for Rash., Disp: 30 g, Rfl: 2  •  vitamin B-12 (CYANOCOBALAMIN) 1000 MCG tablet, Take 1,000 mcg by mouth Daily., Disp: , Rfl:   •  vitamin E 400 UNIT capsule, Take 400 Units by mouth Daily., Disp: , Rfl:      Vitals:    09/10/20 1523   BP: 130/70   Pulse: 65   Temp: 98 °F (36.7 °C)   SpO2: 96%         09/10/20  1523   Weight: 103 kg (227 lb)     Patient's Body mass index is 30.79 kg/m². BMI is .      Physical Exam   Constitutional: He appears well-developed and well-nourished.   HENT:   Head: Normocephalic and atraumatic.   Right Ear: External ear normal.   Left Ear: External ear normal.   Nose: Nose normal.   Mouth/Throat: Oropharynx is clear and moist.   Eyes: Pupils are equal, round, and reactive to light. Conjunctivae and EOM are normal.   Neck: Normal range of motion. Neck supple. No thyromegaly present.   Cardiovascular: Normal rate, regular rhythm, normal heart sounds and  intact distal pulses.   Pulmonary/Chest: Effort normal and breath sounds normal.   Abdominal: Soft. Bowel sounds are normal.   Musculoskeletal:   Tonic deformity left lower extremity secondary to prior osteomyelitis and skin grafting.  Bilateral edema at least 1+   Lymphadenopathy:     He has no cervical adenopathy.   Neurological: He is alert.   Skin: Skin is warm and dry.   Psychiatric: He has a normal mood and affect. His behavior is normal. Thought content normal.   Nursing note and vitals reviewed.            Assessment/Plan   Diagnoses and all orders for this visit:    1. Benign hypertension (Primary)    2. Vascular dementia without behavioral disturbance (CMS/HCC)        Patient is present with his son.  Explained his need for oxygen at night unfortunately  Cannot get into the facility where he stays to verify whether or not he is using his oxygen.  Unfortunately they cannot call him either because he is not reliable on answering his phone.  I did review all of his lab work x-rays with him.

## 2020-11-13 ENCOUNTER — LAB REQUISITION (OUTPATIENT)
Dept: LAB | Facility: HOSPITAL | Age: 85
End: 2020-11-13

## 2020-11-13 DIAGNOSIS — Z00.00 ROUTINE GENERAL MEDICAL EXAMINATION AT A HEALTH CARE FACILITY: ICD-10-CM

## 2020-11-13 LAB — D DIMER PPP FEU-MCNC: 1.73 MG/L (FEU) (ref 0–0.5)

## 2020-11-13 PROCEDURE — 85379 FIBRIN DEGRADATION QUANT: CPT

## 2021-02-03 ENCOUNTER — TELEPHONE (OUTPATIENT)
Dept: VASCULAR SURGERY | Facility: CLINIC | Age: 86
End: 2021-02-03

## 2021-02-03 NOTE — TELEPHONE ENCOUNTER
Spoke with Mrs Viramontes reminding her of Mr Viramontes appointment for Thursday, February 4th, 2021 at 830 am with Dr rOr. Mrs Viramontes states that Mr Viramontes was taken to the hospital last night, Tuesday, February 3rd, 2021 to [facility]. Mrs Viramontes states her  has went down hill but once she gets him home they will call to reschedule.

## 2021-06-03 ENCOUNTER — TELEPHONE (OUTPATIENT)
Dept: INTERNAL MEDICINE CLINIC | Age: 86
End: 2021-06-03

## 2022-03-01 NOTE — THERAPY EVALUATION
Acute Care - Physical Therapy Initial Evaluation  [facility]     Patient Name: Sangita Viramontse  : 1933  MRN: [id number]  Today's Date: 2019   Onset of Illness/Injury or Date of Surgery: 19  Date of Referral to PT: 19  Referring Physician: CRYSTAL Thompson      Admit Date: 2019    Visit Dx:     ICD-10-CM ICD-9-CM   1. Altered mental status, unspecified altered mental status type R41.82 780.97   2. Subtherapeutic international normalized ratio (INR) R79.1 790.92   3. Decreased activities of daily living (ADL) R68.89 780.99   4. Impaired mobility Z74.09 799.89     Patient Active Problem List   Diagnosis   • Hypertension   • Varicose veins of both legs with edema   • Paroxysmal atrial fibrillation (CMS/HCC)   • PAD (peripheral artery disease) (CMS/HCC)   • Antiplatelet or antithrombotic long-term use   • Anticoagulant long-term use   • Venous insufficiency (chronic) (peripheral)   • Altered mental status     Past Medical History:   Diagnosis Date   • A-fib (CMS/HCC)    • Hypertension    • PAD (peripheral artery disease) (CMS/HCC)    • Sleep apnea    • Varicose vein of leg      Past Surgical History:   Procedure Laterality Date   • CATARACT EXTRACTION Bilateral    • CHOLECYSTECTOMY     • HIP SURGERY  2019    partial left hip replacement   • LEG SURGERY     • TOTAL KNEE ARTHROPLASTY Left    • TOTAL KNEE ARTHROPLASTY Right         PT ASSESSMENT (last 12 hours)      Physical Therapy Evaluation     Row Name 19 1313          PT Evaluation Time/Intention    Subjective Information  no complaints  -     Document Type  evaluation  -     Mode of Treatment  physical therapy  -     Row Name 19 1313          General Information    Patient Profile Reviewed?  yes  -     Onset of Illness/Injury or Date of Surgery  19  -     Referring Physician  CRYSTAL Thompson  -     Patient Observations  alert;cooperative;agree to therapy  -     Patient/Family Observations   dtr in law in room  -     General Observations of Patient  fowlers in bed  -     Prior Level of Function  independent:;all household mobility;community mobility;cooking;cleaning;dependent:;driving  -     Equipment Currently Used at Home  cane, straight;walker, rolling;wheelchair;shower chair;ramp  -     Pertinent History of Current Functional Problem  confusion, found down in his bedroom, Dx:  AMS  -     Existing Precautions/Restrictions  fall  -     Limitations/Impairments  safety/cognitive  -     Risks Reviewed  patient:;LOB;nausea/vomiting;dizziness;increased discomfort  -     Benefits Reviewed  patient:;improve function;increase independence;increase strength;increase balance  -     Barriers to Rehab  cognitive status  -     Row Name 06/02/19 1313          Relationship/Environment    Primary Source of Support/Comfort  extended family;child(gildardo)  -     Lives With  alone  -     Row Name 06/02/19 1313          Resource/Environmental Concerns    Current Living Arrangements  home/apartment/condo  -     Row Name 06/02/19 1313          Cognitive Assessment/Interventions    Additional Documentation  Cognitive Assessment/Intervention (Group)  -     Row Name 06/02/19 1313          Cognitive Assessment/Intervention- PT/OT    Affect/Mental Status (Cognitive)  confused  -     Orientation Status (Cognition)  oriented to;person;disoriented to;place;situation;time  -     Row Name 06/02/19 1313          Safety Issues, Functional Mobility    Safety Issues Affecting Function (Mobility)  awareness of need for assistance;insight into deficits/self awareness  -     Impairments Affecting Function (Mobility)  balance;cognition  -     Row Name 06/02/19 1313          Bed Mobility Assessment/Treatment    Supine-Sit [place] (Bed Mobility)  supervision  -     Assistive Device (Bed Mobility)  head of bed elevated;bed rails  -     Row Name 06/02/19 1313          Transfer Assessment/Treatment     Sit-Stand [place] (Transfers)  verbal cues;minimum assist (75% patient effort)  -     Stand-Sit [place] (Transfers)  verbal cues;contact guard  -     Row Name 06/02/19 1313          Gait/Stairs Assessment/Training    [place] Level (Gait)  verbal cues;contact guard  -     Assistive Device (Gait)  walker, front-wheeled  -     Distance in Feet (Gait)  40  -     Deviations/Abnormal Patterns (Gait)  gait speed decreased  -     Bilateral Gait Deviations  forward flexed posture  -[facility] Name 06/02/19 1313          General ROM    GENERAL ROM COMMENTS  WFL  -     Row Name 06/02/19 1313          MMT (Manual Muscle Testing)    General MMT Comments  functionally 4/5  -     Row Name 06/02/19 1313          Pain Assessment    Additional Documentation  Pain Scale: Numbers Pre/Post-Treatment (Group)  -[facility] Name 06/02/19 1313          Pain Scale: Numbers Pre/Post-Treatment    Pain Scale: Numbers, Pretreatment  0/10 - no pain  -     Pain Intervention(s)  Medication (See MAR)  -     Row Name             Wound 06/02/19 0000 Left clavicle abrasion    Wound - Properties Group Date first assessed: 06/02/19  -AR Time first assessed: 0000  -AR Side: Left  -AR Location: clavicle  -AR Type: abrasion  -AR    Row Name 06/02/19 1313          Plan of Care Review    Plan of Care Reviewed With  patient;family  -[facility] Name 06/02/19 1313          Physical Therapy Clinical Impression    Date of Referral to PT  06/02/19  -     PT Diagnosis (PT Clinical Impression)  impaired mobility  -     Patient/Family Goals Statement (PT Clinical Impression)  return home  -     Criteria for Skilled Interventions Met (PT Clinical Impression)  yes;treatment indicated  -     Rehab Potential (PT Clinical Summary)  good, to achieve stated therapy goals  -     Predicted Duration of Therapy (PT)  until dc  -     Care Plan Review (PT)  evaluation/treatment results reviewed;risks/benefits reviewed;patient/other  agree to care plan  -     Care Plan Review, Other Participant (PT Clinical Impression)  family  -     Row Name 06/02/19 1313          Vital Signs    Post SpO2 (%)  98  -     O2 Delivery Post Treatment  room air  -     Post Patient Position  Sitting  -     Row Name 06/02/19 1313          Physical Therapy Goals    Bed Mobility Goal Selection (PT)  bed mobility, PT goal 1  -     Transfer Goal Selection (PT)  transfer, PT goal 1  -     Gait Training Goal Selection (PT)  gait training, PT goal 1  -     Row Name 06/02/19 1313          Bed Mobility Goal 1 (PT)    Activity/Assistive Device (Bed Mobility Goal 1, PT)  bed mobility activities, all  -     [place] Level/Cues Needed (Bed Mobility Goal 1, PT)  standby assist  -     Time Frame (Bed Mobility Goal 1, PT)  by discharge  -     Progress/Outcomes (Bed Mobility Goal 1, PT)  goal ongoing  -[facility] Name 06/02/19 1313          Transfer Goal 1 (PT)    Activity/Assistive Device (Transfer Goal 1, PT)  sit-to-stand/stand-to-sit;bed-to-chair/chair-to-bed;walker, rolling  -     [place]enn[place] Level/Cues Needed (Transfer Goal 1, PT)  standby assist  -     Time Frame (Transfer Goal 1, PT)  by discharge  -     Progress/Outcome (Transfer Goal 1, PT)  goal ongoing  -[facility] Name 06/02/19 1313          Gait Training Goal 1 (PT)    Activity/Assistive Device (Gait Training Goal 1, PT)  gait (walking locomotion);assistive device use  -     [place]enn[place] Level (Gait Training Goal 1, PT)  standby assist  -     Distance (Gait Goal 1, PT)  40 feet  -     Time Frame (Gait Training Goal 1, PT)  by discharge  -     Progress/Outcome (Gait Training Goal 1, PT)  goal ongoing  -[facility] Name 06/02/19 1313          Positioning and Restraints    Pre-Treatment Position  in bed  -     Post Treatment Position  chair  -     In Chair  sitting;call light within reach;encouraged to call for assist;with family/caregiver  -[facility] Name 06/02/19 1313           Living Environment    Home Accessibility  -- walk in shower  -       User Key  (r) = Recorded By, (t) = Taken By, (c) = Cosigned By    Initials Name Provider Type     Beny Allison, PT Physical Therapist    Shonna Salazar, RN Registered Nurse        Physical Therapy Education     Title: PT OT SLP Therapies (In Progress)     Topic: Physical Therapy (Done)     Point: Mobility training (Done)     Learning Progress Summary           Patient Acceptance, E,D, DU,VU by  at 6/2/2019  1:56 PM    Comment:  benefits of PT and POC, call for assist OOB   Family Acceptance, E,D, DU,VU by  at 6/2/2019  1:56 PM    Comment:  benefits of PT and POC, call for assist OOB                   Point: Precautions (Done)     Learning Progress Summary           Patient Acceptance, E,D, DU,VU by  at 6/2/2019  1:56 PM    Comment:  benefits of PT and POC, call for assist OOB   Family Acceptance, E,D, DU,VU by  at 6/2/2019  1:56 PM    Comment:  benefits of PT and POC, call for assist OOB                               User Key     Initials Effective Dates Name Provider Type [facility] 08/02/16 -  Beny Allison, PT Physical Therapist PT              PT Recommendation and Plan  Anticipated Discharge Disposition (PT): transitional care, skilled nursing facility  Planned Therapy Interventions (PT Eval): balance training, bed mobility training, gait training, home exercise program, patient/family education, strengthening, transfer training  Therapy Frequency (PT Clinical Impression): daily(1-2x/day)  Outcome Summary/Treatment Plan (PT)  Anticipated Discharge Disposition (PT): transitional care, skilled nursing facility  Plan of Care Reviewed With: patient, family  Outcome Summary: PT IE complete.  Pt required min assist to stand bedside.  Initial standing balance is poor, does improve during ambulation w/ RW.  Pt ambulated ~ 40 feet cg assist x1 w/ RW.  PT to address functional mobility deficits.  Pt lives alone.  Recommend  transitional care vs SNF at [place].  Thank you for referral.  Outcome Measures     Row Name 06/02/19 1355 06/02/19 1300          How much help from another person do you currently need...    Turning from your back to your side while in flat bed without using bedrails?  4  -LH  --     Moving from lying on back to sitting on the side of a flat bed without bedrails?  3  -LH  --     Moving to and from a bed to a chair (including a wheelchair)?  3  -LH  --     Standing up from a chair using your arms (e.g., wheelchair, bedside chair)?  3  -LH  --     Climbing 3-5 steps with a railing?  3  -LH  --     To walk in hospital room?  3  -LH  --     AM-PAC 6 Clicks Score  19  -LH  --        How much help from another is currently needed...    Putting on and taking off regular lower body clothing?  --  2  -TR     Bathing (including washing, rinsing, and drying)  --  2  -TR     Toileting (which includes using toilet bed pan or urinal)  --  3  -TR     Putting on and taking off regular upper body clothing  --  3  -TR     Taking care of personal grooming (such as brushing teeth)  --  3  -TR     Eating meals  --  4  -TR     Score  --  17  -TR        Functional Assessment    Outcome Measure Options  AM-PAC 6 Clicks Basic Mobility (PT)  -  AM-PAC 6 Clicks Daily Activity (OT)  -       User Key  (r) = Recorded By, (t) = Taken By, (c) = Cosigned By    Initials Name Provider Type     Beny Allison, PT Physical Therapist    TR Danita Davila, OTR/L Occupational Therapist         Time Calculation:   PT Charges     Row Name 06/02/19 1359             Time Calculation    Start Time  1313  -      Stop Time  1355  -      Time Calculation (min)  42 min  -      PT Received On  06/02/19  -      PT Goal Re-Cert Due Date  06/12/19  -        User Key  (r) = Recorded By, (t) = Taken By, (c) = Cosigned By    Initials Name Provider Type     Beny Allison, PT Physical Therapist        Therapy Charges for Today     Code Description Service  Date Service Provider Modifiers Qty    90134545296 HC PT EVAL MOD COMPLEXITY 3 6/2/2019 Beny Allison, PT GP 1          PT G-Codes  Outcome Measure Options: AM-PAC 6 Clicks Basic Mobility (PT)  AM-PAC 6 Clicks Score: 19  Score: 17      Beny Allison, PT  6/2/2019        Former smoker

## (undated) DEVICE — SUTURE VCRL SZ 1 L27IN ABSRB UD L36MM CP-1 1/2 CIR REV CUT J268H

## (undated) DEVICE — DISPOSABLE FEMORAL CEMENT                                    COMPRESSOR CAP STERILE

## (undated) DEVICE — GAUZE,SPONGE,8"X4",12PLY,XRAY,STRL,LF: Brand: MEDLINE

## (undated) DEVICE — SUTURE VCRL SZ 2-0 L36IN ABSRB UD L36MM CT-1 1/2 CIR J945H

## (undated) DEVICE — DRAPE,ORTHOMAX ,HIP,W/POUCHES: Brand: MEDLINE

## (undated) DEVICE — Z INACTIVE USE 2660664 SOLUTION IRRIG 3000ML 0.9% SOD CHL USP UROMATIC PLAS CONT

## (undated) DEVICE — DUAL CUT SAGITTAL BLADE

## (undated) DEVICE — Device

## (undated) DEVICE — SURGICAL PROCEDURE PACK HIP TOT DBD CDS LOURDES HOSP LTX

## (undated) DEVICE — SOLUTION IV IRRIG POUR BRL 0.9% SODIUM CHL 2F7124

## (undated) DEVICE — Device: Brand: POWER-FLO®

## (undated) DEVICE — DRESSING FOAM SELF ADH 20X10 CM ABSORBENT MEPILEX BORDER

## (undated) DEVICE — STERILE DISPOSABLE EQUIPMENT COVER - CASSETTE COVER 20" X 40" - CUFF: Brand: PREFERRED MEDICAL PRODUCTS, LLC